# Patient Record
Sex: MALE | Race: WHITE
[De-identification: names, ages, dates, MRNs, and addresses within clinical notes are randomized per-mention and may not be internally consistent; named-entity substitution may affect disease eponyms.]

---

## 2017-06-11 ENCOUNTER — HOSPITAL ENCOUNTER (EMERGENCY)
Dept: HOSPITAL 62 - ER | Age: 82
Discharge: HOME | End: 2017-06-11
Payer: MEDICARE

## 2017-06-11 VITALS — SYSTOLIC BLOOD PRESSURE: 173 MMHG | DIASTOLIC BLOOD PRESSURE: 88 MMHG

## 2017-06-11 DIAGNOSIS — R51: ICD-10-CM

## 2017-06-11 DIAGNOSIS — S09.90XA: Primary | ICD-10-CM

## 2017-06-11 DIAGNOSIS — S00.91XA: ICD-10-CM
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
## 2017-06-11 PROCEDURE — 70450 CT HEAD/BRAIN W/O DYE: CPT

## 2017-06-11 PROCEDURE — 99284 EMERGENCY DEPT VISIT MOD MDM: CPT

## 2017-06-11 NOTE — RADIOLOGY REPORT (SQ)
EXAM DESCRIPTION:  CT HEAD WITHOUT



COMPLETED DATE/TIME:  6/11/2017 11:15 am



REASON FOR STUDY:  fall



COMPARISON:  None.



TECHNIQUE:  Axial images acquired through the brain without intravenous contrast.  Images reviewed wi
th bone, brain and subdural windows.  Images stored on PACS.

All CT scanners at this facility use dose modulation, iterative reconstruction, and/or weight based d
osing when appropriate to reduce radiation dose to as low as reasonably achievable (ALARA).

[facility]: Dose Right  [facility]: CareDose    [facility]: Dose Right    [facility]: Teradose 4D    [facility]: Smart Technologies



RADIATION DOSE:  64.61mGy.



LIMITATIONS:  None.



FINDINGS:  VENTRICLES: Prominent.

CEREBRUM: No masses.  No hemorrhage.  No midline shift.  Areas of low density in the white matter mos
t likely due to chronic micro-vascular ischemic change.  No evidence for acute infarction.

CEREBELLUM: No masses.  No hemorrhage.  No alteration of density.  No evidence for acute infarction.

EXTRAAXIAL SPACES: Age-related involutional change.  No fluid collections.  No masses.

ORBITS AND GLOBE: No intra- or extraconal masses.  Normal contour of globe without masses.

CALVARIUM: No fracture.

PARANASAL SINUSES: No fluid or mucosal thickening.

SOFT TISSUES: Large posterior scalp hematoma.

OTHER: No other significant finding.



IMPRESSION:  SCALP HEMATOMA.  NO FRACTURE OR ACUTE INTRACRANIAL PROCESS IDENTIFIED.



TECHNICAL DOCUMENTATION:  JOB ID:  [id number]

Quality ID # [id number]: Final reports with documentation of one or more dose reduction techniques (e.g., Au
tomated exposure control, adjustment of the mA and/or kV according to patient size, use of iterative 
reconstruction technique)

 2011 [employer]- All Rights Reserved

## 2017-06-11 NOTE — ER DOCUMENT REPORT
ED General





- General


Chief Complaint: Fall


Stated Complaint: FELL/HEAD PAIN


Time Seen by Provider: 06/11/17 10:59


TRAVEL OUTSIDE OF THE [place] IN LAST 30 DAYS: No





- HPI


Patient complains to provider of: fall head trauma


Notes: 


Patient coming in after he fell out of a chair.  Patient fell backwards and 

hitting his head.  Denies any loss of consciousness.  Patient denies any blood 

thinning medications.  Patient is awake alert and oriented at this time 

appropriate according to family members.  Patient does have some bleeding going 

down the back of the head.





- Related Data


Allergies/Adverse Reactions: 


 





No Known Allergies Allergy (Verified 06/11/17 11:01)


 











Past Medical History





- Social History


Smoking Status: Unknown if Ever Smoked


Family History: Reviewed & Not Pertinent





- Past Medical History


Cardiac Medical History: Reports: Hx Atrial Fibrillation, Hx Hypertension


   Denies: Hx Coronary Artery Disease, Hx Heart Attack


Pulmonary Medical History: 


   Denies: Hx Asthma, Hx Bronchitis, Hx COPD, Hx Pneumonia


Neurological Medical History: Denies: Hx Cerebrovascular Accident, Hx Seizures


Renal/ Medical History: Denies: Hx Peritoneal Dialysis


Musculoskeltal Medical History: Denies Hx Arthritis


Psychiatric Medical History: Reports: Hx Depression





- Immunizations


Hx Diphtheria, Pertussis, Tetanus Vaccination: No - unsure 





Review of Systems





- Review of Systems


Constitutional: Other - Trauma to the head


EENT: No symptoms reported


Cardiovascular: No symptoms reported


Respiratory: No symptoms reported


Gastrointestinal: No symptoms reported


Genitourinary: No symptoms reported


Male Genitourinary: No symptoms reported


Musculoskeletal: No symptoms reported


Skin: No symptoms reported


Hematologic/Lymphatic: No symptoms reported


Neurological/Psychological: No symptoms reported





Physical Exam





- Vital signs


Vitals: 


 











Temp Pulse Resp BP Pulse Ox


 


 97.2 F   67   17   173/88 H  98 


 


 06/11/17 10:58  06/11/17 10:58  06/11/17 10:58  06/11/17 10:58  06/11/17 10:58











Interpretation: Normal





- General


General appearance: Appears well, Alert





- HEENT


Head: Normocephalic.  No: Atraumatic - P hematoma to the superior occipital 

region of the head no laceration


Eyes: Normal


Pupils: PERRL





- Respiratory


Respiratory status: No respiratory distress


Chest status: Nontender


Breath sounds: Normal


Chest palpation: Normal





- Cardiovascular


Rhythm: Regular


Heart sounds: Normal auscultation


Murmur: No





- Abdominal


Inspection: Normal


Distension: No distension


Bowel sounds: Normal


Tenderness: Nontender


Organomegaly: No organomegaly





- Back


Back: Normal, Nontender





- Extremities


General upper extremity: Normal inspection, Nontender, Normal color, Normal ROM

, Normal temperature


General lower extremity: Normal inspection, Nontender, Normal color, Normal ROM

, Normal temperature, Normal weight bearing.  No: Ja's sign





- Neurological


Neuro grossly intact: Yes


Cognition: Normal


Orientation: AAOx4


Azeem Coma Scale Eye Opening: Spontaneous


[place] Coma Scale Verbal: Oriented


[place] Coma Scale Motor: Obeys Commands


[place] Coma Scale Total: 15


Speech: Normal


Motor strength normal: LUE, RUE, LLE, RLE


Sensory: Normal





- Psychological


Associated symptoms: Normal affect, Normal mood





- Skin


Skin Temperature: Warm


Skin Moisture: Dry


Skin Color: Normal





Course





- Re-evaluation


Re-evalutation: 





06/11/17 15:29


Patient coming in for head injury abrasion patient was dressed bleeding 

controlled no signs of the laceration repair.  Patient will be discharged home





- Vital Signs


Vital signs: 


 











Temp Pulse Resp BP Pulse Ox


 


 97.2 F   67   17   173/88 H  98 


 


 06/11/17 11:20  06/11/17 11:20  06/11/17 11:20  06/11/17 11:20  06/11/17 11:20














Discharge





- Discharge


Clinical Impression: 


 Abrasions





Head injury


Qualifiers:


 Encounter type: initial encounter Qualified Code(s): S09.90XA - Unspecified 

injury of head, initial encounter





Condition: Good


Disposition: HOME, SELF-CARE


Instructions:  Abrasions ([facility]), Head Injury Precautions ([facility])


Additional Instructions: 


Please follow-up with your doctor as needed.  Return to the ER for any 

concerning symptoms.  Please keep antibiotic ointment on the wound.


Referrals: 


DENICE PANDEY MD [Primary Care Provider] - Follow up as needed

## 2019-01-11 ENCOUNTER — HOSPITAL ENCOUNTER (INPATIENT)
Dept: HOSPITAL 62 - ER | Age: 84
LOS: 5 days | Discharge: NURSING, REHAB OR LONG-TERM CARE | DRG: 481 | End: 2019-01-16
Attending: INTERNAL MEDICINE | Admitting: INTERNAL MEDICINE
Payer: MEDICARE

## 2019-01-11 DIAGNOSIS — B96.4: ICD-10-CM

## 2019-01-11 DIAGNOSIS — Z79.82: ICD-10-CM

## 2019-01-11 DIAGNOSIS — F32.9: ICD-10-CM

## 2019-01-11 DIAGNOSIS — R33.9: ICD-10-CM

## 2019-01-11 DIAGNOSIS — D68.32: ICD-10-CM
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
## 2019-01-11 DIAGNOSIS — S72.142A: Primary | ICD-10-CM

## 2019-01-11 DIAGNOSIS — E87.6: ICD-10-CM

## 2019-01-11 DIAGNOSIS — I48.91: ICD-10-CM

## 2019-01-11 DIAGNOSIS — D62: ICD-10-CM

## 2019-01-11 DIAGNOSIS — E11.9: ICD-10-CM

## 2019-01-11 DIAGNOSIS — N30.00: ICD-10-CM

## 2019-01-11 DIAGNOSIS — I10: ICD-10-CM

## 2019-01-11 LAB
ADD MANUAL DIFF: NO
ALBUMIN SERPL-MCNC: 3.1 G/DL (ref 3.5–5)
ALP SERPL-CCNC: 71 U/L (ref 38–126)
ALT SERPL-CCNC: 24 U/L (ref 21–72)
AMYLASE SERPL-CCNC: < 30 U/L (ref 30–110)
ANION GAP SERPL CALC-SCNC: 7 MMOL/L (ref 5–19)
APPEARANCE UR: (no result)
APTT BLD: 40.9 SEC (ref 23.5–35.8)
APTT BLD: 42.1 SEC (ref 23.5–35.8)
APTT PPP: YELLOW S
AST SERPL-CCNC: 25 U/L (ref 17–59)
BARBITURATES UR QL SCN: NEGATIVE
BASOPHILS # BLD AUTO: 0.1 10^3/UL (ref 0–0.2)
BASOPHILS NFR BLD AUTO: 0.6 % (ref 0–2)
BILIRUB DIRECT SERPL-MCNC: 0.3 MG/DL (ref 0–0.4)
BILIRUB SERPL-MCNC: 0.5 MG/DL (ref 0.2–1.3)
BILIRUB UR QL STRIP: NEGATIVE
BUN SERPL-MCNC: 20 MG/DL (ref 7–20)
CALCIUM: 8.1 MG/DL (ref 8.4–10.2)
CHLORIDE SERPL-SCNC: 109 MMOL/L (ref 98–107)
CK MB SERPL-MCNC: 3.44 NG/ML (ref ?–4.55)
CO2 SERPL-SCNC: 26 MMOL/L (ref 22–30)
EOSINOPHIL # BLD AUTO: 0.1 10^3/UL (ref 0–0.6)
EOSINOPHIL NFR BLD AUTO: 0.8 % (ref 0–6)
ERYTHROCYTE [DISTWIDTH] IN BLOOD BY AUTOMATED COUNT: 13.5 % (ref 11.5–14)
FREE T4 (FREE THYROXINE): 1.17 NG/DL (ref 0.78–2.19)
GLUCOSE SERPL-MCNC: 150 MG/DL (ref 75–110)
GLUCOSE UR STRIP-MCNC: NEGATIVE MG/DL
HCT VFR BLD CALC: 30.2 % (ref 37.9–51)
HGB BLD-MCNC: 10.6 G/DL (ref 13.5–17)
INR PPP: 1.23
INR PPP: 1.29
KETONES UR STRIP-MCNC: NEGATIVE MG/DL
LIPASE SERPL-CCNC: < 10 U/L (ref 23–300)
LYMPHOCYTES # BLD AUTO: 1 10^3/UL (ref 0.5–4.7)
LYMPHOCYTES NFR BLD AUTO: 10.4 % (ref 13–45)
MCH RBC QN AUTO: 32.4 PG (ref 27–33.4)
MCHC RBC AUTO-ENTMCNC: 35 G/DL (ref 32–36)
MCV RBC AUTO: 93 FL (ref 80–97)
METHADONE UR QL SCN: NEGATIVE
MONOCYTES # BLD AUTO: 0.9 10^3/UL (ref 0.1–1.4)
MONOCYTES NFR BLD AUTO: 9 % (ref 3–13)
NEUTROPHILS # BLD AUTO: 8 10^3/UL (ref 1.7–8.2)
NEUTS SEG NFR BLD AUTO: 79.2 % (ref 42–78)
NITRITE UR QL STRIP: POSITIVE
PCP UR QL SCN: NEGATIVE
PH UR STRIP: 6 [PH] (ref 5–9)
PHOSPHATE SERPL-MCNC: 3.4 MG/DL (ref 2.5–4.5)
PLATELET # BLD: 138 10^3/UL (ref 150–450)
POTASSIUM SERPL-SCNC: 3.4 MMOL/L (ref 3.6–5)
PROT SERPL-MCNC: 5.6 G/DL (ref 6.3–8.2)
PROT UR STRIP-MCNC: 100 MG/DL
PROTHROMBIN TIME: 16.1 SEC (ref 11.4–15.4)
PROTHROMBIN TIME: 16.8 SEC (ref 11.4–15.4)
RBC # BLD AUTO: 3.27 10^6/UL (ref 4.35–5.55)
SODIUM SERPL-SCNC: 142.1 MMOL/L (ref 137–145)
SP GR UR STRIP: 1.02
TOTAL CELLS COUNTED % (AUTO): 100 %
TROPONIN I SERPL-MCNC: 0.02 NG/ML
TSH SERPL-ACNC: 0.68 UIU/ML (ref 0.47–4.68)
URINE AMPHETAMINES SCREEN: NEGATIVE
URINE BENZODIAZEPINES SCREEN: NEGATIVE
URINE COCAINE SCREEN: NEGATIVE
URINE MARIJUANA (THC) SCREEN: NEGATIVE
UROBILINOGEN UR-MCNC: NEGATIVE MG/DL (ref ?–2)
WBC # BLD AUTO: 10.1 10^3/UL (ref 4–10.5)

## 2019-01-11 PROCEDURE — 85027 COMPLETE CBC AUTOMATED: CPT

## 2019-01-11 PROCEDURE — 96375 TX/PRO/DX INJ NEW DRUG ADDON: CPT

## 2019-01-11 PROCEDURE — 82570 ASSAY OF URINE CREATININE: CPT

## 2019-01-11 PROCEDURE — 80061 LIPID PANEL: CPT

## 2019-01-11 PROCEDURE — 36415 COLL VENOUS BLD VENIPUNCTURE: CPT

## 2019-01-11 PROCEDURE — 85730 THROMBOPLASTIN TIME PARTIAL: CPT

## 2019-01-11 PROCEDURE — 82150 ASSAY OF AMYLASE: CPT

## 2019-01-11 PROCEDURE — 96374 THER/PROPH/DIAG INJ IV PUSH: CPT

## 2019-01-11 PROCEDURE — 84443 ASSAY THYROID STIM HORMONE: CPT

## 2019-01-11 PROCEDURE — 93010 ELECTROCARDIOGRAM REPORT: CPT

## 2019-01-11 PROCEDURE — 82140 ASSAY OF AMMONIA: CPT

## 2019-01-11 PROCEDURE — 84100 ASSAY OF PHOSPHORUS: CPT

## 2019-01-11 PROCEDURE — 83036 HEMOGLOBIN GLYCOSYLATED A1C: CPT

## 2019-01-11 PROCEDURE — 36430 TRANSFUSION BLD/BLD COMPNT: CPT

## 2019-01-11 PROCEDURE — 83880 ASSAY OF NATRIURETIC PEPTIDE: CPT

## 2019-01-11 PROCEDURE — 80076 HEPATIC FUNCTION PANEL: CPT

## 2019-01-11 PROCEDURE — 86901 BLOOD TYPING SEROLOGIC RH(D): CPT

## 2019-01-11 PROCEDURE — 85610 PROTHROMBIN TIME: CPT

## 2019-01-11 PROCEDURE — 80307 DRUG TEST PRSMV CHEM ANLYZR: CPT

## 2019-01-11 PROCEDURE — 84439 ASSAY OF FREE THYROXINE: CPT

## 2019-01-11 PROCEDURE — 86850 RBC ANTIBODY SCREEN: CPT

## 2019-01-11 PROCEDURE — C1713 ANCHOR/SCREW BN/BN,TIS/BN: HCPCS

## 2019-01-11 PROCEDURE — 99285 EMERGENCY DEPT VISIT HI MDM: CPT

## 2019-01-11 PROCEDURE — 71045 X-RAY EXAM CHEST 1 VIEW: CPT

## 2019-01-11 PROCEDURE — 82553 CREATINE MB FRACTION: CPT

## 2019-01-11 PROCEDURE — 87086 URINE CULTURE/COLONY COUNT: CPT

## 2019-01-11 PROCEDURE — 81001 URINALYSIS AUTO W/SCOPE: CPT

## 2019-01-11 PROCEDURE — 87088 URINE BACTERIA CULTURE: CPT

## 2019-01-11 PROCEDURE — 85230 CLOT FACTOR VII PROCONVERTIN: CPT

## 2019-01-11 PROCEDURE — 84156 ASSAY OF PROTEIN URINE: CPT

## 2019-01-11 PROCEDURE — 86900 BLOOD TYPING SEROLOGIC ABO: CPT

## 2019-01-11 PROCEDURE — C1769 GUIDE WIRE: HCPCS

## 2019-01-11 PROCEDURE — 85240 CLOT FACTOR VIII AHG 1 STAGE: CPT

## 2019-01-11 PROCEDURE — 84484 ASSAY OF TROPONIN QUANT: CPT

## 2019-01-11 PROCEDURE — 87186 SC STD MICRODIL/AGAR DIL: CPT

## 2019-01-11 PROCEDURE — 85220 BLOOC CLOT FACTOR V TEST: CPT

## 2019-01-11 PROCEDURE — 86920 COMPATIBILITY TEST SPIN: CPT

## 2019-01-11 PROCEDURE — P9016 RBC LEUKOCYTES REDUCED: HCPCS

## 2019-01-11 PROCEDURE — 82550 ASSAY OF CK (CPK): CPT

## 2019-01-11 PROCEDURE — 85025 COMPLETE CBC W/AUTO DIFF WBC: CPT

## 2019-01-11 PROCEDURE — 01230 ANES OPN UPPER 2/3 FEMUR NOS: CPT

## 2019-01-11 PROCEDURE — 83735 ASSAY OF MAGNESIUM: CPT

## 2019-01-11 PROCEDURE — 85384 FIBRINOGEN ACTIVITY: CPT

## 2019-01-11 PROCEDURE — 80048 BASIC METABOLIC PNL TOTAL CA: CPT

## 2019-01-11 PROCEDURE — 93005 ELECTROCARDIOGRAM TRACING: CPT

## 2019-01-11 PROCEDURE — 83690 ASSAY OF LIPASE: CPT

## 2019-01-11 RX ADMIN — SIMVASTATIN SCH MG: 10 TABLET, FILM COATED ORAL at 21:54

## 2019-01-11 RX ADMIN — DILTIAZEM HYDROCHLORIDE SCH MG: 240 CAPSULE, EXTENDED RELEASE ORAL at 20:14

## 2019-01-11 RX ADMIN — METOPROLOL SUCCINATE SCH MG: 25 TABLET, EXTENDED RELEASE ORAL at 21:54

## 2019-01-11 RX ADMIN — TAMSULOSIN HYDROCHLORIDE SCH MG: 0.4 CAPSULE ORAL at 19:27

## 2019-01-11 RX ADMIN — MULTIVITAMIN TABLET SCH TAB: TABLET at 19:27

## 2019-01-11 RX ADMIN — ENOXAPARIN SODIUM SCH MG: 40 INJECTION SUBCUTANEOUS at 19:27

## 2019-01-11 RX ADMIN — VENLAFAXINE HYDROCHLORIDE SCH MG: 75 CAPSULE, EXTENDED RELEASE ORAL at 19:26

## 2019-01-11 RX ADMIN — BUPROPION HYDROCHLORIDE SCH MG: 100 TABLET, FILM COATED ORAL at 21:54

## 2019-01-11 NOTE — EKG REPORT
SEVERITY:- ABNORMAL ECG -

SINUS RHYTHM

PROBABLE POSTERIOR INFARCT

NONSPECIFIC T ABNORMALITIES, INFERIOR LEADS

:

Confirmed by: Walter Romo MD 11-Jan-2019 17:42:32

## 2019-01-11 NOTE — RADIOLOGY REPORT (SQ)
EXAM DESCRIPTION:  HIP LEFT AP/LATERAL



COMPLETED DATE/TIME:  1/11/2019 12:24 pm



REASON FOR STUDY:  shortening



COMPARISON:  6/15/2015



NUMBER OF VIEWS:  Two views.



TECHNIQUE:  AP pelvis and additional frog-leg view of the left hip.



LIMITATIONS:  None.



FINDINGS:  MINERALIZATION: Normal.

LEFT HIP: Intertrochanteric fracture.

RIGHT HIP: No fracture or dislocation.  No worrisome bone lesions.

PUBIS AND ISCHIUM: No fracture.

PELVIS: No fracture.

SACRUM: No fracture or dislocation. No worrisome bone lesions.

LOWER LUMBAR SPINE: No fracture or dislocation. No worrisome bone lesions.  No significant disc disea
se.

SOFT TISSUES: No findings.

OTHER: No other significant finding.



IMPRESSION:  Intertrochanteric fracture of the left hip.



TECHNICAL DOCUMENTATION:  JOB ID:  [id number]

 2011 [employer]- All Rights Reserved



Reading location - IP/workstation name: [email or web address]

## 2019-01-11 NOTE — PDOC H&P
History of Present Illness


Admission Date/PCP: 


  01/11/19 14:02





  DENICE PANDEY MD





History of Present Illness: 


MARÍA WSAN is a 87 year old male,He came  to the emergency room for evalu

ation of pain of the left hip joint ,he fell off the chair  about 2 days ago 

there was no antecedent history of chest pain, no loss of consciousness, no 

shortness of breath he was not dizzy, in the emergency room he was evaluated, x-

ray of the left hip joint was obtained it demonstrated intertrochanteric 

fracture of the left hip.  Based on the revised cardiac risk index score, he has

no history of CVA, he has no history of CHF, he has no history of CAD, he has no

history of CKD with a serum creatinine of more than 2 he has no history of 

Diabetes mellitus requiring insulin the prospective procedure is intermediate 

risk for complications based on all these factors he has a very low cardioVascu

lar risk in the perioperative period , Patient can proceed to surgery.








Past Medical History


Cardiac Medical History: Reports: Hypertension


Endocrine Medical History: Reports: Diabetes Mellitus Type 2


Musculoskeltal Medical History: 


   Denies: Arthritis


Psychiatric Medical History: Reports: Depression





Social History


Smoking Status: Never Smoker


Frequency of Alcohol Use: None


Drugs: None





Family History


Family History: Reviewed & Not Pertinent


Parental Family History Reviewed: Yes


Children Family History Reviewed: Yes


Sibling(s) Family History Reviewed.: Yes





Medication/Allergy


Home Medications: 








Aspirin [Ecotrin 81 mg EC Tablet] 81 mg PO DAILY 01/11/19 


Bupropion HCl [Bupropion HCl Sr] 150 mg PO BID 01/11/19 


Diltiazem HCl [Cardizem Cd 240 mg Capsule.cr] 240 mg PO DAILY 01/11/19 


Metoprolol Succinate [Toprol Xl 25 mg Tab.sr] 25 mg PO Q12 01/11/19 


RX: Multivitamin [Daily Multiple Vitamin] 1 tab PO DAILY 01/11/19 


RX: Venlafaxine HCl ER [Effexor Xr 75 mg Cap.sr] 75 mg PO DAILY 01/11/19 


Simvastatin [Zocor 10 mg Tablet] 10 mg PO QHS 01/11/19 


Tamsulosin HCl [Flomax 0.4 mg Cap.sr] 0.4 mg PO PCSUPPER 01/11/19 








Allergies/Adverse Reactions: 


                                        





No Known Allergies Allergy (Verified 06/11/17 11:01)


   











Review of Systems


Constitutional: ABSENT: chills, fever(s), headache(s), weight gain, weight loss


Eyes: ABSENT: visual disturbances


Ears: ABSENT: hearing changes


Cardiovascular: ABSENT: chest pain, dyspnea on exertion, edema, orthropnea, 

palpitations


Respiratory: ABSENT: cough, hemoptysis


Gastrointestinal: ABSENT: abdominal pain, constipation, diarrhea, hematemesis, 

hematochezia, nausea, vomiting


Genitourinary: ABSENT: dysuria, hematuria


Musculoskeletal: PRESENT: other - Left hip joint pain.  ABSENT: joint swelling


Integumentary: ABSENT: rash, wounds


Neurological: ABSENT: abnormal gait, abnormal speech, confusion, dizziness, fo

tamiko weakness, syncope


Psychiatric: ABSENT: anxiety, depression, homidical ideation, suicidal ideation


Endocrine: ABSENT: cold intolerance, heat intolerance, menstrual abnormalities, 

polydipsia, polyuria


Hematologic/Lymphatic: ABSENT: easy bleeding, easy bruising, lymphadenopathy





Physical Exam


Vital Signs: 


                                        











Temp Pulse Resp BP Pulse Ox


 


 98.2 F   59 L  18   167/59 H  94 


 


 01/11/19 17:59  01/11/19 17:59  01/11/19 17:59  01/11/19 17:59  01/11/19 17:59








                                 Intake & Output











 01/10/19 01/11/19 01/12/19





 06:59 06:59 06:59


 


Weight   74.5 kg











General appearance: PRESENT: no acute distress, well-developed, well-nourished


Head exam: PRESENT: atraumatic, normocephalic


Eye exam: PRESENT: conjunctiva pink, EOMI, PERRLA


Ear exam: PRESENT: normal external ear exam


Mouth exam: PRESENT: moist, tongue midline


Neck exam: PRESENT: full ROM


Respiratory exam: PRESENT: clear to auscultation beatrice


Cardiovascular exam: PRESENT: RRR, +S1, +S2


Pulses: PRESENT: normal dorsalis pedis pul, +2 pedal pulses bilateral


Vascular exam: PRESENT: normal capillary refill


GI/Abdominal exam: PRESENT: normal bowel sounds, soft


Rectal exam: PRESENT: deferred


Neurological exam: PRESENT: alert, CN II-XII grossly intact


Psychiatric exam: PRESENT: appropriate affect, normal mood


Skin exam: PRESENT: dry, intact, warm





Results


Laboratory Results: 


                                        





                                 01/11/19 12:27 





                                 01/11/19 12:27 





                                        











  01/11/19 01/11/19 01/11/19





  12:27 12:27 18:42


 


WBC  10.1  


 


RBC  3.27 L  


 


Hgb  10.6 L  


 


Hct  30.2 L  


 


MCV  93  


 


MCH  32.4  


 


MCHC  35.0  


 


RDW  13.5  


 


Plt Count  138 L  


 


Seg Neutrophils %  79.2 H  


 


Lymphocytes %  10.4 L  


 


Monocytes %  9.0  


 


Eosinophils %  0.8  


 


Basophils %  0.6  


 


Absolute Neutrophils  8.0  


 


Absolute Lymphocytes  1.0  


 


Absolute Monocytes  0.9  


 


Absolute Eosinophils  0.1  


 


Absolute Basophils  0.1  


 


Sodium   142.1 


 


Potassium   3.4 L 


 


Chloride   109 H 


 


Carbon Dioxide   26 


 


Anion Gap   7 


 


BUN   20 


 


Creatinine   0.80 


 


Est GFR ( Amer)   > 60 


 


Est GFR (Non-Af Amer)   > 60 


 


Glucose   150 H 


 


Calcium   8.1 L 


 


Phosphorus    3.4


 


Magnesium    1.8


 


Total Bilirubin    0.5


 


AST    25


 


ALT    24


 


Alkaline Phosphatase    71


 


Ammonia   


 


Total Protein    5.6 L


 


Albumin    3.1 L


 


Amylase    < 30 L


 


Lipase    < 10.0 L


 


TSH   


 


Free T4   














  01/11/19 01/11/19





  18:42 18:42


 


WBC  


 


RBC  


 


Hgb  


 


Hct  


 


MCV  


 


MCH  


 


MCHC  


 


RDW  


 


Plt Count  


 


Seg Neutrophils %  


 


Lymphocytes %  


 


Monocytes %  


 


Eosinophils %  


 


Basophils %  


 


Absolute Neutrophils  


 


Absolute Lymphocytes  


 


Absolute Monocytes  


 


Absolute Eosinophils  


 


Absolute Basophils  


 


Sodium  


 


Potassium  


 


Chloride  


 


Carbon Dioxide  


 


Anion Gap  


 


BUN  


 


Creatinine  


 


Est GFR ( Amer)  


 


Est GFR (Non-Af Amer)  


 


Glucose  


 


Calcium  


 


Phosphorus  


 


Magnesium  


 


Total Bilirubin  


 


AST  


 


ALT  


 


Alkaline Phosphatase  


 


Ammonia  < 8.7 L 


 


Total Protein  


 


Albumin  


 


Amylase  


 


Lipase  


 


TSH   0.68


 


Free T4   1.17








                                        











  01/11/19 01/11/19 01/11/19





  18:42 18:42 18:42


 


Creatine Kinase   132 


 


CK-MB (CK-2)    3.44


 


Troponin I    0.020


 


NT-Pro-B Natriuret Pep  1710 H  











Impressions: 


                                        





Hip X-Ray  01/11/19 00:00


IMPRESSION:  Intertrochanteric fracture of the left hip.


 














Assessment & Plan





- Diagnosis


(1) Fracture, intertrochanteric, left femur


Qualifiers: 


   Encounter type: initial encounter   Fracture type: closed   Fracture 

alignment: displaced   Qualified Code(s): S72.142A - Displaced intertrochanteric

fracture of left femur, initial encounter for closed fracture   


Is this a current diagnosis for this admission?: Yes   


Plan: 


Orthopedic consultation obtained








(2) Preprocedural cardiovascular examination


Is this a current diagnosis for this admission?: Yes   


Plan: 


Based on the revised cardiac risk index score, patient can proceed to surgery no

further testing necessary








(3) T2DM (type 2 diabetes mellitus)


Qualifiers: 


   Diabetes mellitus long term insulin use: without long term use   Diabetes 

mellitus complication status: without complication   Qualified Code(s): E11.9 - 

Type 2 diabetes mellitus without complications   


Is this a current diagnosis for this admission?: Yes

## 2019-01-11 NOTE — ER DOCUMENT REPORT
ED Hip Pain/Injury





- General


Chief Complaint: Hip Injury


Stated Complaint: FALL/HIP PAIN


Time Seen by Provider: 01/11/19 11:32


Notes: 





87-year-old male who fell several days ago complains of left hip pain.  The 

patient missed a chair trying to sit down.  Wife states he tends to plop in the 

chair.  He hit the chair then fell off the chair.  Denies hitting his head or 

neck or have any loss of consciousness.  She complains of sharp left hip pain.  

Patient is come to the ER for evaluation.  Denies chest pain denies shortness of

breath denies abdominal pain denies nausea vomiting.  Patient did not pass out 

before or after the fall.  The pain is severe again worse with movement


TRAVEL OUTSIDE OF THE [place] IN LAST 30 DAYS: No





- Related Data


Allergies/Adverse Reactions: 


                                        





No Known Allergies Allergy (Verified 06/11/17 11:01)


   











Past Medical History





- Social History


Smoking Status: Unknown if Ever Smoked


Family History: Reviewed & Not Pertinent





- Past Medical History


Cardiac Medical History: Reports: Hx Atrial Fibrillation, Hx Hypertension


   Denies: Hx Coronary Artery Disease, Hx Heart Attack


Pulmonary Medical History: 


   Denies: Hx Asthma, Hx Bronchitis, Hx COPD, Hx Pneumonia


Neurological Medical History: Denies: Hx Cerebrovascular Accident, Hx Seizures


Renal/ Medical History: Denies: Hx Peritoneal Dialysis


Musculoskeletal Medical History: Denies Hx Arthritis


Psychiatric Medical History: Reports: Hx Depression





- Immunizations


Hx Diphtheria, Pertussis, Tetanus Vaccination: No - unsure 





Review of Systems





- Review of Systems


Constitutional: denies: Chills, Fever


Cardiovascular: denies: Chest pain, Dyspnea


Respiratory: denies: Short of breath


Musculoskeletal: Joint pain.  denies: Back pain


Neurological/Psychological: denies: Lost consciousness, Headaches


-: Yes All other systems reviewed and negative





Physical Exam





- Vital signs


Vitals: 


                                        











Temp Pulse Resp BP Pulse Ox


 


 98.9 F   65   16   161/79 H  98 


 


 01/11/19 11:35  01/11/19 11:35  01/11/19 11:35  01/11/19 11:35  01/11/19 11:35














- Notes


Notes: 





GENERAL_APPEARANCE: well_nourished, alert, cooperative, appears very 

uncomfortable


 VITALS: reviewed, see vital signs table.


 HEAD: no_swelling\tenderness on the head.


 EYES: PERRL, EOMI, conjunctiva_clear.


 NOSE: no_nasal_discharge.


 MOUTH: (-)decreased moisture.


 THROAT: no_throat_inflammation, no_airway_obstruction. no_lymphadenopathy


 NECK: supple, no_neck_tenderness, (-)thyromegaly.


 BACK: no_back_tenderness.


 CHEST_WALL: no_chest_tenderness.


 LUNGS: no_wheezing, no_rales, no_rhonchi, (-)accessory muscle use, good air 

exchange bilateral.


 HEART: normal_rate, normal_rhythm, normal_S1, normal_S2, (-)S3, (-)S4, 

no_murmur, no_rub.


 ABDOMEN: normal_BS, soft, no_abd_tenderness, (-)guarding, (-)rebound, 

no_organomegaly, no_abd_masses.


 EXTREMITIES: Left hip pain at the greater trochanter with rotation shortening 

noted PMS intact distal


 SKIN: warm, dry, good_color, no_rash.


 MENTAL_STATUS: speech_clear, oriented_X_3, normal_affect, responds_appropria

tely to questions.


 NEURO: Neg Motor or Sensory Deficits on exam, CN 2-12 intact, DTR 2+ symmetric 

x 4, No cerbellar signs

















Course





- Re-evaluation


Re-evalutation: 





01/11/19 12:07


Patient has had a delayed presentation from a fall.  He had no head or neck 

injury.  He has left hip pain has obvious rotation or shortening we will get an 

x-ray to confirm fracture.  We will draw preop labs.  Pain control.





01/11/19 13:46


The patient does have a left intertrochanteric hip fracture.  I spoke with 

orthopedics on call Dr. Escobar.  He recommends medicine to admit.





I spoke with Dr. Nagel.  He will do the medical admit for the hospital.  

Spoke with the patient.  The patient's coagulopathy studies are off.  He is not 

on any anticoagulants just on aspirin.  We will have to recheck that before any 

surgery is performed.





- Vital Signs


Vital signs: 


                                        











Temp Pulse Resp BP Pulse Ox


 


 98.9 F   65   16   157/67 H  96 


 


 01/11/19 11:35  01/11/19 11:35  01/11/19 12:27  01/11/19 12:27  01/11/19 12:27














- Laboratory


Result Diagrams: 


                                 01/11/19 12:27





                                 01/11/19 12:27


Laboratory results interpreted by me: 


                                        











  01/11/19 01/11/19 01/11/19





  12:27 12:27 12:27


 


RBC  3.27 L  


 


Hgb  10.6 L  


 


Hct  30.2 L  


 


Plt Count  138 L  


 


Seg Neutrophils %  79.2 H  


 


Lymphocytes %  10.4 L  


 


PT   16.8 H 


 


APTT   42.1 H 


 


Potassium    3.4 L


 


Chloride    109 H


 


Glucose    150 H


 


Calcium    8.1 L














Discharge





- Discharge


Clinical Impression: 


Closed left hip fracture


Qualifiers:


 Encounter type: initial encounter Qualified Code(s): S72.002A - Fracture of 

unspecified part of neck of left femur, initial encounter for closed fracture





Condition: Good


Disposition: ADMITTED AS INPATIENT


Admitting Provider: Shona


Unit Admitted: Surgical Floor


Referrals: 


DENICE NAGEL MD [Primary Care Provider] - Follow up as needed

## 2019-01-11 NOTE — RADIOLOGY REPORT (SQ)
EXAM DESCRIPTION: 



XR CHEST 1 VIEW



COMPLETED DATE/TME:  01/11/2019 00:00



CLINICAL HISTORY: 



elevated bnp 



COMPARISON: 



September 19, 2013



FINDINGS: 



Contour of the mediastinum is unchanged compared with the prior

examination. Patient is rotated. Cardiac silhouette is mildly

enlarged, unchanged compared with the prior exam. EKG leads

project over the chest. There is no focal parenchymal or pleural

disease. There is no acute osseous process visualized.



IMPRESSION: 



No evidence of acute cardiopulmonary disease.

## 2019-01-11 NOTE — PDOC CONSULTATION
History of Present Illness


Admission Date/PCP: 


  01/11/19 14:02





  DENICE PANDEY MD





Patient complains of: Left hip pain


History of Present Illness: 


MARÍA SWAN is a 87 year old male who sustained a fall on 1/8/19.  Patient was

seen by EMS and had attempted to ambulate on his lower extremity but he 

continued to have difficulty and pain.  Thus was brought by EMS to the emergency

room.  While in the emergency room x-rays demonstrated fracture.  States pain 

has been noticeable with attempted ambulation.  Denies headache dizziness or 

trauma.  Denies chest pain or shortness of breath.  Denies numbness or tingling.

 Pain 4/10.








Past Medical History


Cardiac Medical History: Reports: Atrial Fibrillation, Hypertension


   Denies: Coronary Artery Disease, Myocardial Infarction


Pulmonary Medical History: 


   Denies: Asthma, Bronchitis, Chronic Obstructive Pulmonary Disease (COPD), 

Pneumonia


Neurological Medical History: 


   Denies: Seizures


Musculoskeltal Medical History: 


   Denies: Arthritis


Psychiatric Medical History: Reports: Depression


Hematology: 


   Denies: Anemia





Social History


Smoking Status: Unknown if Ever Smoked





Family History


Family History: Reviewed & Not Pertinent


Parental Family History Reviewed: No


Children Family History Reviewed: No


Sibling(s) Family History Reviewed.: No





Medication/Allergy


Home Medications: 








Aspirin [Ecotrin 81 mg EC Tablet] 81 mg PO DAILY 01/11/19 


Bupropion HCl [Bupropion HCl Sr] 150 mg PO BID 01/11/19 


Diltiazem HCl [Cardizem Cd 240 mg Capsule.cr] 240 mg PO DAILY 01/11/19 


Metoprolol Succinate [Toprol Xl 25 mg Tab.sr] 25 mg PO Q12 01/11/19 


Multivitamin [Daily Multiple Vitamin] 1 tab PO DAILY 01/11/19 


Simvastatin [Zocor 10 mg Tablet] 10 mg PO QHS 01/11/19 


Tamsulosin HCl [Flomax 0.4 mg Cap.sr] 0.4 mg PO PCSUPPER 01/11/19 


Venlafaxine HCl ER [Effexor Xr 75 mg Cap.sr] 75 mg PO DAILY 01/11/19 








Allergies/Adverse Reactions: 


                                        





No Known Allergies Allergy (Verified 06/11/17 11:01)


   











Review of Systems


Constitutional: ABSENT: chills, fever(s), headache(s), weight gain, weight loss


Eyes: ABSENT: visual disturbances


Ears: ABSENT: hearing changes


Cardiovascular: ABSENT: chest pain, dyspnea on exertion, edema, orthropnea, 

palpitations


Respiratory: ABSENT: cough, hemoptysis


Gastrointestinal: ABSENT: abdominal pain, constipation, diarrhea, hematemesis, 

hematochezia, nausea, vomiting


Genitourinary: ABSENT: dysuria, hematuria


Integumentary: ABSENT: rash, wounds


Neurological: ABSENT: abnormal gait, abnormal speech, confusion, dizziness, 

focal weakness, syncope


Psychiatric: ABSENT: anxiety, depression, homidical ideation, suicidal ideation


Endocrine: ABSENT: cold intolerance, heat intolerance, menstrual abnormalities, 

polydipsia, polyuria


Hematologic/Lymphatic: ABSENT: easy bleeding, easy bruising, lymphadenopathy





Physical Exam


Vital Signs: 


                                        











Temp Pulse Resp BP Pulse Ox


 


 98.9 F   65   18   159/68 H  98 


 


 01/11/19 11:35  01/11/19 11:35  01/11/19 14:01  01/11/19 14:01  01/11/19 14:01








                                 Intake & Output











 01/10/19 01/11/19 01/12/19





 06:59 06:59 06:59


 


Weight   72.575 kg











General appearance: PRESENT: no acute distress, well-developed, well-nourished


Head exam: PRESENT: atraumatic, normocephalic


Eye exam: PRESENT: conjunctiva pink, EOMI, PERRLA.  ABSENT: scleral icterus


Ear exam: PRESENT: normal external ear exam


Mouth exam: PRESENT: moist, tongue midline


Neck exam: PRESENT: full ROM.  ABSENT: carotid bruit, JVD, lymphadenopathy, 

thyromegaly


Respiratory exam: PRESENT: unlabored


Cardiovascular exam: PRESENT: RRR.  ABSENT: diastolic murmur, rubs, systolic 

murmur


Pulses: PRESENT: normal dorsalis pedis pul, +2 pedal pulses bilateral


Vascular exam: PRESENT: normal capillary refill


GI/Abdominal exam: PRESENT: normal bowel sounds, soft.  ABSENT: distended, 

guarding, mass, organolmegaly, rebound, tenderness


Rectal exam: PRESENT: deferred


Musculoskeletal exam: PRESENT: other - Left hip: Positive logroll, tenderness 

along the trochanteric region.  Moderate thigh swelling without change, intact 

plantarflexion/dorsiflexion.  No sensory deficits.  No calf tenderness.


Neurological exam: PRESENT: alert, awake, oriented to person, oriented to place,

oriented to time, oriented to situation, CN II-XII grossly intact.  ABSENT: 

motor sensory deficit


Psychiatric exam: PRESENT: appropriate affect, normal mood.  ABSENT: homicidal 

ideation, suicidal ideation


Skin exam: PRESENT: dry, intact, warm.  ABSENT: cyanosis, rash





Results


Laboratory Results: 


                                        





                                 01/11/19 12:27 





                                 01/11/19 12:27 





                                        











  01/11/19 01/11/19





  12:27 12:27


 


WBC  10.1 


 


RBC  3.27 L 


 


Hgb  10.6 L 


 


Hct  30.2 L 


 


MCV  93 


 


MCH  32.4 


 


MCHC  35.0 


 


RDW  13.5 


 


Plt Count  138 L 


 


Seg Neutrophils %  79.2 H 


 


Lymphocytes %  10.4 L 


 


Monocytes %  9.0 


 


Eosinophils %  0.8 


 


Basophils %  0.6 


 


Absolute Neutrophils  8.0 


 


Absolute Lymphocytes  1.0 


 


Absolute Monocytes  0.9 


 


Absolute Eosinophils  0.1 


 


Absolute Basophils  0.1 


 


Sodium   142.1


 


Potassium   3.4 L


 


Chloride   109 H


 


Carbon Dioxide   26


 


Anion Gap   7


 


BUN   20


 


Creatinine   0.80


 


Est GFR ( Amer)   > 60


 


Est GFR (Non-Af Amer)   > 60


 


Glucose   150 H


 


Calcium   8.1 L











Impressions: 


                                        





Hip X-Ray  01/11/19 00:00


IMPRESSION:  Intertrochanteric fracture of the left hip.


 














Assessment & Plan





- Diagnosis


(1) Fracture, intertrochanteric, left femur


Qualifiers: 


   Encounter type: initial encounter   Fracture type: closed   Fracture 

alignment: displaced   Qualified Code(s): S72.142A - Displaced intertrochanteric

fracture of left femur, initial encounter for closed fracture   


Is this a current diagnosis for this admission?: Yes   


Plan: 


Patient sustained a intertrochanteric fracture.  Today we discussed findings and

treatment options including operative versus nonoperative intervention patient 

is a community ambulator and thus decision was made to proceed with operative 

treatment.  Patient and family understand he is at increased risk of anesthesia 

risks given his history of atrial fibrillation but despite this fact I feel 

operative intervention is warranted in patient's situation.  After discussing 

the options with the patient and wife joint decision was made to proceed with 

operative treatment which includes left cephalo-medullary nail intertrochanteric

fracture.  Risks discussed include anesthetic complications, excessive bleeding,

infection, injury to surrounding nerves, vessels and tendons, bruising, healing 

difficulties, scar formation, posttraumatic arthritis and any unforseen 

complication.

## 2019-01-12 LAB
ADD MANUAL DIFF: NO
ANION GAP SERPL CALC-SCNC: 5 MMOL/L (ref 5–19)
APTT BLD: 43.7 SEC (ref 23.5–35.8)
BASOPHILS # BLD AUTO: 0.1 10^3/UL (ref 0–0.2)
BASOPHILS NFR BLD AUTO: 0.7 % (ref 0–2)
BUN SERPL-MCNC: 20 MG/DL (ref 7–20)
CALCIUM: 7.6 MG/DL (ref 8.4–10.2)
CHLORIDE SERPL-SCNC: 113 MMOL/L (ref 98–107)
CHOLEST SERPL-MCNC: 113.8 MG/DL (ref 0–200)
CK MB SERPL-MCNC: 2.36 NG/ML (ref ?–4.55)
CK MB SERPL-MCNC: 2.95 NG/ML (ref ?–4.55)
CK SERPL-CCNC: 83 U/L (ref 55–170)
CO2 SERPL-SCNC: 26 MMOL/L (ref 22–30)
CREAT UR-MCNC: 170.4 MG/DL (ref 22–328)
EOSINOPHIL # BLD AUTO: 0.2 10^3/UL (ref 0–0.6)
EOSINOPHIL NFR BLD AUTO: 2.8 % (ref 0–6)
ERYTHROCYTE [DISTWIDTH] IN BLOOD BY AUTOMATED COUNT: 13.5 % (ref 11.5–14)
GLUCOSE SERPL-MCNC: 114 MG/DL (ref 75–110)
HCT VFR BLD CALC: 27.7 % (ref 37.9–51)
HGB BLD-MCNC: 9.4 G/DL (ref 13.5–17)
INR PPP: 1.21
LDLC SERPL DIRECT ASSAY-MCNC: 60 MG/DL (ref ?–100)
LYMPHOCYTES # BLD AUTO: 1.2 10^3/UL (ref 0.5–4.7)
LYMPHOCYTES NFR BLD AUTO: 15.6 % (ref 13–45)
MCH RBC QN AUTO: 31.6 PG (ref 27–33.4)
MCHC RBC AUTO-ENTMCNC: 34.1 G/DL (ref 32–36)
MCV RBC AUTO: 93 FL (ref 80–97)
MONOCYTES # BLD AUTO: 0.7 10^3/UL (ref 0.1–1.4)
MONOCYTES NFR BLD AUTO: 8.8 % (ref 3–13)
NEUTROPHILS # BLD AUTO: 5.7 10^3/UL (ref 1.7–8.2)
NEUTS SEG NFR BLD AUTO: 72.1 % (ref 42–78)
PLATELET # BLD: 144 10^3/UL (ref 150–450)
POTASSIUM SERPL-SCNC: 3.7 MMOL/L (ref 3.6–5)
PROT UR STRIP-MCNC: 104.7 MG/DL (ref ?–12)
PROTHROMBIN TIME: 16 SEC (ref 11.4–15.4)
RBC # BLD AUTO: 2.99 10^6/UL (ref 4.35–5.55)
SODIUM SERPL-SCNC: 143.7 MMOL/L (ref 137–145)
TOTAL CELLS COUNTED % (AUTO): 100 %
TRIGL SERPL-MCNC: 105 MG/DL (ref ?–150)
TROPONIN I SERPL-MCNC: 0.01 NG/ML
TROPONIN I SERPL-MCNC: < 0.012 NG/ML
UR PRO/CREAT RATIO RESULT: 0.6 MG/MG (ref 0–0.2)
VLDLC SERPL CALC-MCNC: 21 MG/DL (ref 10–31)
WBC # BLD AUTO: 7.9 10^3/UL (ref 4–10.5)

## 2019-01-12 RX ADMIN — ENOXAPARIN SODIUM SCH: 40 INJECTION SUBCUTANEOUS at 13:59

## 2019-01-12 RX ADMIN — METOPROLOL SUCCINATE SCH MG: 25 TABLET, EXTENDED RELEASE ORAL at 21:48

## 2019-01-12 RX ADMIN — CIPROFLOXACIN SCH MLS/HR: 2 INJECTION, SOLUTION INTRAVENOUS at 11:46

## 2019-01-12 RX ADMIN — CIPROFLOXACIN SCH MLS/HR: 2 INJECTION, SOLUTION INTRAVENOUS at 21:49

## 2019-01-12 RX ADMIN — BUPROPION HYDROCHLORIDE SCH MG: 100 TABLET, FILM COATED ORAL at 13:57

## 2019-01-12 RX ADMIN — TAMSULOSIN HYDROCHLORIDE SCH MG: 0.4 CAPSULE ORAL at 17:48

## 2019-01-12 RX ADMIN — BUPROPION HYDROCHLORIDE SCH MG: 100 TABLET, FILM COATED ORAL at 05:05

## 2019-01-12 RX ADMIN — BUPROPION HYDROCHLORIDE SCH MG: 100 TABLET, FILM COATED ORAL at 21:48

## 2019-01-12 RX ADMIN — SIMVASTATIN SCH MG: 10 TABLET, FILM COATED ORAL at 21:48

## 2019-01-12 RX ADMIN — MULTIVITAMIN TABLET SCH TAB: TABLET at 13:58

## 2019-01-12 RX ADMIN — METOPROLOL SUCCINATE SCH MG: 25 TABLET, EXTENDED RELEASE ORAL at 13:57

## 2019-01-12 RX ADMIN — VENLAFAXINE HYDROCHLORIDE SCH MG: 75 CAPSULE, EXTENDED RELEASE ORAL at 13:57

## 2019-01-12 RX ADMIN — DILTIAZEM HYDROCHLORIDE SCH: 240 CAPSULE, EXTENDED RELEASE ORAL at 14:01

## 2019-01-12 RX ADMIN — SODIUM CHLORIDE PRN MLS/HR: 9 INJECTION, SOLUTION INTRAVENOUS at 05:06

## 2019-01-12 NOTE — PDOC PROGRESS REPORT
Subjective


Progress Note for:: 01/12/19


Subjective:: 





Patient was admitted yesterday for the management of fracture of the left femur,

he was seen by orthopedic, scheduled for surgery tomorrow.  The urinalysis was 

grossly abnormal, the urine dipstick was positive for protein, blood, nitrites, 

leukocyte esterase, the microscopy demonstrated bacteriuria, red blood cells, 

white blood cells in the urine, patient was started on Cipro empirically for 

presumed UTI


Reason For Visit: 


CLOSED FRACTURE LEFT HIP








Physical Exam


Vital Signs: 


                                        











Temp Pulse Resp BP Pulse Ox


 


 98.3 F   48 L  22 H  116/47 L  98 


 


 01/12/19 11:44  01/12/19 11:44  01/12/19 11:44  01/12/19 11:44  01/12/19 11:44








                                 Intake & Output











 01/11/19 01/12/19 01/13/19





 06:59 06:59 06:59


 


Intake Total  160 200


 


Output Total  380 


 


Balance  -220 200


 


Weight  75.1 kg 











General appearance: PRESENT: no acute distress


Eye exam: PRESENT: PERRLA


Respiratory exam: PRESENT: clear to auscultation beatrice


Cardiovascular exam: PRESENT: +S1, +S2


GI/Abdominal exam: PRESENT: soft


Neurological exam: PRESENT: alert, CN II-XII grossly intact





Results


Laboratory Results: 


                                        





                                 01/12/19 06:20 





                                 01/12/19 06:20 





                                        











  01/11/19 01/11/19 01/11/19





  18:42 18:42 18:42


 


WBC   


 


RBC   


 


Hgb   


 


Hct   


 


MCV   


 


MCH   


 


MCHC   


 


RDW   


 


Plt Count   


 


Seg Neutrophils %   


 


Lymphocytes %   


 


Monocytes %   


 


Eosinophils %   


 


Basophils %   


 


Absolute Neutrophils   


 


Absolute Lymphocytes   


 


Absolute Monocytes   


 


Absolute Eosinophils   


 


Absolute Basophils   


 


Sodium   


 


Potassium   


 


Chloride   


 


Carbon Dioxide   


 


Anion Gap   


 


BUN   


 


Creatinine   


 


Est GFR ( Amer)   


 


Est GFR (Non-Af Amer)   


 


Glucose   


 


Calcium   


 


Phosphorus  3.4  


 


Magnesium  1.8  


 


Total Bilirubin  0.5  


 


AST  25  


 


ALT  24  


 


Alkaline Phosphatase  71  


 


Ammonia   < 8.7 L 


 


Total Protein  5.6 L  


 


Albumin  3.1 L  


 


Triglycerides   


 


Cholesterol   


 


LDL Cholesterol Direct   


 


VLDL Cholesterol   


 


HDL Cholesterol   


 


Amylase  < 30 L  


 


Lipase  < 10.0 L  


 


TSH    0.68


 


Free T4    1.17


 


Urine Color   


 


Urine Appearance   


 


Urine pH   


 


Ur Specific Gravity   


 


Urine Protein   


 


Urine Glucose (UA)   


 


Urine Ketones   


 


Urine Blood   


 


Urine Nitrite   


 


Ur Leukocyte Esterase   


 


Urine WBC (Auto)   


 


Urine RBC (Auto)   














  01/11/19 01/12/19 01/12/19





  22:58 06:20 06:20


 


WBC   7.9 


 


RBC   2.99 L 


 


Hgb   9.4 L 


 


Hct   27.7 L 


 


MCV   93 


 


MCH   31.6 


 


MCHC   34.1 


 


RDW   13.5 


 


Plt Count   144 L 


 


Seg Neutrophils %   72.1 


 


Lymphocytes %   15.6 


 


Monocytes %   8.8 


 


Eosinophils %   2.8 


 


Basophils %   0.7 


 


Absolute Neutrophils   5.7 


 


Absolute Lymphocytes   1.2 


 


Absolute Monocytes   0.7 


 


Absolute Eosinophils   0.2 


 


Absolute Basophils   0.1 


 


Sodium    143.7


 


Potassium    3.7


 


Chloride    113 H


 


Carbon Dioxide    26


 


Anion Gap    5


 


BUN    20


 


Creatinine    0.87


 


Est GFR ( Amer)    > 60


 


Est GFR (Non-Af Amer)    > 60


 


Glucose    114 H


 


Calcium    7.6 L


 


Phosphorus   


 


Magnesium   


 


Total Bilirubin   


 


AST   


 


ALT   


 


Alkaline Phosphatase   


 


Ammonia   


 


Total Protein   


 


Albumin   


 


Triglycerides    105


 


Cholesterol    113.80


 


LDL Cholesterol Direct    60


 


VLDL Cholesterol    21.0


 


HDL Cholesterol    38 L


 


Amylase   


 


Lipase   


 


TSH   


 


Free T4   


 


Urine Color  YELLOW  


 


Urine Appearance  CLOUDY  


 


Urine pH  6.0  


 


Ur Specific Gravity  1.024  


 


Urine Protein  100 H  


 


Urine Glucose (UA)  NEGATIVE  


 


Urine Ketones  NEGATIVE  


 


Urine Blood  NEGATIVE  


 


Urine Nitrite  POSITIVE H  


 


Ur Leukocyte Esterase  LARGE H  


 


Urine WBC (Auto)  >182  


 


Urine RBC (Auto)  24  








                                        











  01/11/19 01/11/19 01/11/19





  18:42 18:42 18:42


 


Creatine Kinase   132 


 


CK-MB (CK-2)    3.44


 


Troponin I    0.020


 


NT-Pro-B Natriuret Pep  1710 H  














  01/12/19 01/12/19 01/12/19





  00:48 00:48 06:20


 


Creatine Kinase  102   83


 


CK-MB (CK-2)   2.95 


 


Troponin I   0.015 


 


NT-Pro-B Natriuret Pep   














  01/12/19





  06:20


 


Creatine Kinase 


 


CK-MB (CK-2)  2.36


 


Troponin I  < 0.012


 


NT-Pro-B Natriuret Pep 











Impressions: 


                                        





Chest X-Ray  01/11/19 00:00


IMPRESSION: 


 


No evidence of acute cardiopulmonary disease.


 


 


 








Hip X-Ray  01/11/19 00:00


IMPRESSION:  Intertrochanteric fracture of the left hip.


 














Assessment & Plan





- Diagnosis


(1) Fracture, intertrochanteric, left femur


Qualifiers: 


   Encounter type: initial encounter   Fracture type: closed   Fracture 

alignment: displaced   Qualified Code(s): S72.142A - Displaced intertrochanteric

fracture of left femur, initial encounter for closed fracture   


Is this a current diagnosis for this admission?: Yes   


Plan: 


Per orthopedics








(2) Preprocedural cardiovascular examination


Is this a current diagnosis for this admission?: Yes   


Plan: 


Based on the revised cardiac risk index score, patient can proceed to surgery no

further testing necessary








(3) T2DM (type 2 diabetes mellitus)


Qualifiers: 


   Diabetes mellitus long term insulin use: without long term use   Diabetes 

mellitus complication status: without complication   Qualified Code(s): E11.9 - 

Type 2 diabetes mellitus without complications   


Is this a current diagnosis for this admission?: Yes   





(4) Urinary tract infection


Qualifiers: 


   Urinary tract infection type: acute cystitis   Hematuria presence: without 

hematuria   Qualified Code(s): N30.00 - Acute cystitis without hematuria   


Is this a current diagnosis for this admission?: Yes   


Plan: 


Start  Cipro

## 2019-01-12 NOTE — PDOC PROGRESS REPORT
Subjective


Progress Note for:: 01/12/19


Subjective:: 





Patient lying in bed comfortably.  Pain currently tolerable.  No issues 

overnight.  Denies chest pain or shortness of breath.


Reason For Visit: 


CLOSED FRACTURE LEFT HIP








Physical Exam


Vital Signs: 


                                        











Temp Pulse Resp BP Pulse Ox


 


 98.6 F   71   19   129/63 H  95 


 


 01/11/19 20:07  01/11/19 20:07  01/11/19 20:07  01/11/19 20:07  01/11/19 20:07








                                 Intake & Output











 01/11/19 01/12/19 01/13/19





 06:59 06:59 06:59


 


Intake Total  160 


 


Output Total  380 


 


Balance  -220 


 


Weight  75.1 kg 











Musculoskeletal exam: PRESENT: other - Left lower extremity: Shortened/external 

rotated positive logroll.  Intact plantar flexion/dorsiflexion.  No sensory 

deficits.  No calf tenderness.





Results


Laboratory Results: 


                                        





                                 01/12/19 06:20 





                                 01/12/19 06:20 





                                        











  01/11/19 01/11/19 01/11/19





  12:27 12:27 18:42


 


WBC  10.1  


 


RBC  3.27 L  


 


Hgb  10.6 L  


 


Hct  30.2 L  


 


MCV  93  


 


MCH  32.4  


 


MCHC  35.0  


 


RDW  13.5  


 


Plt Count  138 L  


 


Seg Neutrophils %  79.2 H  


 


Lymphocytes %  10.4 L  


 


Monocytes %  9.0  


 


Eosinophils %  0.8  


 


Basophils %  0.6  


 


Absolute Neutrophils  8.0  


 


Absolute Lymphocytes  1.0  


 


Absolute Monocytes  0.9  


 


Absolute Eosinophils  0.1  


 


Absolute Basophils  0.1  


 


Sodium   142.1 


 


Potassium   3.4 L 


 


Chloride   109 H 


 


Carbon Dioxide   26 


 


Anion Gap   7 


 


BUN   20 


 


Creatinine   0.80 


 


Est GFR ( Amer)   > 60 


 


Est GFR (Non-Af Amer)   > 60 


 


Glucose   150 H 


 


Calcium   8.1 L 


 


Phosphorus    3.4


 


Magnesium    1.8


 


Total Bilirubin    0.5


 


AST    25


 


ALT    24


 


Alkaline Phosphatase    71


 


Ammonia   


 


Total Protein    5.6 L


 


Albumin    3.1 L


 


Triglycerides   


 


Cholesterol   


 


LDL Cholesterol Direct   


 


VLDL Cholesterol   


 


HDL Cholesterol   


 


Amylase    < 30 L


 


Lipase    < 10.0 L


 


TSH   


 


Free T4   


 


Urine Color   


 


Urine Appearance   


 


Urine pH   


 


Ur Specific Gravity   


 


Urine Protein   


 


Urine Glucose (UA)   


 


Urine Ketones   


 


Urine Blood   


 


Urine Nitrite   


 


Ur Leukocyte Esterase   


 


Urine WBC (Auto)   


 


Urine RBC (Auto)   














  01/11/19 01/11/19 01/11/19





  18:42 18:42 22:58


 


WBC   


 


RBC   


 


Hgb   


 


Hct   


 


MCV   


 


MCH   


 


MCHC   


 


RDW   


 


Plt Count   


 


Seg Neutrophils %   


 


Lymphocytes %   


 


Monocytes %   


 


Eosinophils %   


 


Basophils %   


 


Absolute Neutrophils   


 


Absolute Lymphocytes   


 


Absolute Monocytes   


 


Absolute Eosinophils   


 


Absolute Basophils   


 


Sodium   


 


Potassium   


 


Chloride   


 


Carbon Dioxide   


 


Anion Gap   


 


BUN   


 


Creatinine   


 


Est GFR ( Amer)   


 


Est GFR (Non-Af Amer)   


 


Glucose   


 


Calcium   


 


Phosphorus   


 


Magnesium   


 


Total Bilirubin   


 


AST   


 


ALT   


 


Alkaline Phosphatase   


 


Ammonia  < 8.7 L  


 


Total Protein   


 


Albumin   


 


Triglycerides   


 


Cholesterol   


 


LDL Cholesterol Direct   


 


VLDL Cholesterol   


 


HDL Cholesterol   


 


Amylase   


 


Lipase   


 


TSH   0.68 


 


Free T4   1.17 


 


Urine Color    YELLOW


 


Urine Appearance    CLOUDY


 


Urine pH    6.0


 


Ur Specific Gravity    1.024


 


Urine Protein    100 H


 


Urine Glucose (UA)    NEGATIVE


 


Urine Ketones    NEGATIVE


 


Urine Blood    NEGATIVE


 


Urine Nitrite    POSITIVE H


 


Ur Leukocyte Esterase    LARGE H


 


Urine WBC (Auto)    >182


 


Urine RBC (Auto)    24














  01/12/19 01/12/19





  06:20 06:20


 


WBC  7.9 


 


RBC  2.99 L 


 


Hgb  9.4 L 


 


Hct  27.7 L 


 


MCV  93 


 


MCH  31.6 


 


MCHC  34.1 


 


RDW  13.5 


 


Plt Count  144 L 


 


Seg Neutrophils %  72.1 


 


Lymphocytes %  15.6 


 


Monocytes %  8.8 


 


Eosinophils %  2.8 


 


Basophils %  0.7 


 


Absolute Neutrophils  5.7 


 


Absolute Lymphocytes  1.2 


 


Absolute Monocytes  0.7 


 


Absolute Eosinophils  0.2 


 


Absolute Basophils  0.1 


 


Sodium   143.7


 


Potassium   3.7


 


Chloride   113 H


 


Carbon Dioxide   26


 


Anion Gap   5


 


BUN   20


 


Creatinine   0.87


 


Est GFR ( Amer)   > 60


 


Est GFR (Non-Af Amer)   > 60


 


Glucose   114 H


 


Calcium   7.6 L


 


Phosphorus  


 


Magnesium  


 


Total Bilirubin  


 


AST  


 


ALT  


 


Alkaline Phosphatase  


 


Ammonia  


 


Total Protein  


 


Albumin  


 


Triglycerides   105


 


Cholesterol   113.80


 


LDL Cholesterol Direct   60


 


VLDL Cholesterol   21.0


 


HDL Cholesterol   38 L


 


Amylase  


 


Lipase  


 


TSH  


 


Free T4  


 


Urine Color  


 


Urine Appearance  


 


Urine pH  


 


Ur Specific Gravity  


 


Urine Protein  


 


Urine Glucose (UA)  


 


Urine Ketones  


 


Urine Blood  


 


Urine Nitrite  


 


Ur Leukocyte Esterase  


 


Urine WBC (Auto)  


 


Urine RBC (Auto)  








                                        











  01/11/19 01/11/19 01/11/19





  18:42 18:42 18:42


 


Creatine Kinase   132 


 


CK-MB (CK-2)    3.44


 


Troponin I    0.020


 


NT-Pro-B Natriuret Pep  1710 H  














  01/12/19 01/12/19 01/12/19





  00:48 00:48 06:20


 


Creatine Kinase  102   83


 


CK-MB (CK-2)   2.95 


 


Troponin I   0.015 


 


NT-Pro-B Natriuret Pep   














  01/12/19





  06:20


 


Creatine Kinase 


 


CK-MB (CK-2)  2.36


 


Troponin I  < 0.012


 


NT-Pro-B Natriuret Pep 











Impressions: 


                                        





Chest X-Ray  01/11/19 00:00


IMPRESSION: 


 


No evidence of acute cardiopulmonary disease.


 


 


 








Hip X-Ray  01/11/19 00:00


IMPRESSION:  Intertrochanteric fracture of the left hip.


 














Assessment & Plan





- Diagnosis


(1) Fracture, intertrochanteric, left femur


Qualifiers: 


   Encounter type: initial encounter   Fracture type: closed   Fracture 

alignment: displaced   Qualified Code(s): S72.142A - Displaced intertrochanteric

fracture of left femur, initial encounter for closed fracture   


Is this a current diagnosis for this admission?: Yes   


Plan: 


Patient sustained a intertrochanteric fracture.  Today we discussed findings and

treatment options including operative versus nonoperative intervention patient 

is a community ambulator and thus decision was made to proceed with operative 

treatment.  Patient and family understand he is at increased risk of anesthesia 

risks given his history of atrial fibrillation but despite this fact I feel 

operative intervention is warranted in patient's situation.  After discussing 

the options with the patient and wife joint decision was made to proceed with 

operative treatment which includes left cephalo-medullary nail intertrochanteric

fracture.  Risks discussed include anesthetic complications, excessive bleeding,

infection, injury to surrounding nerves, vessels and tendons, bruising, healing 

difficulties, scar formation, posttraumatic arthritis and any unforseen 

complication.  Patient has been medically optimized for operative intervention 

we will proceed on 1/13/19

## 2019-01-13 LAB
ADD MANUAL DIFF: NO
ALBUMIN SERPL-MCNC: 2.7 G/DL (ref 3.5–5)
ALP SERPL-CCNC: 60 U/L (ref 38–126)
ALT SERPL-CCNC: 26 U/L (ref 21–72)
ANION GAP SERPL CALC-SCNC: 5 MMOL/L (ref 5–19)
APTT BLD: 41.4 SEC (ref 23.5–35.8)
AST SERPL-CCNC: 23 U/L (ref 17–59)
BASOPHILS # BLD AUTO: 0 10^3/UL (ref 0–0.2)
BASOPHILS NFR BLD AUTO: 0.5 % (ref 0–2)
BILIRUB DIRECT SERPL-MCNC: 0.2 MG/DL (ref 0–0.4)
BILIRUB SERPL-MCNC: 0.6 MG/DL (ref 0.2–1.3)
BUN SERPL-MCNC: 22 MG/DL (ref 7–20)
CALCIUM: 7.5 MG/DL (ref 8.4–10.2)
CHLORIDE SERPL-SCNC: 113 MMOL/L (ref 98–107)
CO2 SERPL-SCNC: 26 MMOL/L (ref 22–30)
EOSINOPHIL # BLD AUTO: 0.2 10^3/UL (ref 0–0.6)
EOSINOPHIL NFR BLD AUTO: 2.7 % (ref 0–6)
ERYTHROCYTE [DISTWIDTH] IN BLOOD BY AUTOMATED COUNT: 13.2 % (ref 11.5–14)
GLUCOSE SERPL-MCNC: 107 MG/DL (ref 75–110)
HCT VFR BLD CALC: 25.2 % (ref 37.9–51)
HGB BLD-MCNC: 8.7 G/DL (ref 13.5–17)
INR PPP: 1.18
LYMPHOCYTES # BLD AUTO: 1.2 10^3/UL (ref 0.5–4.7)
LYMPHOCYTES NFR BLD AUTO: 15.3 % (ref 13–45)
MCH RBC QN AUTO: 31.9 PG (ref 27–33.4)
MCHC RBC AUTO-ENTMCNC: 34.4 G/DL (ref 32–36)
MCV RBC AUTO: 93 FL (ref 80–97)
MONOCYTES # BLD AUTO: 0.7 10^3/UL (ref 0.1–1.4)
MONOCYTES NFR BLD AUTO: 9.6 % (ref 3–13)
NEUTROPHILS # BLD AUTO: 5.5 10^3/UL (ref 1.7–8.2)
NEUTS SEG NFR BLD AUTO: 71.9 % (ref 42–78)
PLATELET # BLD: 150 10^3/UL (ref 150–450)
POTASSIUM SERPL-SCNC: 3.4 MMOL/L (ref 3.6–5)
PROT SERPL-MCNC: 5 G/DL (ref 6.3–8.2)
PROTHROMBIN TIME: 15.6 SEC (ref 11.4–15.4)
RBC # BLD AUTO: 2.71 10^6/UL (ref 4.35–5.55)
SODIUM SERPL-SCNC: 143.6 MMOL/L (ref 137–145)
TOTAL CELLS COUNTED % (AUTO): 100 %
WBC # BLD AUTO: 7.6 10^3/UL (ref 4–10.5)

## 2019-01-13 RX ADMIN — VENLAFAXINE HYDROCHLORIDE SCH MG: 75 CAPSULE, EXTENDED RELEASE ORAL at 09:12

## 2019-01-13 RX ADMIN — MULTIVITAMIN TABLET SCH TAB: TABLET at 09:18

## 2019-01-13 RX ADMIN — BUPROPION HYDROCHLORIDE SCH MG: 100 TABLET, FILM COATED ORAL at 13:31

## 2019-01-13 RX ADMIN — DILTIAZEM HYDROCHLORIDE SCH MG: 240 CAPSULE, EXTENDED RELEASE ORAL at 09:18

## 2019-01-13 RX ADMIN — TAMSULOSIN HYDROCHLORIDE SCH MG: 0.4 CAPSULE ORAL at 17:40

## 2019-01-13 RX ADMIN — METOPROLOL SUCCINATE SCH MG: 25 TABLET, EXTENDED RELEASE ORAL at 21:23

## 2019-01-13 RX ADMIN — CIPROFLOXACIN SCH MLS/HR: 2 INJECTION, SOLUTION INTRAVENOUS at 09:12

## 2019-01-13 RX ADMIN — BUPROPION HYDROCHLORIDE SCH MG: 100 TABLET, FILM COATED ORAL at 21:23

## 2019-01-13 RX ADMIN — CIPROFLOXACIN SCH MLS/HR: 2 INJECTION, SOLUTION INTRAVENOUS at 21:24

## 2019-01-13 RX ADMIN — METOPROLOL SUCCINATE SCH MG: 25 TABLET, EXTENDED RELEASE ORAL at 09:12

## 2019-01-13 RX ADMIN — SODIUM CHLORIDE PRN MLS/HR: 9 INJECTION, SOLUTION INTRAVENOUS at 06:02

## 2019-01-13 RX ADMIN — SIMVASTATIN SCH MG: 10 TABLET, FILM COATED ORAL at 21:23

## 2019-01-13 RX ADMIN — ENOXAPARIN SODIUM SCH: 40 INJECTION SUBCUTANEOUS at 09:19

## 2019-01-13 RX ADMIN — BUPROPION HYDROCHLORIDE SCH MG: 100 TABLET, FILM COATED ORAL at 06:01

## 2019-01-13 NOTE — PDOC PROGRESS REPORT
Subjective


Progress Note for:: 01/13/19


Subjective:: 





Patient was supposed to have surgery today but anesthesiologist was concerned 

because of prolongation of PT/PTT because he intended to do spinal anesthesia 

and he felt that he might bleed.  The prolongation of PT PTT is most likely from

Lovenox, she is on Lovenox for VTE prophylaxis, Lovenox to be discontinued.  He 

has no history of liver disease that would make me suspect coagulopathy but I 

will request for the analysis of factor VIII, factor VII, factor V


Reason For Visit: 


CLOSED FRACTURE LEFT HIP








Physical Exam


Vital Signs: 


                                        











Temp Pulse Resp BP Pulse Ox


 


 98.5 F   69   20   133/57 H  91 L


 


 01/13/19 11:36  01/13/19 11:36  01/13/19 11:36  01/13/19 11:36  01/13/19 11:36








                                 Intake & Output











 01/12/19 01/13/19 01/14/19





 06:59 06:59 06:59


 


Intake Total 160 2116 200


 


Output Total 380 600 


 


Balance -220 1516 200


 


Weight 75.1 kg 77.9 kg 











General appearance: PRESENT: no acute distress


Eye exam: PRESENT: PERRLA


Respiratory exam: PRESENT: clear to auscultation beatrice


Cardiovascular exam: PRESENT: +S1, +S2


Neurological exam: PRESENT: alert





Results


Laboratory Results: 


                                        





                                 01/13/19 04:39 





                                 01/13/19 04:39 





                                        











  01/13/19 01/13/19 01/13/19





  04:39 04:39 08:33


 


WBC  7.6  


 


RBC  2.71 L  


 


Hgb  8.7 L  


 


Hct  25.2 L  


 


MCV  93  


 


MCH  31.9  


 


MCHC  34.4  


 


RDW  13.2  


 


Plt Count  150  


 


Seg Neutrophils %  71.9  


 


Lymphocytes %  15.3  


 


Monocytes %  9.6  


 


Eosinophils %  2.7  


 


Basophils %  0.5  


 


Absolute Neutrophils  5.5  


 


Absolute Lymphocytes  1.2  


 


Absolute Monocytes  0.7  


 


Absolute Eosinophils  0.2  


 


Absolute Basophils  0.0  


 


Sodium   143.6 


 


Potassium   3.4 L 


 


Chloride   113 H 


 


Carbon Dioxide   26 


 


Anion Gap   5 


 


BUN   22 H 


 


Creatinine   0.96 


 


Est GFR ( Amer)   > 60 


 


Est GFR (Non-Af Amer)   > 60 


 


Glucose   107 


 


Calcium   7.5 L 


 


Blood Type    O POSITIVE


 


Antibody Screen    NEGATIVE








                                        











  01/11/19 01/11/19 01/11/19





  18:42 18:42 18:42


 


Creatine Kinase   132 


 


CK-MB (CK-2)    3.44


 


Troponin I    0.020


 


NT-Pro-B Natriuret Pep  1710 H  














  01/12/19 01/12/19 01/12/19





  00:48 00:48 06:20


 


Creatine Kinase  102   83


 


CK-MB (CK-2)   2.95 


 


Troponin I   0.015 


 


NT-Pro-B Natriuret Pep   














  01/12/19





  06:20


 


Creatine Kinase 


 


CK-MB (CK-2)  2.36


 


Troponin I  < 0.012


 


NT-Pro-B Natriuret Pep 











Impressions: 


                                        





Chest X-Ray  01/11/19 00:00


IMPRESSION: 


 


No evidence of acute cardiopulmonary disease.


 


 


 








Hip X-Ray  01/11/19 00:00


IMPRESSION:  Intertrochanteric fracture of the left hip.


 














Assessment & Plan





- Diagnosis


(1) Fracture, intertrochanteric, left femur


Qualifiers: 


   Encounter type: initial encounter   Fracture type: closed   Fracture 

alignment: displaced   Qualified Code(s): S72.142A - Displaced intertrochanteric

fracture of left femur, initial encounter for closed fracture   


Is this a current diagnosis for this admission?: Yes   





(2) Preprocedural cardiovascular examination


Is this a current diagnosis for this admission?: Yes   





(3) T2DM (type 2 diabetes mellitus)


Qualifiers: 


   Diabetes mellitus long term insulin use: without long term use   Diabetes 

mellitus complication status: without complication   Qualified Code(s): E11.9 - 

Type 2 diabetes mellitus without complications   


Is this a current diagnosis for this admission?: Yes   





(4) Urinary tract infection


Qualifiers: 


   Urinary tract infection type: acute cystitis   Hematuria presence: without 

hematuria   Qualified Code(s): N30.00 - Acute cystitis without hematuria   


Is this a current diagnosis for this admission?: Yes   


Plan: 


Urine culture is growing gram-negative rods patient already on IV antibiotic 

emprically








(5) Medication induced coagulopathy


Is this a current diagnosis for this admission?: Yes   


Plan: 


Coagulopathy is most likely from Lovenox, he has no history of liver disease for

me to suspect deficiency of coagulation factors.  Factor V factor VII factor 

VIII analysis is requested

## 2019-01-13 NOTE — PDOC PROGRESS REPORT
Subjective


Progress Note for:: 01/13/19


Subjective:: 





Patient lying in bed comfortably.  Pain currently tolerable.  No issues 

overnight.  Denies chest pain or shortness of breath.


Reason For Visit: 


CLOSED FRACTURE LEFT HIP








Physical Exam


Vital Signs: 


                                        











Temp Pulse Resp BP Pulse Ox


 


 98.9 F   65   18   160/64 H  94 


 


 01/13/19 07:55  01/13/19 07:55  01/13/19 07:55  01/13/19 07:55  01/13/19 07:55








                                 Intake & Output











 01/12/19 01/13/19 01/14/19





 06:59 06:59 06:59


 


Intake Total 160 2116 


 


Output Total 380 600 


 


Balance -220 1516 


 


Weight 75.1 kg 77.9 kg 











Musculoskeletal exam: PRESENT: other - Left lower extremity: Short/external 

rotated.  Pain with logroll.  Intact plantar flexion/dorsiflexion.  No calf 

tenderness.  No sensory deficits.





Results


Laboratory Results: 


                                        





                                 01/13/19 04:39 





                                 01/13/19 04:39 





                                        











  01/13/19 01/13/19 01/13/19





  04:39 04:39 08:33


 


WBC  7.6  


 


RBC  2.71 L  


 


Hgb  8.7 L  


 


Hct  25.2 L  


 


MCV  93  


 


MCH  31.9  


 


MCHC  34.4  


 


RDW  13.2  


 


Plt Count  150  


 


Seg Neutrophils %  71.9  


 


Lymphocytes %  15.3  


 


Monocytes %  9.6  


 


Eosinophils %  2.7  


 


Basophils %  0.5  


 


Absolute Neutrophils  5.5  


 


Absolute Lymphocytes  1.2  


 


Absolute Monocytes  0.7  


 


Absolute Eosinophils  0.2  


 


Absolute Basophils  0.0  


 


Sodium   143.6 


 


Potassium   3.4 L 


 


Chloride   113 H 


 


Carbon Dioxide   26 


 


Anion Gap   5 


 


BUN   22 H 


 


Creatinine   0.96 


 


Est GFR ( Amer)   > 60 


 


Est GFR (Non-Af Amer)   > 60 


 


Glucose   107 


 


Calcium   7.5 L 


 


Blood Type    O POSITIVE


 


Antibody Screen    NEGATIVE








                                        











  01/11/19 01/11/19 01/11/19





  18:42 18:42 18:42


 


Creatine Kinase   132 


 


CK-MB (CK-2)    3.44


 


Troponin I    0.020


 


NT-Pro-B Natriuret Pep  1710 H  














  01/12/19 01/12/19 01/12/19





  00:48 00:48 06:20


 


Creatine Kinase  102   83


 


CK-MB (CK-2)   2.95 


 


Troponin I   0.015 


 


NT-Pro-B Natriuret Pep   














  01/12/19





  06:20


 


Creatine Kinase 


 


CK-MB (CK-2)  2.36


 


Troponin I  < 0.012


 


NT-Pro-B Natriuret Pep 











Impressions: 


                                        





Chest X-Ray  01/11/19 00:00


IMPRESSION: 


 


No evidence of acute cardiopulmonary disease.


 


 


 








Hip X-Ray  01/11/19 00:00


IMPRESSION:  Intertrochanteric fracture of the left hip.


 














Assessment & Plan





- Diagnosis


(1) Fracture, intertrochanteric, left femur


Qualifiers: 


   Encounter type: initial encounter   Fracture type: closed   Fracture 

alignment: displaced   Qualified Code(s): S72.142A - Displaced intertrochanteric

fracture of left femur, initial encounter for closed fracture   


Is this a current diagnosis for this admission?: Yes   


Plan: 


Patient sustained a intertrochanteric fracture.  Today we discussed findings and

treatment options including operative versus nonoperative intervention patient 

is a community ambulator and thus decision was made to proceed with operative 

treatment.  Patient and family understand he is at increased risk of anesthesia 

risks given his history of atrial fibrillation but despite this fact I feel 

operative intervention is warranted in patient's situation.  After discussing 

the options with the patient and wife joint decision was made to proceed with 

operative treatment which includes left cephalo-medullary nail intertrochanteric

fracture.  Risks discussed include anesthetic complications, excessive bleeding,

infection, injury to surrounding nerves, vessels and tendons, bruising, healing 

difficulties, scar formation, posttraumatic arthritis and any unforseen 

complication.  Patient surgery has been postponed given anesthesia request that 

they obtain a spinal as opposed to general anesthesia will await PT/PTT/INR at 

the appropriate level for anesthesia to comfortably proceed with operative 

intervention.

## 2019-01-14 LAB
ADD MANUAL DIFF: NO
ANION GAP SERPL CALC-SCNC: 4 MMOL/L (ref 5–19)
APTT BLD: 39.2 SEC (ref 23.5–35.8)
BASOPHILS # BLD AUTO: 0.1 10^3/UL (ref 0–0.2)
BASOPHILS NFR BLD AUTO: 0.7 % (ref 0–2)
BUN SERPL-MCNC: 17 MG/DL (ref 7–20)
CALCIUM: 7.7 MG/DL (ref 8.4–10.2)
CHLORIDE SERPL-SCNC: 116 MMOL/L (ref 98–107)
CO2 SERPL-SCNC: 25 MMOL/L (ref 22–30)
EOSINOPHIL # BLD AUTO: 0.2 10^3/UL (ref 0–0.6)
EOSINOPHIL NFR BLD AUTO: 3 % (ref 0–6)
ERYTHROCYTE [DISTWIDTH] IN BLOOD BY AUTOMATED COUNT: 13.4 % (ref 11.5–14)
GLUCOSE SERPL-MCNC: 108 MG/DL (ref 75–110)
HCT VFR BLD CALC: 25.9 % (ref 37.9–51)
HGB BLD-MCNC: 8.8 G/DL (ref 13.5–17)
INR PPP: 1.12
LYMPHOCYTES # BLD AUTO: 1.3 10^3/UL (ref 0.5–4.7)
LYMPHOCYTES NFR BLD AUTO: 16 % (ref 13–45)
MCH RBC QN AUTO: 31.4 PG (ref 27–33.4)
MCHC RBC AUTO-ENTMCNC: 33.9 G/DL (ref 32–36)
MCV RBC AUTO: 93 FL (ref 80–97)
MONOCYTES # BLD AUTO: 0.7 10^3/UL (ref 0.1–1.4)
MONOCYTES NFR BLD AUTO: 9.3 % (ref 3–13)
NEUTROPHILS # BLD AUTO: 5.6 10^3/UL (ref 1.7–8.2)
NEUTS SEG NFR BLD AUTO: 71 % (ref 42–78)
PLATELET # BLD: 153 10^3/UL (ref 150–450)
POTASSIUM SERPL-SCNC: 3.6 MMOL/L (ref 3.6–5)
PROTHROMBIN TIME: 15 SEC (ref 11.4–15.4)
RBC # BLD AUTO: 2.8 10^6/UL (ref 4.35–5.55)
SODIUM SERPL-SCNC: 144.6 MMOL/L (ref 137–145)
TOTAL CELLS COUNTED % (AUTO): 100 %
WBC # BLD AUTO: 7.9 10^3/UL (ref 4–10.5)

## 2019-01-14 PROCEDURE — 0QS736Z REPOSITION LEFT UPPER FEMUR WITH INTRAMEDULLARY INTERNAL FIXATION DEVICE, PERCUTANEOUS APPROACH: ICD-10-PCS | Performed by: ORTHOPAEDIC SURGERY

## 2019-01-14 RX ADMIN — CIPROFLOXACIN SCH MLS/HR: 2 INJECTION, SOLUTION INTRAVENOUS at 21:22

## 2019-01-14 RX ADMIN — VENLAFAXINE HYDROCHLORIDE SCH MG: 75 CAPSULE, EXTENDED RELEASE ORAL at 14:33

## 2019-01-14 RX ADMIN — MULTIVITAMIN TABLET SCH TAB: TABLET at 14:33

## 2019-01-14 RX ADMIN — SODIUM CHLORIDE PRN MLS/HR: 9 INJECTION, SOLUTION INTRAVENOUS at 05:43

## 2019-01-14 RX ADMIN — OXYCODONE HYDROCHLORIDE PRN MG: 5 TABLET ORAL at 14:32

## 2019-01-14 RX ADMIN — BUPROPION HYDROCHLORIDE SCH: 100 TABLET, FILM COATED ORAL at 05:29

## 2019-01-14 RX ADMIN — METOPROLOL SUCCINATE SCH MG: 25 TABLET, EXTENDED RELEASE ORAL at 21:21

## 2019-01-14 RX ADMIN — METOPROLOL SUCCINATE SCH MG: 25 TABLET, EXTENDED RELEASE ORAL at 14:33

## 2019-01-14 RX ADMIN — BUPROPION HYDROCHLORIDE SCH MG: 100 TABLET, FILM COATED ORAL at 21:22

## 2019-01-14 RX ADMIN — SIMVASTATIN SCH MG: 10 TABLET, FILM COATED ORAL at 21:22

## 2019-01-14 RX ADMIN — DILTIAZEM HYDROCHLORIDE SCH MG: 240 CAPSULE, EXTENDED RELEASE ORAL at 14:35

## 2019-01-14 RX ADMIN — BUPROPION HYDROCHLORIDE SCH MG: 100 TABLET, FILM COATED ORAL at 14:32

## 2019-01-14 RX ADMIN — CIPROFLOXACIN SCH MLS: 2 INJECTION, SOLUTION INTRAVENOUS at 10:15

## 2019-01-14 RX ADMIN — TAMSULOSIN HYDROCHLORIDE SCH MG: 0.4 CAPSULE ORAL at 17:33

## 2019-01-14 NOTE — RADIOLOGY REPORT (SQ)
EXAM DESCRIPTION:  NO CHG FLUORO; HIP IN OPERATING RM



COMPLETED DATE/TIME:  1/14/2019 11:21 am



REASON FOR STUDY:  ORIF LEFT HIP ASST WITH FLUORO IN OR



COMPARISON:  None.



FLUOROSCOPY TIME:  1.0 minutes

4 images saved to PACS.



TECHNIQUE:  Intra-operative images acquired during surgical procedure to evaluate progress.

NUMBER OF IMAGES: 4



LIMITATIONS:  None.



FINDINGS:  Selected images from lino and dynamic hip compression screw fixation of intertrochanteric f
emoral neck fracture.



IMPRESSION:  IMAGE(S) OBTAINED DURING PROCEDURE.



COMMENT:  Quality :  Final reports for procedures using fluoroscopy that document radiation exp
osure indices, or exposure time and number of fluorographic images (if radiation exposure indices are
 not available)

Please consult full operative report of the attending physician for description of the procedure.



TECHNICAL DOCUMENTATION:  JOB ID:  [id number]

 2011 [employer]- All Rights Reserved



Reading location - IP/workstation name: [email or web address]

## 2019-01-14 NOTE — RADIOLOGY REPORT (SQ)
EXAM DESCRIPTION:  NO CHG FLUORO; HIP IN OPERATING RM



COMPLETED DATE/TIME:  1/14/2019 11:21 am



REASON FOR STUDY:  ORIF LEFT HIP ASST WITH FLUORO IN OR



COMPARISON:  None.



FLUOROSCOPY TIME:  1.0 minutes

4 images saved to PACS.



TECHNIQUE:  Intra-operative images acquired during surgical procedure to evaluate progress.

NUMBER OF IMAGES: 4



LIMITATIONS:  None.



FINDINGS:  Selected images from lino and dynamic hip compression screw fixation of intertrochanteric f
emoral neck fracture.



IMPRESSION:  IMAGE(S) OBTAINED DURING PROCEDURE.



COMMENT:  Quality :  Final reports for procedures using fluoroscopy that document radiation exp
osure indices, or exposure time and number of fluorographic images (if radiation exposure indices are
 not available)

Please consult full operative report of the attending physician for description of the procedure.



TECHNICAL DOCUMENTATION:  JOB ID:  [id number]

 2011 [employer]- All Rights Reserved



Reading location - IP/workstation name: [email or web address]

## 2019-01-14 NOTE — PDOC PROGRESS REPORT
Subjective


Progress Note for:: 01/14/19


Subjective:: 





Patient had ORIF of the left femur today, still drowsy from anesthesia


Reason For Visit: 


CLOSED FRACTURE LEFT HIP








Physical Exam


Vital Signs: 


                                        











Temp Pulse Resp BP Pulse Ox


 


 98.4 F   59 L  18   149/52 H  95 


 


 01/14/19 17:10  01/14/19 17:10  01/14/19 17:10  01/14/19 17:10  01/14/19 17:10








                                 Intake & Output











 01/13/19 01/14/19 01/15/19





 06:59 06:59 06:59


 


Intake Total 2116 2112 1686


 


Output Total 600 850 450


 


Balance 1516 1262 1236


 


Weight 77.9 kg 79.3 kg 











Eye exam: PRESENT: PERRLA


Respiratory exam: PRESENT: clear to auscultation beatrice


Cardiovascular exam: PRESENT: +S1, +S2





Results


Laboratory Results: 


                                        





                                 01/14/19 06:30 





                                 01/14/19 06:30 





                                        











  01/14/19 01/14/19





  06:30 06:30


 


WBC  7.9 


 


RBC  2.80 L 


 


Hgb  8.8 L 


 


Hct  25.9 L 


 


MCV  93 


 


MCH  31.4 


 


MCHC  33.9 


 


RDW  13.4 


 


Plt Count  153 


 


Seg Neutrophils %  71.0 


 


Lymphocytes %  16.0 


 


Monocytes %  9.3 


 


Eosinophils %  3.0 


 


Basophils %  0.7 


 


Absolute Neutrophils  5.6 


 


Absolute Lymphocytes  1.3 


 


Absolute Monocytes  0.7 


 


Absolute Eosinophils  0.2 


 


Absolute Basophils  0.1 


 


Sodium   144.6


 


Potassium   3.6


 


Chloride   116 H


 


Carbon Dioxide   25


 


Anion Gap   4 L


 


BUN   17


 


Creatinine   0.88


 


Est GFR ( Amer)   > 60


 


Est GFR (Non-Af Amer)   > 60


 


Glucose   108


 


Calcium   7.7 L








                                        





01/11/19 22:58   Catheterized Urine   Urine Culture - Final


                            Proteus Mirabilis


01/12/19 12:00   Clean Catch Midstream   Urine Culture - Final


                            Proteus Mirabilis





                                        











  01/11/19 01/11/19 01/11/19





  18:42 18:42 18:42


 


Creatine Kinase   132 


 


CK-MB (CK-2)    3.44


 


Troponin I    0.020


 


NT-Pro-B Natriuret Pep  1710 H  














  01/12/19 01/12/19 01/12/19





  00:48 00:48 06:20


 


Creatine Kinase  102   83


 


CK-MB (CK-2)   2.95 


 


Troponin I   0.015 


 


NT-Pro-B Natriuret Pep   














  01/12/19





  06:20


 


Creatine Kinase 


 


CK-MB (CK-2)  2.36


 


Troponin I  < 0.012


 


NT-Pro-B Natriuret Pep 











Impressions: 


                                        





Chest X-Ray  01/11/19 00:00


IMPRESSION: 


 


No evidence of acute cardiopulmonary disease.


 


 


 








Fluoroscopy  01/14/19 00:00


IMPRESSION:  IMAGE(S) OBTAINED DURING PROCEDURE.


 








Hip X-Ray  01/14/19 00:00


IMPRESSION:  IMAGE(S) OBTAINED DURING PROCEDURE.


 














Assessment & Plan





- Diagnosis


(1) Fracture, intertrochanteric, left femur


Qualifiers: 


   Encounter type: initial encounter   Fracture type: closed   Fracture 

alignment: displaced   Qualified Code(s): S72.142A - Displaced intertrochanteric

fracture of left femur, initial encounter for closed fracture   


Is this a current diagnosis for this admission?: Yes   


Plan: 


Status post ORIF of left femur








(2) Preprocedural cardiovascular examination


Is this a current diagnosis for this admission?: Yes   





(3) T2DM (type 2 diabetes mellitus)


Qualifiers: 


   Diabetes mellitus long term insulin use: without long term use   Diabetes 

mellitus complication status: without complication   Qualified Code(s): E11.9 - 

Type 2 diabetes mellitus without complications   


Is this a current diagnosis for this admission?: Yes   





(4) Urinary tract infection


Qualifiers: 


   Urinary tract infection type: acute cystitis   Hematuria presence: without 

hematuria   Qualified Code(s): N30.00 - Acute cystitis without hematuria   


Is this a current diagnosis for this admission?: Yes   





(5) Medication induced coagulopathy


Is this a current diagnosis for this admission?: Yes   





(6) Urinary tract infection due to Proteus


Is this a current diagnosis for this admission?: Yes   


Plan: 


Urine culture grew Proteus mirabilis, continue IV Cipro

## 2019-01-14 NOTE — OPERATIVE REPORT
Operative Report


DATE OF SURGERY: 01/14/19


PREOPERATIVE DIAGNOSIS: Left intratrochanteric femur fracture


OPERATION: Open reduction internal fixation left intertrochanteric femur 

fracture


SURGEON: KRISTEL MILES


ANESTHESIA: Spinal


ESTIMATED BLOOD LOSS: 50


PROCEDURE: 





With the patient supine on the table the left lower extremity is manipulated 

fluoroscopic guidance to affect a near anatomic reduction.  It subsequently 

prepped and draped in a sterile fashion.  Under fluoroscopic guidance a pin is 

placed percutaneously through the greater trochanter down into the proximal 

metadiaphysis.  A combined reamer was then used to fashion a cortical opening.





The combined reamer and pin are subsequently removed.  A ball-tipped guide rods 

placed down the femur and its length measured to be 360 mm.  Subsequently a 

[employer] gamma 3 nail 11 mm x 125 degrees x 360 mm is advanced over the ball-

tipped guide lino for appropriate depth for the proximal interlock.  Subsequently

a 95 mm proximal interlock is placed.  Under fluoroscopic guidance a 52.5 mm 

distal interlock is placed.  The entire construct is examined fluoroscopically 

for both fracture reduction and hardware placement felt to be adequate.





The wounds are irrigated with bulb lavage.  The closure is interrupted Vicryl 

followed by staples.  A sterile compressive dressing is applied and patient's 

return to PACU in satisfactory condition.

## 2019-01-15 LAB
ANION GAP SERPL CALC-SCNC: 3 MMOL/L (ref 5–19)
BUN SERPL-MCNC: 14 MG/DL (ref 7–20)
CALCIUM: 7.4 MG/DL (ref 8.4–10.2)
CHLORIDE SERPL-SCNC: 112 MMOL/L (ref 98–107)
CO2 SERPL-SCNC: 25 MMOL/L (ref 22–30)
ERYTHROCYTE [DISTWIDTH] IN BLOOD BY AUTOMATED COUNT: 13.5 % (ref 11.5–14)
FACT V ACT/NOR PPP: 124 % (ref 70–150)
GLUCOSE SERPL-MCNC: 104 MG/DL (ref 75–110)
HCT VFR BLD CALC: 23.6 % (ref 37.9–51)
HGB BLD-MCNC: 8.1 G/DL (ref 13.5–17)
MCH RBC QN AUTO: 31.9 PG (ref 27–33.4)
MCHC RBC AUTO-ENTMCNC: 34.5 G/DL (ref 32–36)
MCV RBC AUTO: 93 FL (ref 80–97)
PLATELET # BLD: 161 10^3/UL (ref 150–450)
POTASSIUM SERPL-SCNC: 3.2 MMOL/L (ref 3.6–5)
RBC # BLD AUTO: 2.55 10^6/UL (ref 4.35–5.55)
SODIUM SERPL-SCNC: 140.1 MMOL/L (ref 137–145)
WBC # BLD AUTO: 9.1 10^3/UL (ref 4–10.5)

## 2019-01-15 PROCEDURE — 30233N1 TRANSFUSION OF NONAUTOLOGOUS RED BLOOD CELLS INTO PERIPHERAL VEIN, PERCUTANEOUS APPROACH: ICD-10-PCS | Performed by: ORTHOPAEDIC SURGERY

## 2019-01-15 RX ADMIN — BUPROPION HYDROCHLORIDE SCH MG: 100 TABLET, FILM COATED ORAL at 05:38

## 2019-01-15 RX ADMIN — VENLAFAXINE HYDROCHLORIDE SCH MG: 75 CAPSULE, EXTENDED RELEASE ORAL at 09:50

## 2019-01-15 RX ADMIN — DILTIAZEM HYDROCHLORIDE SCH: 240 CAPSULE, EXTENDED RELEASE ORAL at 14:15

## 2019-01-15 RX ADMIN — MULTIVITAMIN TABLET SCH TAB: TABLET at 09:50

## 2019-01-15 RX ADMIN — BUPROPION HYDROCHLORIDE SCH MG: 100 TABLET, FILM COATED ORAL at 14:15

## 2019-01-15 RX ADMIN — METOPROLOL SUCCINATE SCH MG: 25 TABLET, EXTENDED RELEASE ORAL at 09:50

## 2019-01-15 RX ADMIN — BUPROPION HYDROCHLORIDE SCH MG: 100 TABLET, FILM COATED ORAL at 22:13

## 2019-01-15 RX ADMIN — OXYCODONE HYDROCHLORIDE PRN MG: 5 TABLET ORAL at 18:33

## 2019-01-15 RX ADMIN — ENOXAPARIN SODIUM SCH MG: 40 INJECTION SUBCUTANEOUS at 09:50

## 2019-01-15 RX ADMIN — CIPROFLOXACIN SCH MLS/HR: 2 INJECTION, SOLUTION INTRAVENOUS at 22:12

## 2019-01-15 RX ADMIN — SIMVASTATIN SCH MG: 10 TABLET, FILM COATED ORAL at 22:12

## 2019-01-15 RX ADMIN — CIPROFLOXACIN SCH MLS/HR: 2 INJECTION, SOLUTION INTRAVENOUS at 10:44

## 2019-01-15 RX ADMIN — TAMSULOSIN HYDROCHLORIDE SCH MG: 0.4 CAPSULE ORAL at 17:23

## 2019-01-15 RX ADMIN — OXYCODONE HYDROCHLORIDE PRN MG: 5 TABLET ORAL at 05:38

## 2019-01-15 RX ADMIN — ASPIRIN SCH MG: 81 TABLET, COATED ORAL at 09:50

## 2019-01-15 RX ADMIN — METOPROLOL SUCCINATE SCH MG: 25 TABLET, EXTENDED RELEASE ORAL at 22:12

## 2019-01-15 NOTE — PDOC PROGRESS REPORT
Subjective


Progress Note for:: 01/15/19


Subjective:: 





Patient seen by the bedside alert oriented no new complaints


Reason For Visit: 


CLOSED FRACTURE LEFT HIP








Physical Exam


Vital Signs: 


                                        











Temp Pulse Resp BP Pulse Ox


 


 99.7 F   67   20   165/75 H  92 


 


 01/15/19 17:45  01/15/19 17:45  01/15/19 17:45  01/15/19 17:45  01/15/19 17:45








                                 Intake & Output











 01/14/19 01/15/19 01/16/19





 06:59 06:59 06:59


 


Intake Total 2112 2596 1155


 


Output Total 850 450 


 


Balance 1262 2146 1155


 


Weight 79.3 kg 99.4 kg 











General appearance: PRESENT: no acute distress


Eye exam: PRESENT: PERRLA


Respiratory exam: PRESENT: clear to auscultation beatrice


Cardiovascular exam: PRESENT: +S1, +S2


GI/Abdominal exam: PRESENT: soft


Neurological exam: PRESENT: alert, CN II-XII grossly intact





Results


Laboratory Results: 


                                        





                                 01/15/19 05:24 





                                 01/15/19 05:24 





                                        











  01/13/19 01/15/19 01/15/19





  08:33 05:24 05:24


 


WBC   9.1 


 


RBC   2.55 L 


 


Hgb   8.1 L 


 


Hct   23.6 L 


 


MCV   93 


 


MCH   31.9 


 


MCHC   34.5 


 


RDW   13.5 


 


Plt Count   161 


 


Sodium    140.1


 


Potassium    3.2 L


 


Chloride    112 H


 


Carbon Dioxide    25


 


Anion Gap    3 L


 


BUN    14


 


Creatinine    0.76


 


Est GFR ( Amer)    > 60


 


Est GFR (Non-Af Amer)    > 60


 


Glucose    104


 


Calcium    7.4 L


 


Blood Type  O POSITIVE  


 


Antibody Screen  NEGATIVE  








                                        











  01/11/19 01/11/19 01/11/19





  18:42 18:42 18:42


 


Creatine Kinase   132 


 


CK-MB (CK-2)    3.44


 


Troponin I    0.020


 


NT-Pro-B Natriuret Pep  1710 H  














  01/12/19 01/12/19 01/12/19





  00:48 00:48 06:20


 


Creatine Kinase  102   83


 


CK-MB (CK-2)   2.95 


 


Troponin I   0.015 


 


NT-Pro-B Natriuret Pep   














  01/12/19





  06:20


 


Creatine Kinase 


 


CK-MB (CK-2)  2.36


 


Troponin I  < 0.012


 


NT-Pro-B Natriuret Pep 











Impressions: 


                                        





Chest X-Ray  01/11/19 00:00


IMPRESSION: 


 


No evidence of acute cardiopulmonary disease.


 


 


 








Fluoroscopy  01/14/19 00:00


IMPRESSION:  IMAGE(S) OBTAINED DURING PROCEDURE.


 








Hip X-Ray  01/14/19 00:00


IMPRESSION:  IMAGE(S) OBTAINED DURING PROCEDURE.


 














Assessment & Plan





- Diagnosis


(1) Fracture, intertrochanteric, left femur


Qualifiers: 


   Encounter type: initial encounter   Fracture type: closed   Fracture 

alignment: displaced   Qualified Code(s): S72.142A - Displaced intertrochanteric

fracture of left femur, initial encounter for closed fracture   


Is this a current diagnosis for this admission?: Yes   





(2) Preprocedural cardiovascular examination


Is this a current diagnosis for this admission?: Yes   





(3) T2DM (type 2 diabetes mellitus)


Qualifiers: 


   Diabetes mellitus long term insulin use: without long term use   Diabetes 

mellitus complication status: without complication   Qualified Code(s): E11.9 - 

Type 2 diabetes mellitus without complications   


Is this a current diagnosis for this admission?: Yes   





(4) Urinary tract infection


Qualifiers: 


   Urinary tract infection type: acute cystitis   Hematuria presence: without 

hematuria   Qualified Code(s): N30.00 - Acute cystitis without hematuria   


Is this a current diagnosis for this admission?: Yes   





(5) Medication induced coagulopathy


Is this a current diagnosis for this admission?: Yes   





(6) Urinary tract infection due to Proteus


Is this a current diagnosis for this admission?: Yes   


Plan: 


Urine culture grew Proteus mirabilis, continue IV Cipro








- Plan Summary


Plan Summary: 





Continue present treatment

## 2019-01-15 NOTE — PDOC PROGRESS REPORT
Subjective


Progress Note for:: 01/15/19


Reason For Visit: 


CLOSED FRACTURE LEFT HIP


87-year-old white male now postop day 1 status post open reduction internal 

fixation of the left intratrochanteric femur fracture.  No complaints overnight.

 Hematocrit is down to 23%.





Physical Exam


Vital Signs: 


                                        











Temp Pulse Resp BP Pulse Ox


 


 36.9 C   64   19   132/64 H  97 


 


 01/14/19 20:00  01/14/19 20:00  01/14/19 20:00  01/14/19 20:00  01/14/19 20:00








                                 Intake & Output











 01/13/19 01/14/19 01/15/19





 06:59 06:59 06:59


 


Intake Total 2116 2112 2596


 


Output Total 600 850 450


 


Balance 1516 1262 2146


 


Weight 77.9 kg 79.3 kg 99.4 kg











General appearance: PRESENT: no acute distress


Head exam: PRESENT: normocephalic


Respiratory exam: PRESENT: unlabored


Cardiovascular exam: PRESENT: RRR


Pulses: PRESENT: +1 pedal pulses bilateral


Vascular exam: PRESENT: normal capillary refill


GI/Abdominal exam: PRESENT: soft


Rectal exam: PRESENT: deferred


Extremities exam: PRESENT: other - Slight serosanguineous drainage on the upper 

to dressings.  Lower dressing is dry.  Leg lengths are equal.  Distal 

neurovascular examination is intact.


Neurological exam: PRESENT: alert, awake, oriented to person, oriented to place,

oriented to time, oriented to situation.  ABSENT: motor sensory deficit


Skin exam: PRESENT: dry, intact, warm.  ABSENT: cyanosis, rash





Results


Laboratory Results: 


                                        





                                 01/15/19 05:24 





                                 01/15/19 05:24 





                                        











  01/14/19 01/15/19 01/15/19





  06:30 05:24 05:24


 


WBC   9.1 


 


RBC   2.55 L 


 


Hgb   8.1 L 


 


Hct   23.6 L 


 


MCV   93 


 


MCH   31.9 


 


MCHC   34.5 


 


RDW   13.5 


 


Plt Count   161 


 


Sodium  144.6   140.1


 


Potassium  3.6   3.2 L


 


Chloride  116 H   112 H


 


Carbon Dioxide  25   25


 


Anion Gap  4 L   3 L


 


BUN  17   14


 


Creatinine  0.88   0.76


 


Est GFR ( Amer)  > 60   > 60


 


Est GFR (Non-Af Amer)  > 60   > 60


 


Glucose  108   104


 


Calcium  7.7 L   7.4 L








                                        





01/11/19 22:58   Catheterized Urine   Urine Culture - Final


                            Proteus Mirabilis


01/12/19 12:00   Clean Catch Midstream   Urine Culture - Final


                            Proteus Mirabilis





                                        











  01/11/19 01/11/19 01/11/19





  18:42 18:42 18:42


 


Creatine Kinase   132 


 


CK-MB (CK-2)    3.44


 


Troponin I    0.020


 


NT-Pro-B Natriuret Pep  1710 H  














  01/12/19 01/12/19 01/12/19





  00:48 00:48 06:20


 


Creatine Kinase  102   83


 


CK-MB (CK-2)   2.95 


 


Troponin I   0.015 


 


NT-Pro-B Natriuret Pep   














  01/12/19





  06:20


 


Creatine Kinase 


 


CK-MB (CK-2)  2.36


 


Troponin I  < 0.012


 


NT-Pro-B Natriuret Pep 











Impressions: 


                                        





Chest X-Ray  01/11/19 00:00


IMPRESSION: 


 


No evidence of acute cardiopulmonary disease.


 


 


 








Fluoroscopy  01/14/19 00:00


IMPRESSION:  IMAGE(S) OBTAINED DURING PROCEDURE.


 








Hip X-Ray  01/14/19 00:00


IMPRESSION:  IMAGE(S) OBTAINED DURING PROCEDURE.


 











Status: Imported from PACS





Assessment & Plan





- Diagnosis


(1) Acute blood loss anemia


Is this a current diagnosis for this admission?: Yes   


Plan: 


Hematocrits down to 23%.  2 units of packed red blood cells have been typed and 

crossed.  Nursing has been instructed to transfuse these with a post transfusion

hematocrit tomorrow morning.








(2) Fracture, intertrochanteric, left femur


Qualifiers: 


   Encounter type: initial encounter   Fracture type: closed   Fracture 

alignment: displaced   Qualified Code(s): S72.142A - Displaced intertrochanteric

fracture of left femur, initial encounter for closed fracture   


Is this a current diagnosis for this admission?: Yes   


Plan: 


Patient can be mobilized with physical therapy and weightbearing as tolerated 

basis.








- Time


Time Spent with patient: 15-24 minutes


Anticipated discharge: SNF


Within: when bed available





- Plan Summary


Plan Summary: 





Patient can be transferred to a skilled nursing facility when bed available.

## 2019-01-16 VITALS — DIASTOLIC BLOOD PRESSURE: 53 MMHG | SYSTOLIC BLOOD PRESSURE: 149 MMHG

## 2019-01-16 LAB
ANION GAP SERPL CALC-SCNC: 5 MMOL/L (ref 5–19)
BUN SERPL-MCNC: 14 MG/DL (ref 7–20)
CALCIUM: 7.4 MG/DL (ref 8.4–10.2)
CHLORIDE SERPL-SCNC: 111 MMOL/L (ref 98–107)
CO2 SERPL-SCNC: 25 MMOL/L (ref 22–30)
ERYTHROCYTE [DISTWIDTH] IN BLOOD BY AUTOMATED COUNT: 13.5 % (ref 11.5–14)
GLUCOSE SERPL-MCNC: 93 MG/DL (ref 75–110)
HCT VFR BLD CALC: 28.1 % (ref 37.9–51)
HGB BLD-MCNC: 9.9 G/DL (ref 13.5–17)
MCH RBC QN AUTO: 32 PG (ref 27–33.4)
MCHC RBC AUTO-ENTMCNC: 35.2 G/DL (ref 32–36)
MCV RBC AUTO: 91 FL (ref 80–97)
PLATELET # BLD: 152 10^3/UL (ref 150–450)
POTASSIUM SERPL-SCNC: 3 MMOL/L (ref 3.6–5)
RBC # BLD AUTO: 3.1 10^6/UL (ref 4.35–5.55)
SODIUM SERPL-SCNC: 141.4 MMOL/L (ref 137–145)
WBC # BLD AUTO: 9.1 10^3/UL (ref 4–10.5)

## 2019-01-16 RX ADMIN — METOPROLOL SUCCINATE SCH MG: 25 TABLET, EXTENDED RELEASE ORAL at 10:07

## 2019-01-16 RX ADMIN — ENOXAPARIN SODIUM SCH MG: 40 INJECTION SUBCUTANEOUS at 10:06

## 2019-01-16 RX ADMIN — CIPROFLOXACIN SCH MLS/HR: 2 INJECTION, SOLUTION INTRAVENOUS at 10:05

## 2019-01-16 RX ADMIN — DILTIAZEM HYDROCHLORIDE SCH MG: 240 CAPSULE, EXTENDED RELEASE ORAL at 10:05

## 2019-01-16 RX ADMIN — VENLAFAXINE HYDROCHLORIDE SCH MG: 75 CAPSULE, EXTENDED RELEASE ORAL at 10:06

## 2019-01-16 RX ADMIN — BUPROPION HYDROCHLORIDE SCH MG: 100 TABLET, FILM COATED ORAL at 13:23

## 2019-01-16 RX ADMIN — OXYCODONE HYDROCHLORIDE PRN MG: 5 TABLET ORAL at 08:06

## 2019-01-16 RX ADMIN — BUPROPION HYDROCHLORIDE SCH MG: 100 TABLET, FILM COATED ORAL at 06:11

## 2019-01-16 RX ADMIN — MULTIVITAMIN TABLET SCH TAB: TABLET at 10:06

## 2019-01-16 RX ADMIN — TAMSULOSIN HYDROCHLORIDE SCH MG: 0.4 CAPSULE ORAL at 17:18

## 2019-01-16 RX ADMIN — ASPIRIN SCH MG: 81 TABLET, COATED ORAL at 10:06

## 2019-01-16 NOTE — PDOC TRANSFER SUMMARY
General





- Admit/Disc Date/PCP


Admission Date/Primary Care Provider: 


  01/11/19 14:02





  DENICE PANDEY MD





Discharge Date: 01/16/19





- Discharge Diagnosis


(1) Fracture, intertrochanteric, left femur


Is this a current diagnosis for this admission?: Yes   





(2) Preprocedural cardiovascular examination


Is this a current diagnosis for this admission?: Yes   





(3) T2DM (type 2 diabetes mellitus)


Is this a current diagnosis for this admission?: Yes   





(4) Urinary tract infection


Is this a current diagnosis for this admission?: Yes   





(5) Medication induced coagulopathy


Is this a current diagnosis for this admission?: Yes   





(6) Urinary tract infection due to Proteus


Is this a current diagnosis for this admission?: Yes   





- Additional Information


Resuscitation Status: Full Code


Prescriptions: 


Oxycodone HCl [Oxy-Ir 5 mg Tablet] 5 mg PO Q6HP PRN #60 tablet


 PRN Reason: 


Enoxaparin Sodium [Lovenox Inj 40 mg/0.4 ml Disp.syrin] 40 mg SUBCUT DAILY #99 

disp.syrin


Home Medications: 








Aspirin [Ecotrin 81 mg EC Tablet] 81 mg PO DAILY 01/11/19 


Bupropion HCl [Bupropion HCl Sr] 150 mg PO BID 01/11/19 


Diltiazem HCl [Cardizem Cd 240 mg Capsule.cr] 240 mg PO DAILY 01/11/19 


Metoprolol Succinate [Toprol Xl 25 mg Tab.sr] 25 mg PO Q12 01/11/19 


Multivitamin [Daily Multiple Vitamin] 1 tab PO DAILY 01/11/19 


Tamsulosin HCl [Flomax 0.4 mg Cap.sr] 0.4 mg PO PCSUPPER 01/11/19 


Venlafaxine HCl ER [Effexor Xr 75 mg Cap.sr] 75 mg PO DAILY 01/11/19 


Enoxaparin Sodium [Lovenox Inj 40 mg/0.4 ml Disp.syrin] 40 mg SUBCUT DAILY #99 

disp.syrin 01/16/19 


Oxycodone HCl [Oxy-Ir 5 mg Tablet] 5 mg PO Q6HP PRN #60 tablet 01/16/19 


Venlafaxine HCl ER [Effexor Xr 75 mg Cap.sr] 75 mg PO DAILY  cap.sr.24h 01/16/19













History of Present Illness


Admission Date/PCP: 


  01/11/19 14:02





  DENICE PANDEY MD





History of Present Illness: 


MARÍA SWAN is a 87 year old male,He came  to the emergency room for 

evaluation of pain of the left hip joint ,he fell off the chair  about 2 days 

ago there was no antecedent history of chest pain, no loss of consciousness, no 

shortness of breath he was not dizzy, in the emergency room he was evaluated, x-

ray of the left hip joint was obtained it demonstrated intertrochanteric 

fracture of the left hip.  Based on the revised cardiac risk index score, he has

no history of CVA, he has no history of CHF, he has no history of CAD, he has no

history of CKD with a serum creatinine of more than 2 he has no history of Di

abetes mellitus requiring insulin the prospective procedure is intermediate risk

for complications based on all these factors he has a very low cardioVascular 

risk in the perioperative period , Patient can proceed to surgery.








Hospital Course


Hospital Course: 





Patient was admitted for the management of left hip fracture, UTI due to 

Proteus, he was seen by orthopedic he underwent ORIF, the plan is  to transfer 

the nursing home for rehabilitation





Physical Exam


Vital Signs: 


                                        











Temp Pulse Resp BP Pulse Ox


 


 98.4 F   72   17   146/56 H  96 


 


 01/16/19 12:00  01/16/19 12:00  01/16/19 12:00  01/16/19 12:00  01/16/19 12:00








                                 Intake & Output











 01/15/19 01/16/19 01/17/19





 06:59 06:59 06:59


 


Intake Total 2596 1495 318


 


Output Total 450  


 


Balance 2146 1495 318


 


Weight 99.4 kg 87.7 kg 











General appearance: PRESENT: no acute distress


Head exam: PRESENT: atraumatic


Eye exam: PRESENT: PERRLA


Ear exam: PRESENT: normal external ear exam


Respiratory exam: PRESENT: clear to auscultation beatrice


Cardiovascular exam: PRESENT: RRR, +S1, +S2


Pulses: PRESENT: normal dorsalis pedis pul


Vascular exam: PRESENT: normal capillary refill


GI/Abdominal exam: PRESENT: normal bowel sounds, soft


Rectal exam: PRESENT: deferred


Extremities exam: PRESENT: full ROM


Neurological exam: PRESENT: alert, awake, oriented to person, oriented to place,

oriented to time, oriented to situation, CN II-XII grossly intact


Psychiatric exam: PRESENT: appropriate affect, normal mood


Skin exam: PRESENT: dry, intact, warm





Results


Laboratory Results: 


                                        





                                 01/16/19 04:26 





                                 01/16/19 04:26 





                                        











  01/16/19 01/16/19





  04:26 04:26


 


WBC  9.1 


 


RBC  3.10 L 


 


Hgb  9.9 L 


 


Hct  28.1 L 


 


MCV  91 


 


MCH  32.0 


 


MCHC  35.2 


 


RDW  13.5 


 


Plt Count  152 


 


Sodium   141.4


 


Potassium   3.0 L*


 


Chloride   111 H


 


Carbon Dioxide   25


 


Anion Gap   5


 


BUN   14


 


Creatinine   0.91


 


Est GFR ( Amer)   > 60


 


Est GFR (Non-Af Amer)   > 60


 


Glucose   93


 


Calcium   7.4 L








                                        











  01/11/19 01/11/19 01/11/19





  18:42 18:42 18:42


 


Creatine Kinase   132 


 


CK-MB (CK-2)    3.44


 


Troponin I    0.020


 


NT-Pro-B Natriuret Pep  1710 H  














  01/12/19 01/12/19 01/12/19





  00:48 00:48 06:20


 


Creatine Kinase  102   83


 


CK-MB (CK-2)   2.95 


 


Troponin I   0.015 


 


NT-Pro-B Natriuret Pep   














  01/12/19





  06:20


 


Creatine Kinase 


 


CK-MB (CK-2)  2.36


 


Troponin I  < 0.012


 


NT-Pro-B Natriuret Pep 











Impressions: 


                                        





Chest X-Ray  01/11/19 00:00


IMPRESSION: 


 


No evidence of acute cardiopulmonary disease.


 


 


 








Fluoroscopy  01/14/19 00:00


IMPRESSION:  IMAGE(S) OBTAINED DURING PROCEDURE.


 








Hip X-Ray  01/14/19 00:00


IMPRESSION:  IMAGE(S) OBTAINED DURING PROCEDURE.


 














Qualifiers





- *


PATIENT BEING DISCHARGED WITH ANY OF THE FOLLOWING DIAGNOSIS: No

## 2019-01-16 NOTE — PDOC TRANSFER SUMMARY
General





- Admit/Disc Date/PCP


Admission Date/Primary Care Provider: 


  01/11/19 14:02





  DENICE PANDEY MD





Discharge Date: 01/16/19





- Discharge Diagnosis


(1) Acute blood loss anemia


Is this a current diagnosis for this admission?: Yes   


Summary: 


Posttransfusion hematocrit to 28%








(2) Fracture, intertrochanteric, left femur


Is this a current diagnosis for this admission?: Yes   


Summary: 


Status post open reduction internal fixation








(3) Hypokalemia


Is this a current diagnosis for this admission?: Yes   


Summary: 


Potassium this morning decreased to 3.0.  Oral supplementation.








- Additional Information


Resuscitation Status: Full Code


Home Medications: 








Aspirin [Ecotrin 81 mg EC Tablet] 81 mg PO DAILY 01/11/19 


Bupropion HCl [Bupropion HCl Sr] 150 mg PO BID 01/11/19 


Diltiazem HCl [Cardizem Cd 240 mg Capsule.cr] 240 mg PO DAILY 01/11/19 


Metoprolol Succinate [Toprol Xl 25 mg Tab.sr] 25 mg PO Q12 01/11/19 


Multivitamin [Daily Multiple Vitamin] 1 tab PO DAILY 01/11/19 


Simvastatin [Zocor 10 mg Tablet] 10 mg PO QHS 01/11/19 


Tamsulosin HCl [Flomax 0.4 mg Cap.sr] 0.4 mg PO PCSUPPER 01/11/19 


Venlafaxine HCl ER [Effexor Xr 75 mg Cap.sr] 75 mg PO DAILY 01/11/19 











History of Present Illness


Admission Date/PCP: 


  01/11/19 14:02





  DENICE PANDEY MD





History of Present Illness: 


MARÍA SWAN is a 87 year old male


Patient is an 87-year-old white male who fell at home and sustained a left 

intratrochanteric femur fracture.  He was brought to the emergency room and 

admitted to the primary care physician service.  Orthopedics consulted for 

fracture management.





Hospital Course


Hospital Course: 





Patient is taken to the operating room and undergoes uncomplicated open 

reduction internal fixation of a left intratrochanteric femur fracture.  Is 

returned to floor in satisfactory condition.  Makes limited progress with 

physical therapy.





Physical Exam


Vital Signs: 


                                        











Temp Pulse Resp BP Pulse Ox


 


 37.3 C   66   19   164/70 H  97 


 


 01/15/19 23:39  01/15/19 23:39  01/15/19 20:07  01/15/19 23:39  01/15/19 23:39








                                 Intake & Output











 01/15/19 01/16/19 01/17/19





 06:59 06:59 06:59


 


Intake Total 2596 1495 


 


Output Total 450  


 


Balance 2146 1495 


 


Weight 99.4 kg 87.7 kg 














Results


Laboratory Results: 


                                        





                                 01/16/19 04:26 





                                 01/16/19 04:26 





                                        











  01/13/19 01/16/19 01/16/19





  08:33 04:26 04:26


 


WBC   9.1 


 


RBC   3.10 L 


 


Hgb   9.9 L 


 


Hct   28.1 L 


 


MCV   91 


 


MCH   32.0 


 


MCHC   35.2 


 


RDW   13.5 


 


Plt Count   152 


 


Sodium    141.4


 


Potassium    3.0 L*


 


Chloride    111 H


 


Carbon Dioxide    25


 


Anion Gap    5


 


BUN    14


 


Creatinine    0.91


 


Est GFR ( Amer)    > 60


 


Est GFR (Non-Af Amer)    > 60


 


Glucose    93


 


Calcium    7.4 L


 


Blood Type  O POSITIVE  


 


Antibody Screen  NEGATIVE  








                                        











  01/11/19 01/11/19 01/11/19





  18:42 18:42 18:42


 


Creatine Kinase   132 


 


CK-MB (CK-2)    3.44


 


Troponin I    0.020


 


NT-Pro-B Natriuret Pep  1710 H  














  01/12/19 01/12/19 01/12/19





  00:48 00:48 06:20


 


Creatine Kinase  102   83


 


CK-MB (CK-2)   2.95 


 


Troponin I   0.015 


 


NT-Pro-B Natriuret Pep   














  01/12/19





  06:20


 


Creatine Kinase 


 


CK-MB (CK-2)  2.36


 


Troponin I  < 0.012


 


NT-Pro-B Natriuret Pep 











Impressions: 


                                        





Chest X-Ray  01/11/19 00:00


IMPRESSION: 


 


No evidence of acute cardiopulmonary disease.


 


 


 








Fluoroscopy  01/14/19 00:00


IMPRESSION:  IMAGE(S) OBTAINED DURING PROCEDURE.


 








Hip X-Ray  01/14/19 00:00


IMPRESSION:  IMAGE(S) OBTAINED DURING PROCEDURE.


 











Status: Imported from PACS





Transfer Plan





- Disposition


Transfer Plan: 





Patient be transferred to a skilled nursing facility when bed available.  

Patient can have dressing changes as needed to the left lower extremity.  

Physical therapy can work on weightbearing as tolerated ambulation.  Follow-up 

with Dr. Avila in 2 weeks for staple removal.





- Time Spent with Patient


Time spent with patient: Less than 30 Minutes





Qualifiers





- *


PATIENT BEING DISCHARGED WITH ANY OF THE FOLLOWING DIAGNOSIS: No


VTE patient discharged on overlapping Therapy?: Yes

## 2019-06-07 NOTE — RADIOLOGY REPORT (SQ)
EXAM DESCRIPTION:  CHEST SINGLE VIEW



COMPLETED DATE/TIME:  6/7/2019 3:36 pm



REASON FOR STUDY:  near syncope



COMPARISON:  1/11/2019



NUMBER OF VIEWS:  One view.



TECHNIQUE:  Single frontal radiographic view of the chest acquired.



LIMITATIONS:  None.



FINDINGS:  LUNGS AND PLEURA: No opacities, masses or pneumothorax. No pleural effusion.

MEDIASTINUM AND HILAR STRUCTURES: There is persistent fullness in the upper mediastinum this may repr
esent tortuous great vessels.  Upper mediastinal mass cannot be excluded.

HEART AND VASCULAR STRUCTURES: Heart normal in size.  Normal vasculature.

BONES: No acute findings.

HARDWARE: None in the chest.

OTHER: No other significant finding.



IMPRESSION:  Prominence the upper mediastinum.  This has progressed when compared to 2013.  No signif
icant interval change from January of this year.



TECHNICAL DOCUMENTATION:  JOB ID:  [id number]

 2011 [employer]- All Rights Reserved



Reading location - IP/workstation name: [email or web address]

## 2019-06-07 NOTE — EKG REPORT
SEVERITY:- BORDERLINE ECG -

BRADYCARDIA WITH IRREGULAR RATE 40-51

BORDERLINE T ABNORMALITIES, DIFFUSE LEADS

:

Confirmed by: Walter Romo MD 07-Jun-2019 22:18:58

## 2019-06-07 NOTE — ER DOCUMENT REPORT
ED General





- General


Chief Complaint: Near Syncope


Stated Complaint: SYNCOPE


Time Seen by Provider: 06/07/19 15:11


Primary Care Provider: 


DENICE PANDEY MD [Primary Care Provider] - Follow up as needed


Notes: 





Patient is a 88-year-old male with history of atrial fibrillation and 

hypertension that presents to the emergency department for chief complaint of 

near syncopal episode, shortly after a bowel movement.  Patient states that he 

apparently had almost passed out, but does not remember all these events, his 

wife apparently thought it looked like he was going to pass out.  He states that

he is feeling well at this time, denies any complaints.  Denies any recent chest

pain, shortness of breath, nausea, vomiting.  Denies any lightheadedness or 

dizziness at this time.  No complaints of pain.  He reports being on several 

blood pressure medications.  Per EMS the patient was hypotensive with a systolic

pressure in the 80s upon arrival, they gave him a small fluid bolus in route to 

the hospital.





Past Medical History: Atrial fibrillation, hypertension, BPH


Past Surgical History: Back surgery, left hip ORIF


Social History: Denies current tobacco, alcohol or drug use, lives at home, gets

around using wheelchair.


Family History: Reviewed and noncontributory for presenting illness


Allergies: Reviewed, see documented allergy list. 





REVIEW OF SYSTEMS:


Other than noted above, the 12 point review of systems was reviewed with the 

patient and were negative, all pertinent findings are included in the HPI.





PHYSICAL EXAMINATION:





Vital signs reviewed, nursing noted reviewed. 





GENERAL: Elderly male, no acute distress





HEAD: Atraumatic, normocephalic.





EYES: Eyes appear normal, extraocular movements intact, sclera anicteric, 

conjunctiva are normal.





ENT: nares patent, oropharynx clear without exudates.  Moist mucous membranes.





NECK: Normal range of motion, supple without lymphadenopathy





LUNGS: Breath sounds clear to auscultation bilaterally and equal.  No wheezes 

rales or rhonchi.





HEART: Heart rate bradycardic, regular rhythm, no audible murmur





ABDOMEN: Soft, nontender, normoactive bowel sounds.  No rebound, guarding, or 

rigidity. No masses appreciated.





EXTREMITIES: Nontender, decreased range of motion of the left hip, chronic per 

patient after her left hip surgery, 2+ pitting edema to the proximal tibias 

bilaterally, chronic per patient.





NEUROLOGICAL: No focal neurological deficits. Moves all extremities 

spontaneously Motor and sensory grossly intact on exam.





PSYCH: Normal mood, normal affect.





SKIN: Warm, Dry, normal turgor, no rashes or lesions noted on exposed skin





TRAVEL OUTSIDE OF THE [place] IN LAST 30 DAYS: No





- Related Data


Allergies/Adverse Reactions: 


                                        





No Known Allergies Allergy (Verified 01/11/19 20:45)


   











Past Medical History





- Social History


Smoking Status: Former Smoker


Family History: Reviewed & Not Pertinent





- Past Medical History


Cardiac Medical History: Reports: Hx Atrial Fibrillation, Hx Hypertension


   Denies: Hx Coronary Artery Disease, Hx Heart Attack


Pulmonary Medical History: 


   Denies: Hx Asthma, Hx Bronchitis, Hx COPD, Hx Pneumonia


Neurological Medical History: Denies: Hx Cerebrovascular Accident, Hx Seizures


Endocrine Medical History: Reports: Hx Diabetes Mellitus Type 2


Renal/ Medical History: Denies: Hx Peritoneal Dialysis


Musculoskeletal Medical History: Denies Hx Arthritis


Psychiatric Medical History: Reports: Hx Depression





- Immunizations


Hx Diphtheria, Pertussis, Tetanus Vaccination: No - unsure 





Physical Exam





- Vital signs


Vitals: 


                                        











Temp Pulse Resp BP Pulse Ox


 


 97.9 F   50 L  13   138/89 H  96 


 


 06/07/19 15:05  06/07/19 15:05  06/07/19 15:05  06/07/19 15:05  06/07/19 15:05














Course





- Re-evaluation


Re-evalutation: 





Patient seen and examined vital signs reviewed. 





Laboratory data and/or imaging were ordered as appropriate for the patient's 

presenting symptoms and complaint, with consideration of any critical or life 

threatening conditions that may be associated with their obtained history and 

exam as noted above.





Patient was treated with IV magnesium, and IV potassium chloride, 40 mEq, and 

p.o. potassium chloride 40 mEq, as his potassium was noted to be 2.7





The patient was re-evaluated and was stable, no acute complaints, was overall 

feeling well.  I did discuss the case with the patient's primary care physician,

Dr. Pandey, who agreed with plan of care to replace potassium, and I 

discussed with him holding his Cardizem, which he agreed with as well, and to 

follow-up in the office next week of which the patient was agreeable to.





Evaluation was most consistent with symptomatic bradycardia, hypokalemia





Results were discussed with the patient at this point, after careful cons

ideration I feel that that patient can be discharged from the emergency 

department, the patient was educated treatments and reasons to return to the 

emergency department based on their presumed diagnosis as noted above, they were

advised to followup with a primary care physician in 2-3 days. Patient was 

agreeable to plan of care.





*Note is created using voice recognition software and may contain spelling, 

syntax or grammatical errors.








Laboratory











  06/07/19 06/07/19 06/07/19





  15:00 15:00 15:00


 


WBC  9.3  


 


RBC  3.59 L  


 


Hgb  10.9 L  


 


Hct  32.2 L  


 


MCV  90  


 


MCH  30.3  


 


MCHC  33.7  


 


RDW  14.6 H  


 


Plt Count  211  


 


Seg Neutrophils %  71.6  


 


Lymphocytes %  16.4  


 


Monocytes %  8.0  


 


Eosinophils %  3.1  


 


Basophils %  0.9  


 


Absolute Neutrophils  6.7  


 


Absolute Lymphocytes  1.5  


 


Absolute Monocytes  0.7  


 


Absolute Eosinophils  0.3  


 


Absolute Basophils  0.1  


 


Sodium   145.1 H 


 


Potassium   2.7 L* 


 


Chloride   109 H 


 


Carbon Dioxide   28 


 


Anion Gap   8 


 


BUN   12 


 


Creatinine   1.04 


 


Est GFR ( Amer)   > 60 


 


Est GFR (Non-Af Amer)   > 60 


 


Glucose   132 H 


 


Calcium   7.1 L 


 


Total Bilirubin   0.2 


 


Direct Bilirubin   0.2 


 


Neonat Total Bilirubin   Not Reportable 


 


Neonat Direct Bilirubin   Not Reportable 


 


Neonat Indirect Bili   Not Reportable 


 


AST   20 


 


ALT   22 


 


Alkaline Phosphatase   72 


 


Creatine Kinase   Cancelled 


 


CK-MB (CK-2)    Cancelled


 


Troponin I    < 0.012


 


Total Protein   5.3 L 


 


Albumin   2.7 L 











                                        





Chest X-Ray  06/07/19 15:12


IMPRESSION:  Prominence the upper mediastinum.  This has progressed when 

compared to 2013.  No significant interval change from January of this year.


 














- Vital Signs


Vital signs: 


                                        











Temp Pulse Resp BP Pulse Ox


 


 97.9 F   50 L  17   157/69 H  95 


 


 06/07/19 15:05  06/07/19 15:05  06/07/19 16:01  06/07/19 16:01  06/07/19 16:01














- Laboratory


Result Diagrams: 


                                 06/07/19 15:00





                                 06/07/19 15:00


Laboratory results interpreted by me: 


                                        











  06/07/19 06/07/19





  15:00 15:00


 


RBC  3.59 L 


 


Hgb  10.9 L 


 


Hct  32.2 L 


 


RDW  14.6 H 


 


Sodium   145.1 H


 


Potassium   2.7 L*


 


Chloride   109 H


 


Glucose   132 H


 


Calcium   7.1 L


 


Total Protein   5.3 L


 


Albumin   2.7 L














- EKG Interpretation by Me


Additional EKG results interpreted by me: 





EKG demonstrates atrial fibrillation with a ventricular rate of 48 bpm, normal 

axis,  ms, T wave flattening in leads aVF, V4 through V6, this is 

compared with a prior EKG from 1/11/2019, without significant change aside from 

decreased rate.








Critical Care Note





- Critical Care Note


Total time excluding time spent on procedures (mins): 35


Comments: 





Critical care time 35 minutes exclusive from separate billable procedures for a 

patient requiring complex medical decision making, and high potential for 

clinical deterioration.  In a patient with severe hyperkalemia requiring IV 

replacement, and close monitoring. Time spent obtaining history from patient or 

surrogate, discussions with consultants, development of treatment plan with 

patient or surrogate, evaluation of patient's response to treatment, examination

of patient, ordering and performing treatments and interventions, ordering and 

review of laboratory studies, re-evaluation of patient's condition, ordering and

review of radiographic studies and review of old charts





Discharge





- Discharge


Clinical Impression: 


 Near syncope, Bradycardia, Hypokalemia





Condition: Stable


Disposition: HOME, SELF-CARE


Instructions:  Near Syncopal Episode ([facility])


Additional Instructions: 


Please discontinue taking your diltiazem XL, 240 mg tablet, until further 

instructed by your primary care physician, Dr. Pandey.





If you develop any worsening of your symptoms, do not hesitate to return to the 

emergency department.


Referrals: 


DENICE PANDEY MD [Primary Care Provider] - Follow up in 3-5 days

## 2020-07-27 NOTE — RADIOLOGY REPORT (SQ)
EXAM DESCRIPTION:  U/S RETROPERITON (RENAL/AORTA)



IMAGES COMPLETED DATE/TIME:  7/27/2020 4:08 pm



REASON FOR STUDY:  R94.4 ABNORMAL RESULTS OF KIDNEY FUNCTION STUDIES R94.4  ABNORMAL RESULTS OF KIDNE
Y FUNCTION STUDIES



COMPARISON:  None.



TECHNIQUE:  Dynamic and static grayscale images acquired of the kidneys and bladder and recorded on P
ACS. Additional selected color Doppler and spectral images recorded.



LIMITATIONS:  None.



FINDINGS:  RIGHT KIDNEY:  The right kidney measures 8.7 cm in length.   Normal echogenicity.   No sol
id or suspicious masses.   No hydronephrosis.   No calcifications.

LEFT KIDNEY:  The left kidney measures 10.3 cm in length.   Normal echogenicity.   No solid or suspic
ious masses.   No hydronephrosis.   No calcifications.

BLADDER: No masses.

OTHER FINDINGS: No other significant finding.



IMPRESSION:  NORMAL RENAL AND BLADDER ULTRASOUND.



TECHNICAL DOCUMENTATION:  JOB ID:  [id number]

 2011 [employer]- All Rights Reserved



Reading location - IP/workstation name: [email or web address]

## 2020-08-06 NOTE — ER DOCUMENT REPORT
ED Medical Screen (RME)





- General


Chief Complaint: Direct Admit/Private MD


Stated Complaint: DIRECT ADMIT


Primary Care Provider: 


DENICE PANDEY MD [Primary Care Provider] - Follow up as needed


Notes: 





Patient is an 89-year-old male sent here from his primary doctor Dr. Pandey 

for direct admission of CHF and lower extremity edema.  Patient is well-

appearing at this time.





I have treated and performed a rapid initial assessment of this patient.  A 

comprehensive ED assessment and evaluation of the patient, analysis of test 

results and completion of medical decision making process will be conducted by 

additional ED providers.





PHYSICAL EXAMINATION:





GENERAL: Well-appearing, well-nourished and in no acute distress.  A&Ox4.  

Answers questions appropriately.


TRAVEL OUTSIDE OF THE [place] IN LAST 30 DAYS: No





- Related Data


Allergies/Adverse Reactions: 


                                        





No Known Allergies Allergy (Verified 08/06/20 12:45)


   











Past Medical History





- Past Medical History


Cardiac Medical History: Reports: Hx Atrial Fibrillation, Hx Hypertension


   Denies: Hx Coronary Artery Disease, Hx Heart Attack


Pulmonary Medical History: 


   Denies: Hx Asthma, Hx Bronchitis, Hx COPD, Hx Pneumonia


Neurological Medical History: Denies: Hx Cerebrovascular Accident, Hx Seizures


Endocrine Medical History: Reports: Hx Diabetes Mellitus Type 2


Renal/ Medical History: Denies: Hx Peritoneal Dialysis


Musculoskeltal Medical History: Denies Hx Arthritis


Psychiatric Medical History: Reports: Hx Depression


Past Surgical History: Reports: Hx Orthopedic Surgery - back, R ankle, R hip





- Immunizations


Hx Diphtheria, Pertussis, Tetanus Vaccination: No - unsure 





Physical Exam





- Vital signs


Vitals: 


                                        











Temp Pulse Resp BP Pulse Ox


 


 97.6 F   59 L  20   145/63 H  98 


 


 08/06/20 12:36  08/06/20 12:36  08/06/20 12:36  08/06/20 12:36  08/06/20 12:36














Course





- Vital Signs


Vital signs: 


                                        











Temp Pulse Resp BP Pulse Ox


 


 97.6 F   59 L  20   145/63 H  98 


 


 08/06/20 12:36  08/06/20 12:36  08/06/20 12:36  08/06/20 12:36  08/06/20 12:36














Doctor's Discharge





- Discharge


Referrals: 


DENICE PANDEY MD [Primary Care Provider] - Follow up as needed

## 2020-08-06 NOTE — RADIOLOGY REPORT (SQ)
EXAM DESCRIPTION:  CHEST 2 VIEWS



IMAGES COMPLETED DATE/TIME:  8/6/2020 1:36 pm



REASON FOR STUDY:  er chf,cellulitis



COMPARISON:  6/7/2019, 1/11/2019, 9/19/2013



EXAM PARAMETERS:  NUMBER OF VIEWS: two views

TECHNIQUE: Digital Frontal and Lateral radiographic views of the chest acquired.

RADIATION DOSE: NA

LIMITATIONS: none



FINDINGS:  LUNGS AND PLEURA: No opacities, masses or pneumothorax. No pleural effusion.

MEDIASTINUM AND HILAR STRUCTURES: The appearance of a widened superior mediastinum is not significant
ly changed dating back to 9/19/2013.

HEART AND VASCULAR STRUCTURES: Heart normal size.  No evidence for failure.

BONES: No acute findings.

HARDWARE: None in the chest.

OTHER: No other significant finding.



IMPRESSION:  No evidence of acute cardiopulmonary abnormality.  The appearance of a widened superior 
mediastinum appears stable dating back to September 2013.



TECHNICAL DOCUMENTATION:  JOB ID:  [id number]

 2011 [employer]- All Rights Reserved



Reading location - IP/workstation name: [email or web address]

## 2020-08-06 NOTE — PDOC H&P
History of Present Illness


Admission Date/PCP: 


  08/06/20 17:09





  DENICE PANDEY MD





History of Present Illness: 


MARÍA SWAN is a 89 year old male, He came to the office for evaluation of 

bilateral leg swelling with redness of the skin, he denies any shortness of 

breath or chest pain.  Patient is sedentary, he ambulates in a wheelchair, it 

was difficult to completely rule out CHF, nephrotic syndrome, I felt patient 

needed to be admitted into the hospital for further evaluation especially this 

particular patient very elderly, sedentary.The B type natruretic peptide was 

elevated, but today she was normal, the transthoracic echocardiogram, a very 

poor study, showed preserved ejection fraction of left ventricle difficult to 

evaluate for diastolic function, the venous Doppler of the legs negative for 

DVT.The urine protein creatinine ratio 0.4 this essentially rule out nephrotic s

yndrome, patient was admitted treated with intravenous diuretic.  Initially the 

intent was to admit inpatient but after evaluation it was felt that patient only

meets  observation








Past Medical History


Cardiac Medical History: Reports: Hypertension


Endocrine Medical History: Reports: Diabetes Mellitus Type 2


Musculoskeltal Medical History: 


   Denies: Arthritis


Psychiatric Medical History: Reports: Depression





Past Surgical History


Past Surgical History: Reports: Orthopedic Surgery - back, R ankle, R hip





Social History


Smoking Status: Former Smoker


Electronic Cigarette use?: No


Frequency of Alcohol Use: None


Hx Recreational Drug Use: No


Drugs: None


Hx Prescription Drug Abuse: No





Family History


Family History: Reviewed & Not Pertinent


Parental Family History Reviewed: Yes


Children Family History Reviewed: Yes


Sibling(s) Family History Reviewed.: Yes





Medication/Allergy


Home Medications: 








Bupropion HCl [Bupropion HCl Sr] 150 mg PO BID 01/11/19 


Tamsulosin HCl [Flomax 0.4 mg Cap.sr] 0.4 mg PO PCSUPPER 01/11/19 


Venlafaxine HCl ER [Effexor Xr 75 mg Cap.sr] 75 mg PO DAILY  cap.sr.24h 01/16/19




Amlodipine/Valsartan/Hcthiazid [Exforge Hct -25 mg Tab] 1 each PO DAILY 

08/06/20 


Cholecalciferol (Vitamin D3) [Vitamin D3 400 Unit Tablet] 400 unit PO DAILY 

08/06/20 


Metoprolol Succinate [Toprol Xl] 100 mg PO DAILY 08/06/20 


Potassium Chloride 20 meq PO DAILY 08/06/20 


Simvastatin [Zocor 10 mg Tablet] 10 mg PO QHS 08/06/20 








Allergies/Adverse Reactions: 


                                        





No Known Allergies Allergy (Verified 08/06/20 12:45)


   











Review of Systems


Constitutional: ABSENT: chills, fever(s), headache(s), weight gain, weight loss


Eyes: ABSENT: visual disturbances


Ears: ABSENT: hearing changes


Cardiovascular: PRESENT: edema.  ABSENT: chest pain, dyspnea on exertion, 

orthropnea, palpitations


Respiratory: ABSENT: cough, hemoptysis


Gastrointestinal: ABSENT: abdominal pain, constipation, diarrhea, hematemesis, 

hematochezia, nausea, vomiting


Genitourinary: ABSENT: dysuria, hematuria


Musculoskeletal: ABSENT: joint swelling


Integumentary: ABSENT: rash, wounds


Neurological: ABSENT: abnormal gait, abnormal speech, confusion, dizziness, f

ocal weakness, syncope


Psychiatric: ABSENT: anxiety, depression, homidical ideation, suicidal ideation


Endocrine: ABSENT: cold intolerance, heat intolerance, menstrual abnormalities, 

polydipsia, polyuria


Hematologic/Lymphatic: ABSENT: easy bleeding, easy bruising, lymphadenopathy





Physical Exam


Vital Signs: 


                                        











Temp Pulse Resp BP Pulse Ox


 


 97.9 F   59 L  17   128/73 H  98 


 


 08/06/20 16:16  08/06/20 12:36  08/06/20 18:01  08/06/20 18:01  08/06/20 18:01








                                 Intake & Output











 08/05/20 08/06/20 08/07/20





 06:59 06:59 06:59


 


Intake Total   50


 


Balance   50


 


Weight   78.7 kg











General appearance: PRESENT: no acute distress


Head exam: PRESENT: atraumatic, normocephalic


Eye exam: PRESENT: PERRLA


Ear exam: PRESENT: normal external ear exam


Mouth exam: PRESENT: moist, tongue midline


Neck exam: PRESENT: full ROM


Respiratory exam: PRESENT: clear to auscultation beatrice


Cardiovascular exam: PRESENT: RRR, +S1, +S2


Pulses: PRESENT: normal dorsalis pedis pul, +2 pedal pulses bilateral


Vascular exam: PRESENT: normal capillary refill


GI/Abdominal exam: PRESENT: normal bowel sounds, soft


Rectal exam: PRESENT: deferred


Extremities exam: PRESENT: pedal edema


Neurological exam: PRESENT: alert, CN II-XII grossly intact


Psychiatric exam: PRESENT: appropriate affect, normal mood


Skin exam: PRESENT: dry, intact, warm





Results


Laboratory Results: 


                                        





                                 08/06/20 13:11 





                                 08/06/20 13:11 





                                        











  08/06/20 08/06/20 08/06/20





  13:11 13:11 13:11


 


WBC  8.1  


 


RBC  3.82 L  


 


Hgb  12.1 L  


 


Hct  36.1 L  


 


MCV  94  


 


MCH  31.7  


 


MCHC  33.6  


 


RDW  13.6  


 


Plt Count  183  


 


Seg Neutrophils %  62.4  


 


Sodium   143.3 


 


Potassium   4.0 


 


Chloride   114 H 


 


Carbon Dioxide   23 


 


Anion Gap   6 


 


BUN   19 


 


Creatinine   1.28 H 


 


Est GFR ( Amer)   > 60 


 


Glucose   107 


 


Calcium   7.9 L 


 


Total Bilirubin   0.3 


 


AST   31 


 


Alkaline Phosphatase   94 


 


Total Protein   7.3 


 


Albumin   4.0 


 


TSH    1.21


 


Free T4    0.92


 


Urine Color   


 


Urine Appearance   


 


Urine pH   


 


Ur Specific Gravity   


 


Urine Protein   


 


Urine Glucose (UA)   


 


Urine Ketones   


 


Urine Blood   


 


Urine Nitrite   


 


Ur Leukocyte Esterase   


 


Urine WBC (Auto)   


 


Urine RBC (Auto)   














  08/06/20





  13:11


 


WBC 


 


RBC 


 


Hgb 


 


Hct 


 


MCV 


 


MCH 


 


MCHC 


 


RDW 


 


Plt Count 


 


Seg Neutrophils % 


 


Sodium 


 


Potassium 


 


Chloride 


 


Carbon Dioxide 


 


Anion Gap 


 


BUN 


 


Creatinine 


 


Est GFR (African Amer) 


 


Glucose 


 


Calcium 


 


Total Bilirubin 


 


AST 


 


Alkaline Phosphatase 


 


Total Protein 


 


Albumin 


 


TSH 


 


Free T4 


 


Urine Color  YELLOW


 


Urine Appearance  SLIGHTLY-CLOUDY


 


Urine pH  5.0


 


Ur Specific Gravity  1.013


 


Urine Protein  NEGATIVE


 


Urine Glucose (UA)  NEGATIVE


 


Urine Ketones  NEGATIVE


 


Urine Blood  NEGATIVE


 


Urine Nitrite  POSITIVE H


 


Ur Leukocyte Esterase  NEGATIVE


 


Urine WBC (Auto)  5


 


Urine RBC (Auto)  1








                                        











  08/06/20





  13:11


 


NT-Pro-B Natriuret Pep  1150 H











Impressions: 


                                        





Chest X-Ray  08/06/20 00:00


IMPRESSION:  No evidence of acute cardiopulmonary abnormality.  The appearance 

of a widened superior mediastinum appears stable dating back to September 2013.


 














Assessment & Plan





- Diagnosis


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

r extremity


Is this a current diagnosis for this admission?: Yes   


Plan: 


The differential diagnosis include with valvular insufficiency of the lower 

extremity veins, no evidence of systolic heart failure, no nephrotic syndrome, 

patient was treated with diuretic, IV furosemide with good result advised to use

compression stocking the redness is probably due to venous stasis dermatitis








(2) T2DM (type 2 diabetes mellitus)


Qualifiers: 


   Diabetes mellitus long term insulin use: without long term use   Diabetes 

mellitus complication status: without complication   Qualified Code(s): E11.9 - 

Type 2 diabetes mellitus without complications   


Is this a current diagnosis for this admission?: Yes   


Plan: 


Patient not presently requiring medication for control of diabetes, the 

hemoglobin A1c has been persistently below 5 for many months, he was taken off 

medication, presently Diet-controlled








- Time


Time Spent: Greater than 70 Minutes


Medications reviewed and adjusted accordingly: Yes


Anticipated Discharge Disposition: Home, Self Care


Anticipated Discharge Timeframe: within 24 hours

## 2020-08-07 NOTE — PDOC DISCHARGE SUMMARY
Impression





- Admit/DC Date/PCP


Admission Date/Primary Care Provider: 


  08/06/20 17:09





  DENICE PANDEY MD





Discharge Date: 08/07/20





- Discharge Diagnosis


(1) Chronic venous hypertension (idiopathic) with inflammation of bilateral 

lower extremity


Is this a current diagnosis for this admission?: Yes   





(2) T2DM (type 2 diabetes mellitus)


Is this a current diagnosis for this admission?: Yes   





- Additional Information


Referrals: 


DENICE PANDEY MD [Primary Care Provider] - 08/13/20 1:30 pm


Home Medications: 








RX: Bupropion HCl [Bupropion HCl Sr] 150 mg PO BID 01/11/19 


RX: Tamsulosin HCl [Flomax 0.4 mg Cap.sr] 0.4 mg PO PCSUPPER 01/11/19 


RX: Venlafaxine HCl ER [Effexor Xr 75 mg Cap.sr] 75 mg PO DAILY  cap.sr.24h 

01/16/19 


RX: Amlodipine/Valsartan/Hcthiazid [Exforge Hct -25 mg Tab] 1 each PO 

DAILY 08/06/20 


RX: Cholecalciferol (Vitamin D3) [Vitamin D3 400 Unit Tablet] 400 unit PO DAILY 

08/06/20 


RX: Metoprolol Succinate [Toprol Xl] 100 mg PO DAILY 08/06/20 


RX: Potassium Chloride 20 meq PO DAILY 08/06/20 


RX: Simvastatin [Zocor 10 mg Tablet] 10 mg PO QHS 08/06/20 











History of Present Illiness


History of Present Illness: 


MARÍA SWAN is a 89 year old male, He came to the office for evaluation of 

bilateral leg swelling with redness of the skin, he denies any shortness of 

breath or chest pain.  Patient is sedentary, he ambulates in a wheelchair, it 

was difficult to completely rule out CHF, nephrotic syndrome, I felt patient 

needed to be admitted into the hospital for further evaluation especially this 

particular patient very elderly, sedentary.The B type natruretic peptide was 

elevated, but today she was normal, the transthoracic echocardiogram, a very 

poor study, showed preserved ejection fraction of left ventricle difficult to 

evaluate for diastolic function, the venous Doppler of the legs negative for 

DVT.The urine protein creatinine ratio 0.4 this essentially rule out nephrotic 

syndrome, patient was admitted treated with intravenous diuretic.  Initially the

intent was to admit inpatient but after evaluation it was felt that patient only

met  observation








Hospital Course


Hospital Course: 


Patient was admitted for the management of edema of lower extremity he was 

treated with furosemide infusion with good result, CHF was ruled out, nephrotic 

syndrome was ruled out.  He had redness of the skin initially cellulitis was 

suspected, he was empirically treated with IV antibiotic clindamycin ultimately 

it was felt that erythema of the skin is most likely from venous stasis





Physical Exam


Vital Signs: 


                                        











Temp Pulse Resp BP Pulse Ox


 


 98.5 F   63   17   128/73 H  95 


 


 08/07/20 13:46  08/07/20 13:46  08/07/20 13:46  08/07/20 13:46  08/07/20 13:46








                                 Intake & Output











 08/06/20 08/07/20 08/08/20





 06:59 06:59 06:59


 


Intake Total  150 472


 


Output Total  825 1000


 


Balance  -675 -528


 


Weight  79 kg 











General appearance: PRESENT: no acute distress


Eye exam: PRESENT: PERRLA


Respiratory exam: PRESENT: clear to auscultation beatrice


Cardiovascular exam: PRESENT: +S1, +S2


GI/Abdominal exam: PRESENT: soft


Neurological exam: PRESENT: alert





Results


Laboratory Results: 


                                        











WBC  8.1 10^3/uL (4.0-10.5)   08/06/20  13:11    


 


RBC  3.82 10^6/uL (4.35-5.55)  L  08/06/20  13:11    


 


Hgb  12.1 g/dL (13.5-17.0)  L  08/06/20  13:11    


 


Hct  36.1 % (37.9-51.0)  L  08/06/20  13:11    


 


MCV  94 fl (80-97)   08/06/20  13:11    


 


MCH  31.7 pg (27.0-33.4)   08/06/20  13:11    


 


MCHC  33.6 g/dL (32.0-36.0)   08/06/20  13:11    


 


RDW  13.6 % (11.5-14.0)   08/06/20  13:11    


 


Plt Count  183 10^3/uL (150-450)   08/06/20  13:11    


 


Lymph % (Auto)  22.3 % (13-45)   08/06/20  13:11    


 


Mono % (Auto)  8.3 % (3-13)   08/06/20  13:11    


 


Eos % (Auto)  5.9 % (0-6)   08/06/20  13:11    


 


Baso % (Auto)  1.1 % (0-2)   08/06/20  13:11    


 


Absolute Neuts (auto)  5.0 10^3/uL (1.7-8.2)   08/06/20  13:11    


 


Absolute Lymphs (auto)  1.8 10^3/uL (0.5-4.7)   08/06/20  13:11    


 


Absolute Monos (auto)  0.7 10^3/uL (0.1-1.4)   08/06/20  13:11    


 


Absolute Eos (auto)  0.5 10^3/uL (0.0-0.6)   08/06/20  13:11    


 


Absolute Basos (auto)  0.1 10^3/uL (0.0-0.2)   08/06/20  13:11    


 


Seg Neutrophils %  62.4 % (42-78)   08/06/20  13:11    


 


Sodium  143.3 mmol/L (137-145)   08/07/20  09:36    


 


Potassium  3.2 mmol/L (3.6-5.0)  L  08/07/20  09:36    


 


Chloride  110 mmol/L ()  H  08/07/20  09:36    


 


Carbon Dioxide  25 mmol/L (22-30)   08/07/20  09:36    


 


Anion Gap  8  (5-19)   08/07/20  09:36    


 


BUN  22 mg/dL (7-20)  H  08/07/20  09:36    


 


Creatinine  1.46 mg/dL (0.52-1.25)  H  08/07/20  09:36    


 


Est GFR ( Amer)  55  (>60)  L  08/07/20  09:36    


 


Est GFR (MDRD) Non-Af  45  (>60)  L  08/07/20  09:36    


 


Glucose  94 mg/dL ()   08/07/20  09:36    


 


POC Glucose  147 mg/dL ()  H  08/06/20  18:39    


 


Calcium  7.4 mg/dL (8.4-10.2)  L  08/07/20  09:36    


 


Total Bilirubin  0.4 mg/dL (0.2-1.3)   08/07/20  09:36    


 


Direct Bilirubin  0.1 mg/dL (0.0-0.4)   08/07/20  09:36    


 


Neonat Total Bilirubin  Not Reportable   08/07/20  09:36    


 


Neonat Direct Bilirubin  Not Reportable   08/07/20  09:36    


 


Neonat Indirect Bili  Not Reportable   08/07/20  09:36    


 


AST  25 U/L (17-59)   08/07/20  09:36    


 


ALT  28 U/L (<50)   08/07/20  09:36    


 


Alkaline Phosphatase  89 U/L ()   08/07/20  09:36    


 


NT-Pro-B Natriuret Pep  1150 pg/mL (<450)  H  08/06/20  13:11    


 


Total Protein  6.9 g/dL (6.3-8.2)   08/07/20  09:36    


 


Albumin  3.8 g/dL (3.5-5.0)   08/07/20  09:36    


 


TSH  1.21 uIU/mL (0.47-4.68)   08/06/20  13:11    


 


Free T4  0.92 ng/dL (0.78-2.19)   08/06/20  13:11    


 


Urine Color  YELLOW   08/06/20  13:11    


 


Urine Appearance  SLIGHTLY-CLOUDY   08/06/20  13:11    


 


Urine pH  5.0  (5.0-9.0)   08/06/20  13:11    


 


Ur Specific Gravity  1.013   08/06/20  13:11    


 


Urine Protein  NEGATIVE mg/dL (NEGATIVE)   08/06/20  13:11    


 


Urine Glucose (UA)  NEGATIVE mg/dL (NEGATIVE)   08/06/20  13:11    


 


Urine Ketones  NEGATIVE mg/dL (NEGATIVE)   08/06/20  13:11    


 


Urine Blood  NEGATIVE  (NEGATIVE)   08/06/20  13:11    


 


Urine Nitrite  POSITIVE  (NEGATIVE)  H  08/06/20  13:11    


 


Urine Bilirubin  NEGATIVE  (NEGATIVE)   08/06/20  13:11    


 


Urine Urobilinogen  NEGATIVE mg/dL (<2.0)   08/06/20  13:11    


 


Ur Leukocyte Esterase  NEGATIVE  (NEGATIVE)   08/06/20  13:11    


 


Urine WBC (Auto)  5 /HPF  08/06/20  13:11    


 


Urine RBC (Auto)  1 /HPF  08/06/20  13:11    


 


Squamous Epi Cells Auto  1 /HPF  08/06/20  13:11    


 


Urine Mucus (Auto)  RARE /LPF  08/06/20  13:11    


 


Urine Creatinine  60.7 mg/dL ()   08/06/20  13:11    


 


Urine Creatinine  60.7 mg/dL ()   08/06/20  13:11    


 


Protein/Creatinin Ratio  0.4 mg/mg (0.0-0.2)  H  08/06/20  13:11    


 


Urine Total Protein  24.1 mg/dL (<12)  H  08/06/20  13:11    


 


Urine Total Protein  24.1 mg/dL (<12)  H  08/06/20  13:11    


 


Urine Ascorbic Acid  NEGATIVE  (NEGATIVE)   08/06/20  13:11    








                                        











  08/06/20





  13:11


 


NT-Pro-B Natriuret Pep  1150 H











Impressions: 


                                        





Chest X-Ray  08/06/20 00:00


IMPRESSION:  No evidence of acute cardiopulmonary abnormality.  The appearance 

of a widened superior mediastinum appears stable dating back to September 2013.


 








Venous Doppler Study  08/07/20 00:00


IMPRESSION:  1. No evidence of DVT or SVT in either leg.


2.  Bilateral lower extremity subcutaneous edema.


 














Stroke


Is this a Stroke Patient?: No





Acute Heart Failure





- **


Is this a Heart Failure Patient?: No

## 2020-08-07 NOTE — RADIOLOGY REPORT (SQ)
EXAM DESCRIPTION:  VENOUS BILATERAL LOWER



IMAGES COMPLETED DATE/TIME:  8/7/2020 10:24 am



REASON FOR STUDY:  BLE EDEMA, suspect DVT



COMPARISON:  None.



TECHNIQUE:  Dynamic and static gray scale and color images acquired of both lower extremity venous sy
stems. Selected spectral images acquired with additional compression and augmentation maneuvers. Imag
es stored on PACS.



LIMITATIONS:  None.



FINDINGS:  RIGHT LEG

COMMON FEMORAL AND FEMORAL: Normal phasicity, compression and augmentation. No visualized echogenic m
aterial on gray scale. No defects on color images.

POPLITEAL: Normal compression and augmentation. No visualized echogenic material on gray scale. No de
fects on color images.

CALF VESSELS: Normal compression and augmentation. No visualized echogenic material on gray scale. No
 defects on color image.

GSV AND SSV: Normal compression. No visualized echogenic material on gray scale. No defects on color 
images.

ANY DEEP VENOUS INSUFFICIENCY: Not evaluated.

ANY EVIDENCE OF POPLITEAL CYST: No.

OTHER: There is right lower extremity subcutaneous edema.

LEFT LEG

COMMON FEMORAL AND FEMORAL: Normal phasicity, compression and augmentation. No visualized echogenic m
aterial on gray scale. No defects on color images.

POPLITEAL: Normal compression and augmentation. No visualized echogenic material on gray scale. No de
fects on color images.

CALF VESSELS: Normal compression and augmentation. No visualized echogenic material on gray scale. No
 defects on color images.

GSV AND SSV: Normal compression. No visualized echogenic material on gray scale. No defects on color 
images.

ANY DEEP VENOUS INSUFFICIENCY: Not evaluated.

ANY EVIDENCE POPLITEAL CYST: No.

OTHER: There is left lower extremity subcutaneous edema.



IMPRESSION:  1. No evidence of DVT or SVT in either leg.

2.  Bilateral lower extremity subcutaneous edema.



TECHNICAL DOCUMENTATION:  JOB ID:  [id number]

 2011 [employer]- All Rights Reserved



Reading location - IP/workstation name: [email or web address]

## 2020-08-07 NOTE — XCELERA REPORT
[address] [address]

                               Tel: [phone]

                               Fax: [phone]



                      Transthoracic Echocardiogram Report

_______________________________________________________________________________



Name: MARÍA SWAN

MRN: [id number]                           Age: 89 yrs

Gender: Male                              : 1931

Patient Status: Inpatient                 Patient Location: [place] [address]A

Account #: [id number]

Study Date: 2020 05:19 PM

Accession #: [id number]

_______________________________________________________________________________



Height: 67 in        Weight: 173 lb        BSA: 1.9 m2

_______________________________________________________________________________

Procedure: A two-dimensional transthoracic echocardiogram with color flow and

Doppler was performed. Study Quality: Poor.

Reason For Study: CHF / EDEMA



History: CHF / EDEMA.

Ordering Physician: DENICE PANDEY



Performed By: Yajaira Hernandes

_______________________________________________________________________________



Interpretation Summary

The left ventricle is normal in size.

There is normal left ventricular wall thickness.

Left ventricular systolic function is normal.

LV EF is 55% to 60%

Doppler measurements suggest impaired left ventricular relaxation, which is

associated with grade I/IV or mild diastolic dysfunction

There is no thrombus.

Cannot assess ASD ,VSD,or PFO.

The right ventricle is grossly normal size.

The right atrium is normal.

The left atrial size is normal.

There is no evidence of mitral valve prolapse.

There is no vegetation seen on the mitral valve.

There is no mitral valve stenosis.

There is a mild amount of mitral regurgitation

There is no aortic valvular vegetation.

There is no aortic valve stenosis

There is aortic sclerosis without aortic stenosis.

There is no LVOT obstruction.

No aortic regurgitation is present.

There is no tricuspid stenosis.

There is a trace amount of tricuspid regurgitation

Tricuspid regurgitation jet envelope not well defined to measure RV systolic

pressure accurately.

There is no pulmonic valvular stenosis.

There is no pulmonic valvular regurgitation.

The aortic root is not well visualized but is probably normal size.

The inferior vena cava appeared normal and decreased < 50% with respiration

(RAP 10-15 mmHg)

There is no pericardial effusion.



MMode/2D Measurements & Calculations

RVDd: 4.0 cm  LVIDd: 5.0 cm   FS: 29.4 %              Ao root diam: 2.7 cm



IVSd: 1.1 cm  LVIDs: 3.5 cm   EDV(Teich): 118.2 ml    Ao root area: 5.5 cm2

              LVPWd: 0.98 cm  ESV(Teich): 52.0 ml     LA dimension: 3.8 cm

                              EF(Teich): 56.1 %



Doppler Measurements & Calculations

MV E max grover:     MV P1/2t max grover:    Ao V2 max:         AI max grover:

51.4 cm/sec       67.5 cm/sec          159.0 cm/sec       142.7 cm/sec

MV A max grover:     MV P1/2t: 108.1 msec Ao max PG:         AI max P.1 mmHg

77.8 cm/sec       MVA(P1/2t): 2.0 cm2  10.1 mmHg          AI dec slope:

MV E/A: 0.66      MV dec slope:                           16.7 cm/sec2

                                                          AI P1/2t: 2502 msec

                  182.8 cm/sec2

                  MV dec time: 0.30 sec

        _______________________________________________________________

LV V1 max PG:     PA V2 max:           AV P1/2t-pr_phl:   MV P1/2t-pr_phl:

2.9 mmHg          87.8 cm/sec          2502 msec          108.1 msec

LV V1 max:        PA max PG: 3.1 mmHg

85.4 cm/sec





Left Ventricle

The left ventricle is normal in size. There is normal left ventricular wall

thickness. Left ventricular systolic function is normal. LV EF is 55% to 60%.

Doppler measurements suggest impaired left ventricular relaxation, which is

associated with grade I/IV or mild diastolic dysfunction. The left ventricular

wall motion is normal. There is no thrombus. Cannot assess ASD ,VSD,or PFO.



Right Ventricle

The right ventricle is grossly normal size. The right ventricle is not well

visualized secondary to technical limitations.



Atria

The right atrium is normal. The left atrial size is normal.



Mitral Valve

There is no evidence of mitral valve prolapse. There is no vegetation seen on

the mitral valve. There is no mitral valve stenosis. There is a mild amount of

mitral regurgitation.





Aortic Valve

There is no aortic valvular vegetation. There is no aortic valve stenosis.

There is aortic sclerosis without aortic stenosis. There is no LVOT

obstruction. No aortic regurgitation is present.



Tricuspid Valve

There is no tricuspid stenosis. There is a trace amount of tricuspid

regurgitation. Tricuspid regurgitation jet envelope not well defined to

measure RV systolic pressure accurately.



Pulmonic Valve

There is no pulmonic valvular stenosis. There is no pulmonic valvular

regurgitation.



Great Vessels

The aortic root is not well visualized but is probably normal size. The

inferior vena cava appeared normal and decreased < 50% with respiration (RAP

10-15 mmHg).



Effusions

There is no pericardial effusion.





_______________________________________________________________________________

_______________________________________________________________________________



Electronically signed by:      Brit Taveras      on 2020 09:32 PM



CC: DENICE PANDEY Lakshmi

## 2020-08-22 NOTE — RADIOLOGY REPORT (SQ)
EXAM DESCRIPTION:  CT HEAD WITHOUT



IMAGES COMPLETED DATE/TIME:  8/22/2020 1:07 pm



REASON FOR STUDY:  traumatic fall



COMPARISON:  CT head, 6/11/2017



TECHNIQUE:  Axial images acquired through the brain without intravenous contrast.  Images reviewed wi
th bone, brain and subdural windows.  Additional sagittal and coronal reconstructions were generated.
 Images stored on PACS.

All CT scanners at this facility use dose modulation, iterative reconstruction, and/or weight based d
osing when appropriate to reduce radiation dose to as low as reasonably achievable (ALARA).

[facility]: Dose Right  [facility]: CareDose    [facility]: Dose Right    [facility]: Teradose 4D    [facility]: Smart Signostics



RADIATION DOSE:  CT Rad equipment meets quality standard of care and radiation dose reduction techniq
ues were employed. CTDIvol: 53.2 mGy. DLP: 1928 mGy-cm. mGy.



LIMITATIONS:  None.



FINDINGS:  VENTRICLES: Normal size and contour.

CEREBRUM: No masses.  No hemorrhage.  No midline shift.  No evidence for acute infarction. Normal gra
y-white matter differentiation.  Mild patchy periventricular and deep white matter hypodense attenuat
ion consistent with mild chronic small vessel ischemic change.  There is intracranial atherosclerosis
.

CEREBELLUM: No masses.  No hemorrhage.  No alteration of density.  No evidence for acute infarction.

EXTRAAXIAL SPACES: No fluid collections.  No masses.

ORBITS AND GLOBE: No intra- or extraconal masses.  Normal contour of globe without masses.

CALVARIUM: No fracture.

PARANASAL SINUSES: No fluid or mucosal thickening.

SOFT TISSUES: No mass or hematoma.

OTHER: No other significant finding.



IMPRESSION:  No acute intracranial hemorrhage, mass, or evidence of acute territorial infarct.

EVIDENCE OF ACUTE STROKE: NO.



COMMENT:  Quality ID # 436: Final reports with documentation of one or more dose reduction techniques
 (e.g., Automated exposure control, adjustment of the mA and/or kV according to patient size, use of 
iterative reconstruction technique)



TECHNICAL DOCUMENTATION:  JOB ID:  [id number]

 2011 [email or web address]- All Rights Reserved



Reading location - IP/workstation name: [email or web address]

## 2020-08-22 NOTE — RADIOLOGY REPORT (SQ)
EXAM DESCRIPTION:  CT CHEST WITHOUT; CT ABD/PELVIS NO ORAL OR IV



IMAGES COMPLETED DATE/TIME:  8/22/2020 1:07 pm



REASON FOR STUDY:  traumatic fall



COMPARISON:  Pelvis radiograph, 1/11/2019.  Two-view chest radiograph, 8/6/2020.



TECHNIQUE:  CT scan of the chest performed without intravenous contrast using helical scanning techni
que.  Images reviewed with lung, soft tissue and bone windows. Reconstructed coronal and sagittal MPR
 images reviewed.  All images stored on PACS.

CT scan of the abdomen and pelvis performed without intravenous contrast and withoutoral contrast usi
ng helical scanning technique with dynamic intravenous contrast injection.  Images reviewed with lung
, soft tissue and bone windows.  Reconstructed coronal and sagittal MPR images reviewed.  All images 
stored on PACS.

All CT scanners at this facility use dose modulation, iterative reconstruction, and/or weight based d
osing when appropriate to reduce radiation dose to as low as reasonably achievable (ALARA).

[facility]: Dose Right  [facility]: CareDose    [facility]: Dose Right    [facility]: Teradose 4D    [facility]: Smart Technologies



RADIATION DOSE:  CT Rad equipment meets quality standard of care and radiation dose reduction techniq
ues were employed. CTDIvol: 14.4 mGy. DLP: 961 mGy-cm. mGy.



LIMITATIONS:  No technical limitations.



FINDINGS:  CHEST:

AXILLAE: No adenopathy.

CHEST WALL: No masses.  No subcutaneous air.

LUNGS: The trachea has normal caliber and appearance.  There are patchy areas of consolidation and gr
ound-glass attenuation in both lungs, with more nodular components in the left upper lobe with a grou
nd-glass nodule measuring 1.3 cm and a more focal nodule in the right upper lobe, sub solid nodule me
asuring 2.3 cm.  No pleural effusion or pneumothorax.

PLEURA: No effusions.  No calcifications.

THYROID: Thyromegaly with retrosternal component.  No focal thyroid nodule.

HILAR AND MEDIASTINAL STRUCTURES: Small hiatal hernia.  No mediastinal mass or adenopathy.

AORTA AND GREAT VESSELS: No aneurysm.

HEART: There is moderate cardiomegaly.  No pericardial effusion.  Calcified coronary arteries.

HARDWARE AND LIFELINES: None.

BONES: A nondisplaced fractures of the left anterior 8th, 9th, and 10th ribs.  No suspicious bone les
ions.  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  Osteoarthritis bilateral desiree
ulders.  Chronic healed right anterolateral 5th-7th ribs.

OTHER: No other significant finding.

ABDOMEN AND PELVIS:

LIVER: There is beam hardening artifact through the liver which obscures some detail.  Evaluation of 
the parenchyma is limited due to artifact and lack of IV contrast.  Normal size and contour.  No bili
anne ductal dilation.

SPLEEN: Pain has normal size.  Evaluation of the parenchyma is limited due to motion and beam hardeni
ng artifact.

PANCREAS: There appear to be multiple pancreatic calcifications within the parenchyma, consistent wit
h sequelae from chronic pancreatitis.  No peripancreatic inflammation.  No pancreatic ductal dilation
.

GALLBLADDER: No identified stones by CT criteria. No inflammatory changes to suggest cholecystitis.

ADRENAL GLANDS: No significant masses or asymmetry.

RIGHT KIDNEY AND URETER: No solid masses. Assessment limited by lack of IV contrast.   No significant
 calcifications.   No hydronephrosis or hydroureter.

LEFT KIDNEY AND URETER: No solid masses. Assessment limited by lack of IV contrast.   No significant 
calcifications.   No hydronephrosis or hydroureter.

AORTA AND VESSELS: No aneurysm.

RETROPERITONEUM: No retroperitoneal adenopathy, hemorrhage or masses.

APPENDIX: Normal.

LARGE AND SMALL BOWEL: No dilatation.  No masses.  No wall thickening.

ABDOMINAL WALL: No hernia or masses.

PERITONEAL CAVITY: No free air.  No free fluid.  No peritoneal implants or masses.

PELVIS: Multiple calcified pelvic phleboliths.  Urinary bladder is unremarkable.  Small fat containin
g left inguinal hernia.  Prostate has normal size.  No pelvic mass.  No pelvic adenopathy.  No free f
luid.

BONES:   Although there is motion in the area, there appears to be an acute subcapital fracture of th
e right femur.  Partial visualization left femur intramedullary lino and pin fixation.  Age-indetermin
ate compression fracture at T12 with moderate height loss.  No retropulsed fracture fragments.  Posto
perative changes with laminectomy in the lumbar spine.  No suspicious bone lesions.

OTHER: No other significant finding.



IMPRESSION:

1. Although there is motion in the pelvis, there appears to be an acute impacted subcapital fracture 
of the right proximal femur.  Further evaluation with dedicated two view radiograph is recommended.

2. Age-indeterminate moderate compression fracture at T12, however this is stable since 8/6/2020 radi
ograph.  This may be subacute or chronic.  No retropulsed fracture fragments.

3. Acute appearing fractures of the anterolateral left 8-10th ribs.

4. Patchy areas of consolidation with areas of nodular consolidation in both lungs, suggestive of inf
ectious/ inflammatory process.  Given nodular components, a follow-up CT of the chest in 3 months is 
recommended to confirm complete resolution.  Commonly reported imaging features of COVID-19  pneumoni
a are present. Other processes such as influenza pneumonia and organizing pneumonia, as can be seen w
ith drug toxicity and connective tissue disease, can cause a similar imaging pattern.

5. No evidence of acute traumatic solid organ or vascular injury in the chest, abdomen or pelvis.



COMMENT:  Findings were called to Dr. Medrano on 8/22/2020 at 1425 hours.



TECHNICAL DOCUMENTATION:  JOB ID:  [id number]

Quality ID # 436: Final reports with documentation of one or more dose reduction techniques (e.g., Au
tomated exposure control, adjustment of the mA and/or kV according to patient size, use of iterative 
reconstruction technique)

 2011 [employer]- All Rights Reserved



Reading location - IP/workstation name: [email or web address]

## 2020-08-22 NOTE — [facility] CRITICAL CARE PROG REPORT
General


Date:: 08/22/20


Hospital Day:: 1


Resuscitation Status: Do Not Resuscitate


Events in the past 12 to 24 Hours:: 





Weak, fractured hip multiple electrolyte disturbances, ARF


Review of systems relevant to events:: 





Renal, pulmonary


Reason for ICU Addmission:: Evaluation





- Medications:


Medications reviewed and adjusted accordingly: Yes


Vasopressors:: 





None


Sedation:: 





None





Physical Exam


Vital Signs: 


                                        











Temp Pulse Resp BP Pulse Ox


 


 98 F   70   19   114/60   92 


 


 08/22/20 10:44  08/22/20 10:44  08/22/20 14:07  08/22/20 10:44  08/22/20 14:07








                                 Intake & Output











 08/21/20 08/22/20 08/23/20





 06:59 06:59 06:59


 


Intake Total   2287


 


Balance   2287


 


Weight   76.1 kg








                                  Weight/Height





Weight                           76.1 kg


Height                           5 ft 7 in








General appearance: PRESENT: no acute distress, cooperative


Head exam: PRESENT: atraumatic, normocephalic


Eye exam: PRESENT: conjunctiva pink, EOMI, PERRLA.  ABSENT: scleral icterus


Ear exam: PRESENT: normal external ear exam


Mouth exam: PRESENT: dry mucosa


Respiratory exam: PRESENT: crackles, symmetrical, unlabored


Cardiovascular exam: PRESENT: RRR.  ABSENT: diastolic murmur, rubs, systolic 

murmur


GI/Abdominal exam: PRESENT: normal bowel sounds, soft.  ABSENT: distended, 

guarding, mass, organolmegaly, rebound, tenderness


Rectal exam: PRESENT: deferred


Extremities exam: PRESENT: +2 edema, other - R foot rotated outward, wife says 

normal.


Neurological exam: PRESENT: alert, awake, oriented to person, oriented to place,

oriented to time, oriented to situation, CN II-XII grossly intact.  ABSENT: 

motor sensory deficit


Psychiatric exam: PRESENT: appropriate affect, normal mood.  ABSENT: homicidal 

ideation, suicidal ideation


Skin exam: PRESENT: dry, intact, warm.  ABSENT: cyanosis, rash





Laboratory/Radiographs


Laboratory Results: 


                                        





                                 08/22/20 11:40 





                                 08/22/20 16:40 





                                        











  08/22/20 08/22/20 08/22/20





  11:40 11:40 12:27


 


WBC  12.8 H  


 


RBC  3.32 L  


 


Hgb  10.4 L  


 


Hct  30.6 L  


 


MCV  92  


 


MCH  31.3  


 


MCHC  34.0  


 


RDW  13.8  


 


Plt Count  171  


 


Seg Neutrophils %  89.1 H  


 


Carbonic Acid   


 


HCO3/H2CO3 Ratio   


 


ABG pH   


 


ABG pCO2   


 


ABG pO2   


 


ABG HCO3   


 


ABG O2 Saturation   


 


ABG Base Excess   


 


FiO2   


 


Sodium   Cancelled 


 


Potassium   Cancelled 


 


Chloride   Cancelled 


 


Carbon Dioxide   Cancelled 


 


Anion Gap   Cancelled 


 


BUN   Cancelled 


 


Creatinine   Cancelled 


 


Est GFR ( Amer)   Cancelled 


 


Est GFR (Non-Af Amer)   Cancelled 


 


Glucose   Cancelled 


 


Lactic Acid   


 


Calcium   Cancelled 


 


Magnesium   


 


Total Bilirubin   Cancelled 


 


AST   Cancelled 


 


Alkaline Phosphatase   Cancelled 


 


Total Protein   Cancelled 


 


Albumin   Cancelled 


 


Urine Color    YELLOW


 


Urine Appearance    SLIGHTLY-CLOUDY


 


Urine pH    5.0


 


Ur Specific [place]    1.011


 


Urine Protein    NEGATIVE


 


Urine Glucose (UA)    NEGATIVE


 


Urine Ketones    NEGATIVE


 


Urine Blood    SMALL H


 


Urine Nitrite    NEGATIVE


 


Ur Leukocyte Esterase    NEGATIVE


 


Urine WBC (Auto)    2


 


Urine RBC (Auto)    1














  08/22/20 08/22/20 08/22/20





  12:38 12:38 12:38


 


WBC   


 


RBC   


 


Hgb   


 


Hct   


 


MCV   


 


MCH   


 


MCHC   


 


RDW   


 


Plt Count   


 


Seg Neutrophils %   


 


Carbonic Acid   


 


HCO3/H2CO3 Ratio   


 


ABG pH   


 


ABG pCO2   


 


ABG pO2   


 


ABG HCO3   


 


ABG O2 Saturation   


 


ABG Base Excess   


 


FiO2   


 


Sodium   144.0 


 


Potassium   3.1 L 


 


Chloride   117 H 


 


Carbon Dioxide   10 L* 


 


Anion Gap   17 


 


BUN   74 H 


 


Creatinine   4.77 H 


 


Est GFR ( Amer)   14 L 


 


Est GFR (Non-Af Amer)   


 


Glucose   79 


 


Lactic Acid  0.6 L  


 


Calcium   4.5 L* 


 


Magnesium    0.9 L*


 


Total Bilirubin   0.5 


 


AST   58 


 


Alkaline Phosphatase   67 


 


Total Protein   6.1 L 


 


Albumin   2.9 L 


 


Urine Color   


 


Urine Appearance   


 


Urine pH   


 


Ur Specific Gravity   


 


Urine Protein   


 


Urine Glucose (UA)   


 


Urine Ketones   


 


Urine Blood   


 


Urine Nitrite   


 


Ur Leukocyte Esterase   


 


Urine WBC (Auto)   


 


Urine RBC (Auto)   














  08/22/20 08/22/20





  16:40 17:35


 


WBC  


 


RBC  


 


Hgb  


 


Hct  


 


MCV  


 


MCH  


 


MCHC  


 


RDW  


 


Plt Count  


 


Seg Neutrophils %  


 


Carbonic Acid   0.65 L


 


HCO3/H2CO3 Ratio   14:1


 


ABG pH   7.24 L


 


ABG pCO2   21.7 L


 


ABG pO2   74.9 L


 


ABG HCO3   9.1 L


 


ABG O2 Saturation   93.0 L


 


ABG Base Excess   -16.6


 


FiO2   28%


 


Sodium  143.9 


 


Potassium  3.3 L 


 


Chloride  119 H 


 


Carbon Dioxide  10 L* 


 


Anion Gap  15 


 


BUN  71 H 


 


Creatinine  4.29 H 


 


Est GFR ( Amer)  16 L 


 


Est GFR (Non-Af Amer)  


 


Glucose  87 


 


Lactic Acid  


 


Calcium  4.6 L* 


 


Magnesium  


 


Total Bilirubin  


 


AST  


 


Alkaline Phosphatase  


 


Total Protein  


 


Albumin  


 


Urine Color  


 


Urine Appearance  


 


Urine pH  


 


Ur Specific Gravity  


 


Urine Protein  


 


Urine Glucose (UA)  


 


Urine Ketones  


 


Urine Blood  


 


Urine Nitrite  


 


Ur Leukocyte Esterase  


 


Urine WBC (Auto)  


 


Urine RBC (Auto)  








                                        











  08/22/20 08/22/20 08/22/20





  11:40 11:40 12:38


 


Creatine Kinase  Cancelled   1513 H


 


Troponin I   Cancelled 


 


NT-Pro-B Natriuret Pep   














  08/22/20 08/22/20





  12:38 12:38


 


Creatine Kinase  


 


Troponin I  0.122 


 


NT-Pro-B Natriuret Pep   4610 H











Impressions: 


                                        





Cervical Spine CT  08/22/20 12:18


IMPRESSION:  No acute fracture or dislocation of the cervical spine.  Multilevel

spondylosis, degenerative disc disease, and facet arthropathy.


 








Head CT  08/22/20 12:18


IMPRESSION:  No acute intracranial hemorrhage, mass, or evidence of acute 

territorial infarct.


EVIDENCE OF ACUTE STROKE: NO.


 








Chest CT  08/22/20 12:21


IMPRESSION:


1. Although there is motion in the pelvis, there appears to be an acute impacted

subcapital fracture of the right proximal femur.  Further evaluation with 

dedicated two view radiograph is recommended.


2. Age-indeterminate moderate compression fracture at T12, however this is 

stable since 8/6/2020 radiograph.  This may be subacute or chronic.  No 

retropulsed fracture fragments.


3. Acute appearing fractures of the anterolateral left 8-10th ribs.


4. Patchy areas of consolidation with areas of nodular consolidation in both 

lungs, suggestive of infectious/ inflammatory process.  Given nodular 

components, a follow-up CT of the chest in 3 months is recommended to confirm 

complete resolution.  Commonly reported imaging features of COVID-19  pneumonia 

are present. Other processes such as influenza pneumonia and organizing 

pneumonia, as can be seen with drug toxicity and connective tissue disease, can 

cause a similar imaging pattern.


5. No evidence of acute traumatic solid organ or vascular injury in the chest, 

abdomen or pelvis.


 








Abdomen/Pelvis CT  08/22/20 12:23


IMPRESSION:


1. Although there is motion in the pelvis, there appears to be an acute impacted

subcapital fracture of the right proximal femur.  Further evaluation with 

dedicated two view radiograph is recommended.


2. Age-indeterminate moderate compression fracture at T12, however this is 

stable since 8/6/2020 radiograph.  This may be subacute or chronic.  No 

retropulsed fracture fragments.


3. Acute appearing fractures of the anterolateral left 8-10th ribs.


4. Patchy areas of consolidation with areas of nodular consolidation in both 

lungs, suggestive of infectious/ inflammatory process.  Given nodular co

mponents, a follow-up CT of the chest in 3 months is recommended to confirm 

complete resolution.  Commonly reported imaging features of COVID-19  pneumonia 

are present. Other processes such as influenza pneumonia and organizing 

pneumonia, as can be seen with drug toxicity and connective tissue disease, can 

cause a similar imaging pattern.


5. No evidence of acute traumatic solid organ or vascular injury in the chest, 

abdomen or pelvis.


 








Hip/Pelvis X-Ray  08/22/20 14:33


IMPRESSION:


1. Acute subcapital fracture right femur.


2. Moderate osteopenia.


 











EKG: 





Accelerated junctional


All labs, radiographs, diagnostic studies and EKGs were personally reviewed: Yes


In addition, reports of radiographic and diagnostic studies were read: Yes





Assessment and Plan





- Diagnosis


(1) ARF (acute renal failure)


Qualifiers: 


   Acute renal failure type: unspecified   Qualified Code(s): N17.9 - Acute 

kidney failure, unspecified   


Is this a current diagnosis for this admission?: Yes   


Plan: 


He is very dehydrated. His wife says he has not been eating or drinking well for

3 days or more. He is acidotic but not hyperkalemic and does not need dialysis. 

He needs fluid slowly in view of his BNP which is likely pulmonary HTN.








(2) Hypocalcemia


Is this a current diagnosis for this admission?: Yes   


Plan: 


His level is 4.5. I would give calcium, 4gms and recheck level in AM








(3) Hypomagnesemia


Is this a current diagnosis for this admission?: Yes   


Plan: 


This is quite low at 0.9. He will likely need more than 2 gms, but I would do 2 

then recheck in a day in view of his ARF








(4) Pneumonia


Qualifiers: 


   Pneumonia type: due to unspecified organism   Laterality: bilateral 


Is this a current diagnosis for this admission?: Yes   


Plan: 


This is likel aspiration. However a bacterial infection cannot be ruled out. 

COVID is a possibility but at age 89 I would think he would be sicker.








(5) Closed left hip fracture


Qualifiers: 


   Encounter type: initial encounter   Qualified Code(s): S72.002A - Fracture of

unspecified part of neck of left femur, initial encounter for closed fracture   


Is this a current diagnosis for this admission?: Yes   


Plan: 


Impacted fracture of L hip. Ideally this should be intraoperatively repaired. 

His dehydration, electrolytes must be replaced, recovery from PNA before this 

can be entertained at all, if ever. He will need SNF placement regardless.








(6) Metabolic acidosis


Is this a current diagnosis for this admission?: Yes   


Plan: 


This is from renal failure and he would benefit from a bicarb drip and 

nephrology consult.





Plan Summary: 





He needs admission for these issues but he is not in need of the ICU at this 

time. He is also DNR





Critical Time


Critical Time (minutes): 40


Level of Care: IMCU


Anticipated discharge: SNF


Anticipated DC Timeframe: Other


-: 


1.  The care of a critical patient is a dynamic process.  This note is a 

representative synopsis but static in nature.  The timeframe for treatments 

given in order is not necessarily the actual time these treatments may have been

done.





2.  This patient requires critical care secondary to ongoing requirements for 

therapy not offered or safe outside the critical care environment.  Transfer to 

a lower level of care will result in altered life or limb morbidity and 

mortality.





3.  Multidisciplinary rounds completed.





4.  ABCDE bundle addressed.

## 2020-08-22 NOTE — RADIOLOGY REPORT (SQ)
EXAM DESCRIPTION:  CT CERVICAL SPINE WITHOUT



IMAGES COMPLETED DATE/TIME:  8/22/2020 1:07 pm



REASON FOR STUDY:  traumatic fall



COMPARISON:  None.



TECHNIQUE:  Axial images acquired through the cervical spine without intravenous contrast.  Images re
viewed with lung, soft tissue and bone windows.  Reconstructed coronal and sagittal MPR images review
ed.  Images stored on PACS.

All CT scanners at this facility use dose modulation, iterative reconstruction, and/or weight based d
osing when appropriate to reduce radiation dose to as low as reasonably achievable (ALARA).

[facility]: Dose Right  [facility]: CareDose    [facility]: Dose Right    [facility]: Teradose 4D    [facility]: Smart Streamline



RADIATION DOSE:  CT Rad equipment meets quality standard of care and radiation dose reduction techniq
ues were employed. CTDIvol: 22.1 mGy. DLP: 481 mGy-cm. mGy.



LIMITATIONS:  None.



FINDINGS:  ALIGNMENT: There is mild anterolisthesis C3 on C4 approximately 5 mm.

MINERALIZATION: Normal.

VERTEBRAL BODIES: No acute fracture or loss of vertebral body heights.  Multilevel spondylosis, subch
ondral sclerosis and cystic change.  Posterior projecting marginal osteophytes.

DISCS: Degenerative disc disease with loss of intervertebral disc height.

FACETS, LATERAL MASSES, POSTERIOR ELEMENTS: No fractures.  No dislocation.  No acute findings.

HARDWARE: None in the spine.

VISUALIZED RIBS: No fractures.

LUNG APICES AND SOFT TISSUES: Patchy areas of ground-glass attenuation and mild consolidation in the 
upper lobes.  Please see separate dictation of CT chest, abdomen and pelvis.

OTHER: No other significant finding.



IMPRESSION:  No acute fracture or dislocation of the cervical spine.  Multilevel spondylosis, degener
ative disc disease, and facet arthropathy.



TECHNICAL DOCUMENTATION:  JOB ID:  [id number]

Quality ID # 436: Final reports with documentation of one or more dose reduction techniques (e.g., Au
tomated exposure control, adjustment of the mA and/or kV according to patient size, use of iterative 
reconstruction technique)

 2011 [employer]- All Rights Reserved



Reading location - IP/workstation name: [email or web address]

## 2020-08-22 NOTE — RADIOLOGY REPORT (SQ)
EXAM DESCRIPTION:  CT CHEST WITHOUT; CT ABD/PELVIS NO ORAL OR IV



IMAGES COMPLETED DATE/TIME:  8/22/2020 1:07 pm



REASON FOR STUDY:  traumatic fall



COMPARISON:  Pelvis radiograph, 1/11/2019.  Two-view chest radiograph, 8/6/2020.



TECHNIQUE:  CT scan of the chest performed without intravenous contrast using helical scanning techni
que.  Images reviewed with lung, soft tissue and bone windows. Reconstructed coronal and sagittal MPR
 images reviewed.  All images stored on PACS.

CT scan of the abdomen and pelvis performed without intravenous contrast and withoutoral contrast usi
ng helical scanning technique with dynamic intravenous contrast injection.  Images reviewed with lung
, soft tissue and bone windows.  Reconstructed coronal and sagittal MPR images reviewed.  All images 
stored on PACS.

All CT scanners at this facility use dose modulation, iterative reconstruction, and/or weight based d
osing when appropriate to reduce radiation dose to as low as reasonably achievable (ALARA).

[facility]: Dose Right  [facility]: CareDose    [facility]: Dose Right    [facility]: Teradose 4D    [facility]: Smart Technologies



RADIATION DOSE:  CT Rad equipment meets quality standard of care and radiation dose reduction techniq
ues were employed. CTDIvol: 14.4 mGy. DLP: 961 mGy-cm. mGy.



LIMITATIONS:  No technical limitations.



FINDINGS:  CHEST:

AXILLAE: No adenopathy.

CHEST WALL: No masses.  No subcutaneous air.

LUNGS: The trachea has normal caliber and appearance.  There are patchy areas of consolidation and gr
ound-glass attenuation in both lungs, with more nodular components in the left upper lobe with a grou
nd-glass nodule measuring 1.3 cm and a more focal nodule in the right upper lobe, sub solid nodule me
asuring 2.3 cm.  No pleural effusion or pneumothorax.

PLEURA: No effusions.  No calcifications.

THYROID: Thyromegaly with retrosternal component.  No focal thyroid nodule.

HILAR AND MEDIASTINAL STRUCTURES: Small hiatal hernia.  No mediastinal mass or adenopathy.

AORTA AND GREAT VESSELS: No aneurysm.

HEART: There is moderate cardiomegaly.  No pericardial effusion.  Calcified coronary arteries.

HARDWARE AND LIFELINES: None.

BONES: A nondisplaced fractures of the left anterior 8th, 9th, and 10th ribs.  No suspicious bone les
ions.  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  Osteoarthritis bilateral desiree
ulders.  Chronic healed right anterolateral 5th-7th ribs.

OTHER: No other significant finding.

ABDOMEN AND PELVIS:

LIVER: There is beam hardening artifact through the liver which obscures some detail.  Evaluation of 
the parenchyma is limited due to artifact and lack of IV contrast.  Normal size and contour.  No bili
anne ductal dilation.

SPLEEN: Pain has normal size.  Evaluation of the parenchyma is limited due to motion and beam hardeni
ng artifact.

PANCREAS: There appear to be multiple pancreatic calcifications within the parenchyma, consistent wit
h sequelae from chronic pancreatitis.  No peripancreatic inflammation.  No pancreatic ductal dilation
.

GALLBLADDER: No identified stones by CT criteria. No inflammatory changes to suggest cholecystitis.

ADRENAL GLANDS: No significant masses or asymmetry.

RIGHT KIDNEY AND URETER: No solid masses. Assessment limited by lack of IV contrast.   No significant
 calcifications.   No hydronephrosis or hydroureter.

LEFT KIDNEY AND URETER: No solid masses. Assessment limited by lack of IV contrast.   No significant 
calcifications.   No hydronephrosis or hydroureter.

AORTA AND VESSELS: No aneurysm.

RETROPERITONEUM: No retroperitoneal adenopathy, hemorrhage or masses.

APPENDIX: Normal.

LARGE AND SMALL BOWEL: No dilatation.  No masses.  No wall thickening.

ABDOMINAL WALL: No hernia or masses.

PERITONEAL CAVITY: No free air.  No free fluid.  No peritoneal implants or masses.

PELVIS: Multiple calcified pelvic phleboliths.  Urinary bladder is unremarkable.  Small fat containin
g left inguinal hernia.  Prostate has normal size.  No pelvic mass.  No pelvic adenopathy.  No free f
luid.

BONES:   Although there is motion in the area, there appears to be an acute subcapital fracture of th
e right femur.  Partial visualization left femur intramedullary lino and pin fixation.  Age-indetermin
ate compression fracture at T12 with moderate height loss.  No retropulsed fracture fragments.  Posto
perative changes with laminectomy in the lumbar spine.  No suspicious bone lesions.

OTHER: No other significant finding.



IMPRESSION:

1. Although there is motion in the pelvis, there appears to be an acute impacted subcapital fracture 
of the right proximal femur.  Further evaluation with dedicated two view radiograph is recommended.

2. Age-indeterminate moderate compression fracture at T12, however this is stable since 8/6/2020 radi
ograph.  This may be subacute or chronic.  No retropulsed fracture fragments.

3. Acute appearing fractures of the anterolateral left 8-10th ribs.

4. Patchy areas of consolidation with areas of nodular consolidation in both lungs, suggestive of inf
ectious/ inflammatory process.  Given nodular components, a follow-up CT of the chest in 3 months is 
recommended to confirm complete resolution.  Commonly reported imaging features of COVID-19  pneumoni
a are present. Other processes such as influenza pneumonia and organizing pneumonia, as can be seen w
ith drug toxicity and connective tissue disease, can cause a similar imaging pattern.

5. No evidence of acute traumatic solid organ or vascular injury in the chest, abdomen or pelvis.



COMMENT:  Findings were called to Dr. Medrano on 8/22/2020 at 1425 hours.



TECHNICAL DOCUMENTATION:  JOB ID:  [id number]

Quality ID # 436: Final reports with documentation of one or more dose reduction techniques (e.g., Au
tomated exposure control, adjustment of the mA and/or kV according to patient size, use of iterative 
reconstruction technique)

 2011 [email or web address]- All Rights Reserved



Reading location - IP/workstation name: [email or web address]

## 2020-08-22 NOTE — ER DOCUMENT REPORT
Entered by SERA MARCH SCRIBE  20 1146 





Acting as scribe for:MAIKEL PENN MD





ED General





- General


Chief Complaint: Medical Complaint


Stated Complaint: WELLNESS CHECK


Time Seen by Provider: 20 10:43


Primary Care Provider: 


DENICE PANDEY MD [Primary Care Provider] - Follow up as needed


Mode of Arrival: Ambulatory


Information source: Patient


Notes: 





This 89 year old male patient presents to the emergency department today with 

absolutely no complaints. He states that his wife called EMS because she did not

think he was acting right. Patient denies any and all symptoms including pain, 

headache, dizziness, chest pain, or shortness of breath. 





Wife is now here and is able to provide more history. Per wife they have been 

 70 years and she is unable to care for him at home any longer. She 

reports multiple recent falls at home. Today he asked her for pain medication 

for a painful hip likely related to a fall. 





TRAVEL OUTSIDE OF THE [place] IN LAST 30 DAYS: No





- Related Data


Allergies/Adverse Reactions: 


                                        





No Known Allergies Allergy (Verified 20 12:45)


   








Home Medications: aspirin, bupropion, hydrochlorothiazide, metoprolol, 

tamsulosin, venlafaxine





Past Medical History





- General


Information source: Patient





- Social History


Smoking Status: Never Smoker


Cigarette use (# per day): No


Chew tobacco use (# tins/day): No


Frequency of alcohol use: None


Drug Abuse: None


Lives with: Family


Family History: Reviewed & Not Pertinent





- Past Medical History


Cardiac Medical History: Reports: Hx Atrial Fibrillation, Hx Hypertension


Endocrine Medical History: Reports: Hx Diabetes Mellitus Type 2


Psychiatric Medical History: Reports: Hx Depression


Past Surgical History: Reports: Hx Orthopedic Surgery - back, R ankle, R hip





- Immunizations


Hx Diphtheria, Pertussis, Tetanus Vaccination: No - unsure 





Review of Systems





- Review of Systems


Constitutional: No symptoms reported


EENT: No symptoms reported


Cardiovascular: No symptoms reported


Respiratory: No symptoms reported


Gastrointestinal: No symptoms reported


Genitourinary: No symptoms reported


Male Genitourinary: No symptoms reported


Musculoskeletal: No symptoms reported


Skin: No symptoms reported


Hematologic/Lymphatic: No symptoms reported


Neurological/Psychological: No symptoms reported


-: Yes All other systems reviewed and negative





Physical Exam





- Vital signs


Vitals: 


                                        











Temp Pulse Resp BP Pulse Ox


 


 98 F   70   18   114/60   94 


 


 0822/20 10:44  20 10:44  20 10:44  20 10:44  20 10:44











Interpretation: Normal





- General


General appearance: Appears well, Alert





- HEENT


Head: Normocephalic, Atraumatic


Eyes: Normal


Pupils: PERRL


Mucous membranes: Dry - oral mucosa is quite dry





- Respiratory


Respiratory status: No respiratory distress


Chest status: Nontender


Breath sounds: Normal


Chest palpation: Normal





- Cardiovascular


Rhythm: Regular


Heart sounds: Normal auscultation


Murmur: No





- Abdominal


Inspection: Normal


Distension: No distension


Bowel sounds: Normal


Tenderness: Nontender


Organomegaly: No organomegaly





- Back


Back: Normal, Nontender





- Extremities


General upper extremity: Normal ROM


Hip: Tender - There is tenderness to palpation of the right hip. Passive ROM of 

knee causes pain in right hip.





- Neurological


Neuro grossly intact: Yes


Cognition: Normal


Orientation: AAOx4


[place] Coma Scale Eye Opening: Spontaneous


[place] Coma Scale Verbal: Oriented


[place] Coma Scale Motor: Obeys Commands


Azeem Coma Scale Total: 15


Speech: Normal


Motor strength normal: LUE, RUE, LLE, RLE


Sensory: Normal





- Psychological


Associated symptoms: Normal affect, Normal mood





- Skin


Skin Temperature: Warm


Skin Moisture: Dry


Skin Color: Normal





Course





- Re-evaluation


Re-evalutation: 





20 16:26


Right hip pain, still causing distress with movement.


Patient has multiple medical conditions occurring at this time patient has a cl

osed left hip fracture right hip fracture also pneumonia noted on CT scan of 

chest with left rib fractures 8 through 10 without a pneumothorax or hemothorax 

, elevated troponin non-STEMI without ST elevations, acute metabolic acidosis 

with acute on chronic renal failure with a BUN of 74 and creatinine of 4.7, , 

and metabolic derangement with a low potassium low magnesium and a low calcium 

level.  Patient has acute onset of congestive heart failure with a BNP of 4000 

at this time..  A Llanos catheter was placed and patient diuresed about 700 mL of

urine clear color.  Patient is on 2 L nasal O2 at this time with a sat around 92

to 95%.


20 16:57


Of note discussed with patient wife advanced directive considerations and she 

reports to me that she and her  have talked about this prior to this date

and neither 1 would prefer to have any heroic measures including CPR intubation 

or resuscitation performed if there heart or respirations were .  

Therefore DNR DO NOT RESUSCITATE order has been implemented and signed by Dr. Penn today.


20 17:01


Patient is hemodynamically stable at this time and his diuresed over thousand 

milliliters of urine at this time.  Patient sats are 95 to 97% on 2 L nasal O2 

patient appears to be resting more comfortable at this time.  As noted patient 

is a DO NOT RESUSCITATE with multiple medical and traumatic problems at this 

time.  The trauma seems to be subacute inasmuch as patient has ecchymosis on the

right hip area that appears to be changed in color from purple to green to 

yellow.  Patient's history is that he is been falling over the last 3 weeks and 

more recently was getting worse and being able to turn and stand with his wife 

assisting him out of the wheelchair.





At this point waiting for consultation from Dr. Shields the intensivist as to 

placing patient in the ICU, versus transfer to a trauma center, versus admit to 

local hospital.


20 17:05


My opinion at this point time patient does not indicate any need for emergent 

dialysis inasmuch as he is diuresing fluid and does not have hyperkalemia and is

tolerating p.o. 2 L nasal O2 with a normal saturation.





- Vital Signs


Vital signs: 


                                        











Temp Pulse Resp BP Pulse Ox


 


 98 F   70   19   114/60   92 


 


 20 10:44  20 10:44  20 14:07  20 10:44  20 14:07











20 16:31


As above pulse ox 92% with a blood pressure 114/60 respiratory rate 19 pulse of 

70 and afebrile.  Patient denies any chest pain at this time.





- Laboratory


Result Diagrams: 


                                 20 11:40





                                 20 16:40


Laboratory results interpreted by me: 


                                        











  20





  11:40 11:40 11:40


 


WBC  12.8 H  


 


RBC  3.32 L  


 


Hgb  10.4 L  


 


Hct  30.6 L  


 


Lymph % (Auto)  5.3 L  


 


Absolute Neuts (auto)  11.4 H  


 


Seg Neutrophils %  89.1 H  


 


PT    19.6 H


 


APTT    52.1 H


 


D-Dimer   3.00 H 


 


Carbonic Acid   


 


ABG pH   


 


ABG pCO2   


 


ABG pO2   


 


ABG HCO3   


 


ABG Total CO2   


 


ABG O2 Saturation   


 


Potassium   


 


Chloride   


 


Carbon Dioxide   


 


BUN   


 


Creatinine   


 


Est GFR ( Amer)   


 


Est GFR (MDRD) Non-Af   


 


Lactic Acid   


 


Calcium   


 


Magnesium   


 


Creatine Kinase   


 


NT-Pro-B Natriuret Pep   


 


Total Protein   


 


Albumin   


 


Urine Blood   














  20





  12:27 12:38 12:38


 


WBC   


 


RBC   


 


Hgb   


 


Hct   


 


Lymph % (Auto)   


 


Absolute Neuts (auto)   


 


Seg Neutrophils %   


 


PT   


 


APTT   


 


D-Dimer   


 


Carbonic Acid   


 


ABG pH   


 


ABG pCO2   


 


ABG pO2   


 


ABG HCO3   


 


ABG Total CO2   


 


ABG O2 Saturation   


 


Potassium    3.1 L


 


Chloride    117 H


 


Carbon Dioxide    10 L*


 


BUN    74 H


 


Creatinine    4.77 H


 


Est GFR ( Amer)    14 L


 


Est GFR (MDRD) Non-Af    12 L


 


Lactic Acid   0.6 L 


 


Calcium    4.5 L*


 


Magnesium   


 


Creatine Kinase    1513 H


 


NT-Pro-B Natriuret Pep   


 


Total Protein    6.1 L


 


Albumin    2.9 L


 


Urine Blood  SMALL H  














  20





  12:38 12:38 16:40


 


WBC   


 


RBC   


 


Hgb   


 


Hct   


 


Lymph % (Auto)   


 


Absolute Neuts (auto)   


 


Seg Neutrophils %   


 


PT   


 


APTT   


 


D-Dimer   


 


Carbonic Acid   


 


ABG pH   


 


ABG pCO2   


 


ABG pO2   


 


ABG HCO3   


 


ABG Total CO2   


 


ABG O2 Saturation   


 


Potassium    3.3 L


 


Chloride    119 H


 


Carbon Dioxide    10 L*


 


BUN    71 H


 


Creatinine    4.29 H


 


Est GFR ( Amer)    16 L


 


Est GFR (MDRD) Non-Af    13 L


 


Lactic Acid   


 


Calcium    4.6 L*


 


Magnesium  0.9 L*  


 


Creatine Kinase   


 


NT-Pro-B Natriuret Pep   4610 H 


 


Total Protein   


 


Albumin   


 


Urine Blood   














  20





  17:35


 


WBC 


 


RBC 


 


Hgb 


 


Hct 


 


Lymph % (Auto) 


 


Absolute Neuts (auto) 


 


Seg Neutrophils % 


 


PT 


 


APTT 


 


D-Dimer 


 


Carbonic Acid  0.65 L


 


ABG pH  7.24 L


 


ABG pCO2  21.7 L


 


ABG pO2  74.9 L


 


ABG HCO3  9.1 L


 


ABG Total CO2  9.7 L


 


ABG O2 Saturation  93.0 L


 


Potassium 


 


Chloride 


 


Carbon Dioxide 


 


BUN 


 


Creatinine 


 


Est GFR ( Amer) 


 


Est GFR (MDRD) Non-Af 


 


Lactic Acid 


 


Calcium 


 


Magnesium 


 


Creatine Kinase 


 


NT-Pro-B Natriuret Pep 


 


Total Protein 


 


Albumin 


 


Urine Blood 








Other laboratory abnormalities not mentioned above in the course includes a CO2 

of 10 and a d-dimer 3.0





- Diagnostic Test


Radiology reviewed: Image reviewed, Reports reviewed


Radiology results interpreted by me: 





20 16:35


CT scan of head shows no acute stroke no evidence of trauma.


20 16:35


CT scan scan cervical spine shows multilevel degenerative changes no no fracture

and spondylo-los is noted


20 16:37


CT scan chest shows multinodular and patchy densities of consolidation in both 

lungs consistent with an infectious/inflammatory process.  Of interest this 

could be COVID-19 pneumonia.. All also noted on chest CT is apparent acute left 

rib fractures 8 through 10 without evidence of any pneumothorax or hemothorax.  

Also an old T12 vertebral body fracture.


20 16:38





20 16:39


CT abdomen and pelvis shows no acute injury to the abdomen pelvis solid organs 

or skeleton.  However noted is a subcapital femoral neck fracture in the right 

hip area.


20 16:42


Plain film x-rays right hip shows a sup acute subcapital femoral fracture.


20 16:50








- EKG Interpretation by Me


Additional EKG results interpreted by me: 





20 16:48


Twelve-lead EKG shows


20 16:56


Twelve-lead EKG shows an indeterminate rhythm ventricular rate is 69 with 

artifact noted to obscure interpretation as to P waves versus artifact.  No ST 

elevations or depressions to suggest ischemia.  Repeat EKG is ordered.  Apparent

prolonged QT interval noted


20 17:32


Repeat twelve-lead EKG shows an accelerated junctional rhythm rate of 70 with 

nonspecific intraventricular conduction delay.  





Critical Care Note





- Critical Care Note


Total time excluding time spent on procedures (mins): 65 - Patient with multiple

medical problems and trauma problems including closed right hip fracture closed 

left rib fractures, metabolic derangement with acute renal failure and 

hypokalemia hypocalcemia and hypomagnesemia, acute metabolic acidosis, pneumonia

with concerns for COVID-19 and the appearance of the pneumonia, IV fluid 

management and replacement of metabolic derangement minerals.  IV antibiotics to

treat pneumonia and pain management.  Also consultation with primary care 

physician who is doing the admission as well as intensivist.  Also consult with 

orthopedic physician.





Discharge





- Discharge


Clinical Impression: 


 Acute renal failure, Hypokalemia, Hypomagnesemia, Hypocalcemia, Leukocytosis, 

Pneumonia, Suspected COVID-19 virus infection, Closed right hip fracture, Left 

rib fracture, Congestive heart failure, Metabolic acidosis, Do not resuscitate





Condition: Critical


Disposition: ADMITTED AS INPATIENT


Admitting Provider: Jerome


Unit Admitted: [facility]


Referrals: 


DENICE PANDEY MD [Primary Care Provider] - Follow up as needed





I personally performed the services described in the documentation, reviewed and

edited the documentation which was dictated to the scribe in my presence, and it

accurately records my words and actions.

## 2020-08-22 NOTE — RADIOLOGY REPORT (SQ)
EXAM DESCRIPTION:  HIP RIGHT AP/LATERAL



IMAGES COMPLETED DATE/TIME:  8/22/2020 1:53 pm



REASON FOR STUDY:  pain



COMPARISON:  CT chest, abdomen and pelvis same date.



NUMBER OF VIEWS:  Two views.



TECHNIQUE:  AP pelvis and cross-table lateral view of the right hip.



LIMITATIONS:  None.



FINDINGS:  MINERALIZATION: Osteopenia.

RIGHT HIP: There is an acute subcapital fracture of the right proximal femur.  Moderate osteoarthriti
s of the femoroacetabular joint.

LEFT HIP: Postoperative changes of ORIF left femur.  Moderate osteoarthritis femoroacetabular joint.

PUBIS AND ISCHIUM: No fracture.

PELVIS: No fracture.

SACRUM: No fracture or dislocation. No worrisome bone lesions.

LOWER LUMBAR SPINE: No fracture or dislocation. No worrisome bone lesions.  No significant disc disea
se.

SOFT TISSUES: No findings.

OTHER: No other significant finding.



IMPRESSION:

1. Acute subcapital fracture right femur.

2. Moderate osteopenia.



TECHNICAL DOCUMENTATION:  JOB ID:  [id number]

 2011 [employer]- All Rights Reserved



Reading location - IP/workstation name: [id number]

## 2020-08-23 NOTE — EKG REPORT
SEVERITY:- ABNORMAL ECG -

SINUS RHYTHM

PROBABLE LEFT ATRIAL ABNORMALITY

NONSPECIFIC T ABNORMALITIES, INFERIOR LEADS

PROLONGED QT INTERVAL

:

Confirmed by: Haim Reyes 23-Aug-2020 20:27:58

## 2020-08-23 NOTE — PDOC CONSULTATION
Consultation


Consult Date: 08/23/20


Attending physician:: DENICE PANDEY


Provider Consulted: CHAD DAO


Consult reason:: Displaced right subcapital femoral neck fracture





History of Present Illness


Admission Date/PCP: 


  08/22/20 18:50





  DENICE PANDEY MD





Patient complains of: 1.  Right hip pain and inability to ambulate.  2.  General

malaise


History of Present Illness: 


MARÍA SWAN is a 89 year old male brought by EMS to the emergency department. 

The patient was brought by his wife because she felt that he was not acting 

normally and she was unable to care for him.  He has sustained multiple falls 

over the last week.  He has not been eating or drinking well.


The patient is admitted to the intensivist service with acute renal failure, 

multiple electrolyte abnormalities, pneumonia(COVID test pending), multiple 

left-sided rib fractures, and a displaced subcapital femoral neck fracture of 

the right hip.








Past Medical History


Cardiac Medical History: Reports: Atrial Fibrillation, Hypertension


   Denies: Coronary Artery Disease, Myocardial Infarction


Pulmonary Medical History: 


   Denies: Asthma, Bronchitis, Chronic Obstructive Pulmonary Disease (COPD), 

Pneumonia


Neurological Medical History: 


   Denies: Seizures


Endocrine Medical History: Reports: Diabetes Mellitus Type 2


Musculoskeltal Medical History: 


   Denies: Arthritis


Psychiatric Medical History: Reports: Depression


Hematology: 


   Denies: Anemia





Past Surgical History


Past Surgical History: Reports: Orthopedic Surgery - back, R ankle, left hip 

gamma nail





Social History


Lives with: Family


Smoking Status: Never Smoker


Electronic Cigarette use?: No


Frequency of Alcohol Use: None


Hx Recreational Drug Use: No


Drugs: None


Hx Prescription Drug Abuse: No





- Advance Directive


Resuscitation Status: Do Not Resuscitate





Family History


Family History: Reviewed & Not Pertinent


Parental Family History Reviewed: No


Children Family History Reviewed: No


Sibling(s) Family History Reviewed.: No





Medication/Allergy


Home Medications: 








Bupropion HCl [Bupropion HCl Sr] 150 mg PO BID 01/11/19 


Tamsulosin HCl [Flomax 0.4 mg Cap.sr] 0.4 mg PO PCSUPPER 01/11/19 


Amlodipine/Valsartan/Hcthiazid [Exforge Hct -25 mg Tab] 1 tab PO QAM 

08/06/20 


Cholecalciferol (Vitamin D3) [Vitamin D3 400 Unit Tablet] 400 unit PO QAM 

08/06/20 


Metoprolol Succinate [Toprol Xl] 100 mg PO QAM 08/06/20 


Potassium Chloride 20 meq PO DAILY 08/06/20 


Simvastatin [Zocor 10 mg Tablet] 10 mg PO QHS 08/06/20 


Aspirin [Adult Low Dose Aspirin EC] 81 mg PO QAM 08/23/20 


Multivitamin [Multiple Vitamins] 1 tab PO QAM 08/23/20 


Venlafaxine HCl ER [Effexor Xr 75 mg Cap.sr] 75 mg PO QAM 08/23/20 








Allergies/Adverse Reactions: 


                                        





No Known Allergies Allergy (Verified 08/06/20 12:45)


   











Review of Systems


ROS unobtainable: Other - As per HPI





Physical Exam


Vital Signs: 


                                        











Temp Pulse Resp BP Pulse Ox


 


 99.2 F   80   21 H  151/71 H  95 


 


 08/23/20 12:00  08/23/20 12:00  08/23/20 12:00  08/23/20 12:00  08/23/20 12:00








                                 Intake & Output











 08/22/20 08/23/20 08/24/20





 06:59 06:59 06:59


 


Intake Total  4716 787


 


Output Total  2220 


 


Balance  2496 787


 


Weight  82.7 kg 











General appearance: PRESENT: cooperative, disheveled


Head exam: PRESENT: atraumatic


Respiratory exam: PRESENT: accessory muscle use, crackles


Cardiovascular exam: PRESENT: systolic murmur


Pulses: PRESENT: other - Pedal pulses are not palpable on the right leg.  There 

is significant edema present of both lower extremities.


Rectal exam: PRESENT: deferred


Musculoskeletal exam: PRESENT: other - The right leg is shortened and externally

rotated.  The patient is able to dorsiflex and plantarflex the right foot.  

Sensation is diminished symmetrically in a stocking glove distribution which the

patient states is his baseline.  Pulses are nonpalpable.





Results


Laboratory Results: 


                                        





                                 08/23/20 05:15 





                                 08/23/20 05:15 





                                        











  08/22/20 08/22/20 08/22/20





  12:38 16:40 16:55


 


WBC   


 


RBC   


 


Hgb   


 


Hct   


 


MCV   


 


MCH   


 


MCHC   


 


RDW   


 


Plt Count   


 


Seg Neutrophils %   


 


Carbonic Acid   


 


HCO3/H2CO3 Ratio   


 


ABG pH   


 


ABG pCO2   


 


ABG pO2   


 


ABG HCO3   


 


ABG O2 Saturation   


 


ABG Base Excess   


 


FiO2   


 


Sodium   143.9 


 


Potassium   3.3 L 


 


Chloride   119 H 


 


Carbon Dioxide   10 L* 


 


Anion Gap   15 


 


BUN   71 H 


 


Creatinine   4.29 H 


 


Est GFR ( Amer)   16 L 


 


Glucose   87 


 


Calcium   4.6 L* 


 


Magnesium  0.9 L*  


 


Total Bilirubin   


 


AST   


 


Alkaline Phosphatase   


 


C-Reactive Protein   


 


Total Protein   


 


Albumin   


 


Triglycerides   


 


Blood Type    O POSITIVE


 


Antibody Screen    NEGATIVE














  08/22/20 08/22/20 08/23/20





  17:35 23:53 05:15


 


WBC    10.9 H


 


RBC    2.99 L


 


Hgb    9.4 L


 


Hct    27.8 L


 


MCV    93


 


MCH    31.5


 


MCHC    33.9


 


RDW    13.7


 


Plt Count    167


 


Seg Neutrophils %    86.0 H


 


Carbonic Acid  0.65 L  


 


HCO3/H2CO3 Ratio  14:1  


 


ABG pH  7.24 L  


 


ABG pCO2  21.7 L  


 


ABG pO2  74.9 L  


 


ABG HCO3  9.1 L  


 


ABG O2 Saturation  93.0 L  


 


ABG Base Excess  -16.6  


 


FiO2  28%  


 


Sodium   


 


Potassium   


 


Chloride   


 


Carbon Dioxide   


 


Anion Gap   


 


BUN   


 


Creatinine   


 


Est GFR (African Amer)   


 


Glucose   


 


Calcium   


 


Magnesium   


 


Total Bilirubin   


 


AST   


 


Alkaline Phosphatase   


 


C-Reactive Protein   152.3 H 


 


Total Protein   


 


Albumin   


 


Triglycerides   132 


 


Blood Type   


 


Antibody Screen   














  08/23/20





  05:15


 


WBC 


 


RBC 


 


Hgb 


 


Hct 


 


MCV 


 


MCH 


 


MCHC 


 


RDW 


 


Plt Count 


 


Seg Neutrophils % 


 


Carbonic Acid 


 


HCO3/H2CO3 Ratio 


 


ABG pH 


 


ABG pCO2 


 


ABG pO2 


 


ABG HCO3 


 


ABG O2 Saturation 


 


ABG Base Excess 


 


FiO2 


 


Sodium  144.1


 


Potassium  3.3 L


 


Chloride  120 H


 


Carbon Dioxide  9 L*


 


Anion Gap  15


 


BUN  63 H


 


Creatinine  3.83 H


 


Est GFR (African Amer)  18 L


 


Glucose  67 L


 


Calcium  5.2 L*


 


Magnesium  1.5 L


 


Total Bilirubin  0.4


 


AST  54


 


Alkaline Phosphatase  58


 


C-Reactive Protein 


 


Total Protein  5.4 L


 


Albumin  2.4 L


 


Triglycerides 


 


Blood Type 


 


Antibody Screen 








                                        











  08/22/20 08/22/20 08/22/20





  11:40 11:40 12:38


 


Creatine Kinase  Cancelled   1513 H


 


Troponin I   Cancelled 


 


NT-Pro-B Natriuret Pep   














  08/22/20 08/22/20 08/22/20





  12:38 12:38 16:55


 


Creatine Kinase   


 


Troponin I  0.122   0.111


 


NT-Pro-B Natriuret Pep   4610 H 











Impressions: 


                                        





Cervical Spine CT  08/22/20 12:18


IMPRESSION:  No acute fracture or dislocation of the cervical spine.  Multilevel

spondylosis, degenerative disc disease, and facet arthropathy.


 








Head CT  08/22/20 12:18


IMPRESSION:  No acute intracranial hemorrhage, mass, or evidence of acute 

territorial infarct.


EVIDENCE OF ACUTE STROKE: NO.


 








Chest CT  08/22/20 12:21


IMPRESSION:


1. Although there is motion in the pelvis, there appears to be an acute impacted

subcapital fracture of the right proximal femur.  Further evaluation with de

dicated two view radiograph is recommended.


2. Age-indeterminate moderate compression fracture at T12, however this is 

stable since 8/6/2020 radiograph.  This may be subacute or chronic.  No 

retropulsed fracture fragments.


3. Acute appearing fractures of the anterolateral left 8-10th ribs.


4. Patchy areas of consolidation with areas of nodular consolidation in both 

lungs, suggestive of infectious/ inflammatory process.  Given nodular 

components, a follow-up CT of the chest in 3 months is recommended to confirm 

complete resolution.  Commonly reported imaging features of COVID-19  pneumonia 

are present. Other processes such as influenza pneumonia and organizing 

pneumonia, as can be seen with drug toxicity and connective tissue disease, can 

cause a similar imaging pattern.


5. No evidence of acute traumatic solid organ or vascular injury in the chest, 

abdomen or pelvis.


 








Abdomen/Pelvis CT  08/22/20 12:23


IMPRESSION:


1. Although there is motion in the pelvis, there appears to be an acute impacted

subcapital fracture of the right proximal femur.  Further evaluation with 

dedicated two view radiograph is recommended.


2. Age-indeterminate moderate compression fracture at T12, however this is 

stable since 8/6/2020 radiograph.  This may be subacute or chronic.  No 

retropulsed fracture fragments.


3. Acute appearing fractures of the anterolateral left 8-10th ribs.


4. Patchy areas of consolidation with areas of nodular consolidation in both 

lungs, suggestive of infectious/ inflammatory process.  Given nodular 

components, a follow-up CT of the chest in 3 months is recommended to confirm 

complete resolution.  Commonly reported imaging features of COVID-19  pneumonia 

are present. Other processes such as influenza pneumonia and organizing 

pneumonia, as can be seen with drug toxicity and connective tissue disease, can 

cause a similar imaging pattern.


5. No evidence of acute traumatic solid organ or vascular injury in the chest, 

abdomen or pelvis.


 








Hip/Pelvis X-Ray  08/22/20 14:33


IMPRESSION:


1. Acute subcapital fracture right femur.


2. Moderate osteopenia.


 














Assessment & Plan





- Diagnosis


(1) ARF (acute renal failure)


Qualifiers: 


   Acute renal failure type: unspecified   Qualified Code(s): N17.9 - Acute 

kidney failure, unspecified   


Is this a current diagnosis for this admission?: Yes   











(4) Hypocalcemia


Is this a current diagnosis for this admission?: Yes   











(7) Subcapital fracture of right femur


Qualifiers: 


   Encounter type: initial encounter 


Is this a current diagnosis for this admission?: Yes   





- Time


Time Spent: 30 to 50 Minutes


Anticipated discharge: SNF


Anticipated DC Timeframe: Other





- Plan Summary


Plan Summary: 





The patient is an 89-year-old man with multiple medical abnormalities including 

acute renal failure, congestive heart failure, pneumonia, multiple left-sided 

rib fractures, electrolyte abnormalities, and a displaced subcapital fracture of

the right hip.


The patient may benefit from right hip hemiarthroplasty to restore his ability 

to ambulate once his medical morbidities are optimized.  This may take several 

days given his current status.  I have discussed the findings and surgical 

procedure with the patient.  I will tentatively plan on performing 

hemiarthroplasty Tuesday or Wednesday this week if this is medically feasible.  





I would appreciate some form of direct communication through the page  

when the medical team feels that he is optimized to undergo the surgical 

procedure.

## 2020-08-23 NOTE — EKG REPORT
SEVERITY:- ABNORMAL ECG -

SINUS RHYTHM

NONSPECIFIC INTRAVENTRICULAR CONDUCTION DELAY

:

Confirmed by: Haim Reyes 23-Aug-2020 20:27:40

## 2020-08-23 NOTE — PDOC PROGRESS REPORT
Subjective


Progress Note for:: 08/23/20


Subjective:: 





Patient reported adequate pain control. Awaiting COVID-19 test result to 

determine time for surgical intervention. No chest pain or difficulty with 

breathing.


Reason For Visit: 


RECURRENT FALLS,RIGHT FEMORAL FRACTURE,PNEUMONIA,








Physical Exam


Vital Signs: 


                                        











Temp Pulse Resp BP Pulse Ox


 


 99.1 F   87   23 H  166/88 H  94 


 


 08/23/20 16:00  08/23/20 16:00  08/23/20 16:00  08/23/20 16:00  08/23/20 16:00








                                 Intake & Output











 08/22/20 08/23/20 08/24/20





 06:59 06:59 06:59


 


Intake Total  4716 1823.66


 


Output Total  2220 1100


 


Balance  2496 723.66


 


Weight  82.7 kg 











General appearance: PRESENT: no acute distress


Head exam: PRESENT: atraumatic, normocephalic


Eye exam: PRESENT: conjunctiva pink.  ABSENT: scleral icterus


Mouth exam: PRESENT: moist


Respiratory exam: PRESENT: clear to auscultation beatrice


Cardiovascular exam: PRESENT: RRR, +S1, +S2.  ABSENT: diastolic murmur, rubs, 

systolic murmur


Vascular exam: ABSENT: pallor


GI/Abdominal exam: PRESENT: normal bowel sounds, soft.  ABSENT: tenderness


Musculoskeletal exam: PRESENT: deformity - external rotation of right lower 

extremity, tenderness - right hip joint


Neurological exam: PRESENT: alert, awake, oriented to person, oriented to place,

oriented to time, oriented to situation, CN II-XII grossly intact.  ABSENT: 

motor sensory deficit


Psychiatric exam: PRESENT: appropriate affect, normal mood.  ABSENT: homicidal 

ideation, suicidal ideation


Skin exam: PRESENT: dry, warm





Results


Laboratory Results: 


                                        





                                 08/23/20 05:15 





                                 08/23/20 05:15 





                                        











  08/22/20 08/23/20 08/23/20





  23:53 05:15 05:15


 


WBC   10.9 H 


 


RBC   2.99 L 


 


Hgb   9.4 L 


 


Hct   27.8 L 


 


MCV   93 


 


MCH   31.5 


 


MCHC   33.9 


 


RDW   13.7 


 


Plt Count   167 


 


Seg Neutrophils %   86.0 H 


 


Sodium    144.1


 


Potassium    3.3 L


 


Chloride    120 H


 


Carbon Dioxide    9 L*


 


Anion Gap    15


 


BUN    63 H


 


Creatinine    3.83 H


 


Est GFR ( Amer)    18 L


 


Glucose    67 L


 


Calcium    5.2 L*


 


Magnesium    1.5 L


 


Total Bilirubin    0.4


 


AST    54


 


Alkaline Phosphatase    58


 


C-Reactive Protein  152.3 H  


 


Total Protein    5.4 L


 


Albumin    2.4 L


 


Triglycerides  132  








                                        











  08/22/20 08/22/20 08/22/20





  11:40 11:40 12:38


 


Creatine Kinase  Cancelled   1513 H


 


Troponin I   Cancelled 


 


NT-Pro-B Natriuret Pep   














  08/22/20 08/22/20 08/22/20





  12:38 12:38 16:55


 


Creatine Kinase   


 


Troponin I  0.122   0.111


 


NT-Pro-B Natriuret Pep   4610 H 











Impressions: 


                                        





Cervical Spine CT  08/22/20 12:18


IMPRESSION:  No acute fracture or dislocation of the cervical spine.  Multilevel

spondylosis, degenerative disc disease, and facet arthropathy.


 








Head CT  08/22/20 12:18


IMPRESSION:  No acute intracranial hemorrhage, mass, or evidence of acute 

territorial infarct.


EVIDENCE OF ACUTE STROKE: NO.


 








Chest CT  08/22/20 12:21


IMPRESSION:


1. Although there is motion in the pelvis, there appears to be an acute impacted

subcapital fracture of the right proximal femur.  Further evaluation with 

dedicated two view radiograph is recommended.


2. Age-indeterminate moderate compression fracture at T12, however this is 

stable since 8/6/2020 radiograph.  This may be subacute or chronic.  No 

retropulsed fracture fragments.


3. Acute appearing fractures of the anterolateral left 8-10th ribs.


4. Patchy areas of consolidation with areas of nodular consolidation in both 

lungs, suggestive of infectious/ inflammatory process.  Given nodular 

components, a follow-up CT of the chest in 3 months is recommended to confirm 

complete resolution.  Commonly reported imaging features of COVID-19  pneumonia 

are present. Other processes such as influenza pneumonia and organizing 

pneumonia, as can be seen with drug toxicity and connective tissue disease, can 

cause a similar imaging pattern.


5. No evidence of acute traumatic solid organ or vascular injury in the chest, 

abdomen or pelvis.


 








Abdomen/Pelvis CT  08/22/20 12:23


IMPRESSION:


1. Although there is motion in the pelvis, there appears to be an acute impacted

subcapital fracture of the right proximal femur.  Further evaluation with 

dedicated two view radiograph is recommended.


2. Age-indeterminate moderate compression fracture at T12, however this is 

stable since 8/6/2020 radiograph.  This may be subacute or chronic.  No 

retropulsed fracture fragments.


3. Acute appearing fractures of the anterolateral left 8-10th ribs.


4. Patchy areas of consolidation with areas of nodular consolidation in both 

lungs, suggestive of infectious/ inflammatory process.  Given nodular 

components, a follow-up CT of the chest in 3 months is recommended to confirm 

complete resolution.  Commonly reported imaging features of COVID-19  pneumonia 

are present. Other processes such as influenza pneumonia and organizing 

pneumonia, as can be seen with drug toxicity and connective tissue disease, can 

cause a similar imaging pattern.


5. No evidence of acute traumatic solid organ or vascular injury in the chest, 

abdomen or pelvis.


 








Hip/Pelvis X-Ray  08/22/20 14:33


IMPRESSION:


1. Acute subcapital fracture right femur.


2. Moderate osteopenia.


 














Assessment & Plan





- Diagnosis


(1) Recurrent falls


Is this a current diagnosis for this admission?: Yes   





(2) Subcapital fracture of right femur


Qualifiers: 


   Encounter type: initial encounter 


Is this a current diagnosis for this admission?: Yes   





(3) Pneumonia


Qualifiers: 


   Pneumonia type: due to unspecified organism   Laterality: bilateral 


Is this a current diagnosis for this admission?: Yes   





(4) Suspected COVID-19 virus infection


Is this a current diagnosis for this admission?: Yes   





(5) ARF (acute renal failure)


Qualifiers: 


   Acute renal failure type: unspecified   Qualified Code(s): N17.9 - Acute 

kidney failure, unspecified   


Is this a current diagnosis for this admission?: Yes   





(6) Hypocalcemia


Is this a current diagnosis for this admission?: Yes   





(7) Hypokalemia


Is this a current diagnosis for this admission?: Yes   





(8) Hypomagnesemia


Is this a current diagnosis for this admission?: Yes   





(9) Left rib fracture


Qualifiers: 


   Encounter type: initial encounter   Fracture type: closed 


Is this a current diagnosis for this admission?: Yes   





(10) Thyromegaly


Is this a current diagnosis for this admission?: Yes   





- Time


Time Spent with patient: 25-34 minutes


Level of Care: IMCU


Medications reviewed and adjusted accordingly: Yes


Anticipated discharge: SNF


Anticipated DC Timeframe: within 72 hours





- Inpatient Certification


Based on my medical assessment, after consideration of the patient's 

comorbidities, presenting symptoms, or acuity I expect that the services needed 

warrant INPATIENT care.: Yes


I certify that my determination is in accordance with my understanding of 

Medicare's requirements for reasonable and necessary INPATIENT services [42 CFR 

412.3e].: Yes


Medical Necessity: Significant Comorbidiites Make Outpatient Treatment Too 

Risky, Need Close Monitoring Due to Risk of Patient Decompensation, Need For IV 

Fluids, Need For Continuous Telemetry Monitoring, Need for Pain Control, Need 

for IV Antibiotics, Risk of Complication if Not Cared For in Hospital, Risk of 

Diagnosis Which Will Require Inpatient Eval/Care/Monitoring


Post Hospital Care: D/C or Transfer Summary





- Plan Summary


Plan Summary: 





Continue current medication management. Follow up on pending lab results.

## 2020-08-23 NOTE — PDOC H&P
History of Present Illness


Admission Date/PCP: 


  08/22/20 18:50





  DENICE PANDEY MD





History of Present Illness: 


MARÍA SWAN is a 89 year old male patient of Dr. Pandey who presented to 

the ED with spouse reported recurrent falls and worsening ability to care for 

himself or participate in his assistance. Patient denied any difficulty with his

breathing or chest pain but expressed right hip pain and requested for pain 

medication as per his wife report. No reported nausea, vomiting, or abdominal 

pain. He denied headache, dizziness, fever or chills. His initial ED evaluation 

revealed severe electrolytes derangement, right hip subcapital fracture with 

multiple left rib fractures and bilateral consolidations suggestive of COVID-19 

infection. Also, there is thyromegaly with retrosternal component. Patient was 

advised hospitalization for further evaluation and management. His morbidities 

are as listed below.





Past Medical History


Cardiac Medical History: Reports: Atrial Fibrillation, Hypertension


   Denies: Coronary Artery Disease, Myocardial Infarction


Pulmonary Medical History: 


   Denies: Asthma, Bronchitis, Chronic Obstructive Pulmonary Disease (COPD), 

Pneumonia


Neurological Medical History: 


   Denies: Seizures


Endocrine Medical History: Reports: Diabetes Mellitus Type 2


Musculoskeltal Medical History: 


   Denies: Arthritis


Psychiatric Medical History: Reports: Depression


Hematology: 


   Denies: Anemia





Past Surgical History


Past Surgical History: Reports: Orthopedic Surgery - back, R ankle, R hip





Social History


Lives with: Family


Smoking Status: Never Smoker


Electronic Cigarette use?: No


Frequency of Alcohol Use: None


Hx Recreational Drug Use: No


Drugs: None


Hx Prescription Drug Abuse: No





- Advance Directive


Resuscitation Status: Do Not Resuscitate





Family History


Family History: Reviewed & Not Pertinent


Parental Family History Reviewed: Yes


Children Family History Reviewed: Yes


Sibling(s) Family History Reviewed.: Yes





Medication/Allergy


Home Medications: 








Bupropion HCl [Bupropion HCl Sr] 150 mg PO BID 01/11/19 


Tamsulosin HCl [Flomax 0.4 mg Cap.sr] 0.4 mg PO PCSUPPER 01/11/19 


Amlodipine/Valsartan/Hcthiazid [Exforge Hct -25 mg Tab] 1 tab PO QAM 

08/06/20 


Cholecalciferol (Vitamin D3) [Vitamin D3 400 Unit Tablet] 400 unit PO QAM 

08/06/20 


Metoprolol Succinate [Toprol Xl] 100 mg PO QAM 08/06/20 


Potassium Chloride 20 meq PO DAILY 08/06/20 


Simvastatin [Zocor 10 mg Tablet] 10 mg PO QHS 08/06/20 


Aspirin [Adult Low Dose Aspirin EC] 81 mg PO QAM 08/23/20 


Multivitamin [Multiple Vitamins] 1 tab PO QAM 08/23/20 


Venlafaxine HCl ER [Effexor Xr 75 mg Cap.sr] 75 mg PO QAM 08/23/20 








Allergies/Adverse Reactions: 


                                        





No Known Allergies Allergy (Verified 08/06/20 12:45)


   











Review of Systems


Constitutional: PRESENT: weakness.  ABSENT: chills, fever(s), headache(s)


Eyes: ABSENT: visual disturbances


Ears: ABSENT: hearing changes


Cardiovascular: ABSENT: chest pain, dyspnea on exertion, edema, orthropnea, 

palpitations


Respiratory: ABSENT: cough, hemoptysis


Gastrointestinal: ABSENT: abdominal pain, constipation, diarrhea, hematemesis, 

hematochezia, nausea, vomiting


Genitourinary: ABSENT: dysuria, hematuria


Musculoskeletal: PRESENT: other - right hip pain.  ABSENT: joint swelling


Integumentary: ABSENT: rash, wounds


Neurological: PRESENT: confusion - wife reported that he was not acting right., 

weakness - generalized.  ABSENT: abnormal gait, abnormal speech, dizziness, 

focal weakness, syncope


Psychiatric: ABSENT: anxiety, depression, homidical ideation, suicidal ideation


Endocrine: ABSENT: cold intolerance, heat intolerance, menstrual abnormalities, 

polydipsia, polyuria


Hematologic/Lymphatic: ABSENT: easy bleeding, easy bruising, lymphadenopathy


Allergic/Immunologic: ABSENT: seasonal rhinorrhea





Physical Exam


Vital Signs: 


                                        











Temp Pulse Resp BP Pulse Ox


 


 98.2 F   60   18   119/70   94 


 


 08/22/20 20:43  08/22/20 20:43  08/22/20 20:43  08/22/20 20:43  08/22/20 20:43








                                 Intake & Output











 08/21/20 08/22/20 08/23/20





 06:59 06:59 06:59


 


Intake Total   4716


 


Output Total   970


 


Balance   3746


 


Weight   76.1 kg











General appearance: PRESENT: no acute distress, disheveled


Head exam: PRESENT: atraumatic, normocephalic


Eye exam: PRESENT: conjunctiva pink, EOMI, PERRLA.  ABSENT: scleral icterus


Ear exam: PRESENT: normal external ear exam


Mouth exam: PRESENT: moist, tongue midline


Neck exam: PRESENT: full ROM.  ABSENT: carotid bruit, JVD, lymphadenopathy, 

thyromegaly


Respiratory exam: PRESENT: crackles - scattered bilaterally, decreased breath 

sounds


Cardiovascular exam: PRESENT: RRR, +S1, +S2.  ABSENT: diastolic murmur, rubs, 

systolic murmur


Pulses: PRESENT: normal dorsalis pedis pul, +2 pedal pulses bilateral


Vascular exam: PRESENT: normal capillary refill.  ABSENT: pallor


GI/Abdominal exam: PRESENT: normal bowel sounds, soft.  ABSENT: distended, 

guarding, mass, organolmegaly, rebound, tenderness


Rectal exam: PRESENT: deferred


Musculoskeletal exam: PRESENT: deformity - exterally rotated right lower 

extremity, tenderness - right hip joint with motion


Neurological exam: PRESENT: alert, awake, oriented to person, oriented to place,

oriented to time, oriented to situation, CN II-XII grossly intact.  ABSENT: 

motor sensory deficit


Psychiatric exam: PRESENT: appropriate affect, normal mood.  ABSENT: homicidal 

ideation, suicidal ideation


Skin exam: PRESENT: dry, intact, warm.  ABSENT: cyanosis, rash





Results


Laboratory Results: 


                                        





                                 08/22/20 11:40 





                                 08/22/20 16:40 





                                        











  08/22/20 08/22/20 08/22/20





  11:40 11:40 12:27


 


WBC  12.8 H  


 


RBC  3.32 L  


 


Hgb  10.4 L  


 


Hct  30.6 L  


 


MCV  92  


 


MCH  31.3  


 


MCHC  34.0  


 


RDW  13.8  


 


Plt Count  171  


 


Seg Neutrophils %  89.1 H  


 


Carbonic Acid   


 


HCO3/H2CO3 Ratio   


 


ABG pH   


 


ABG pCO2   


 


ABG pO2   


 


ABG HCO3   


 


ABG O2 Saturation   


 


ABG Base Excess   


 


FiO2   


 


Sodium   Cancelled 


 


Potassium   Cancelled 


 


Chloride   Cancelled 


 


Carbon Dioxide   Cancelled 


 


Anion Gap   Cancelled 


 


BUN   Cancelled 


 


Creatinine   Cancelled 


 


Est GFR ( Amer)   Cancelled 


 


Est GFR (Non-Af Amer)   Cancelled 


 


Glucose   Cancelled 


 


Lactic Acid   


 


Calcium   Cancelled 


 


Magnesium   


 


Total Bilirubin   Cancelled 


 


AST   Cancelled 


 


Alkaline Phosphatase   Cancelled 


 


Total Protein   Cancelled 


 


Albumin   Cancelled 


 


Urine Color    YELLOW


 


Urine Appearance    SLIGHTLY-CLOUDY


 


Urine pH    5.0


 


Ur Specific [place]    1.011


 


Urine Protein    NEGATIVE


 


Urine Glucose (UA)    NEGATIVE


 


Urine Ketones    NEGATIVE


 


Urine Blood    SMALL H


 


Urine Nitrite    NEGATIVE


 


Ur Leukocyte Esterase    NEGATIVE


 


Urine WBC (Auto)    2


 


Urine RBC (Auto)    1


 


Blood Type   


 


Antibody Screen   














  08/22/20 08/22/20 08/22/20





  12:38 12:38 12:38


 


WBC   


 


RBC   


 


Hgb   


 


Hct   


 


MCV   


 


MCH   


 


MCHC   


 


RDW   


 


Plt Count   


 


Seg Neutrophils %   


 


Carbonic Acid   


 


HCO3/H2CO3 Ratio   


 


ABG pH   


 


ABG pCO2   


 


ABG pO2   


 


ABG HCO3   


 


ABG O2 Saturation   


 


ABG Base Excess   


 


FiO2   


 


Sodium   144.0 


 


Potassium   3.1 L 


 


Chloride   117 H 


 


Carbon Dioxide   10 L* 


 


Anion Gap   17 


 


BUN   74 H 


 


Creatinine   4.77 H 


 


Est GFR ( Amer)   14 L 


 


Est GFR (Non-Af Amer)   


 


Glucose   79 


 


Lactic Acid  0.6 L  


 


Calcium   4.5 L* 


 


Magnesium    0.9 L*


 


Total Bilirubin   0.5 


 


AST   58 


 


Alkaline Phosphatase   67 


 


Total Protein   6.1 L 


 


Albumin   2.9 L 


 


Urine Color   


 


Urine Appearance   


 


Urine pH   


 


Ur Specific [place]   


 


Urine Protein   


 


Urine Glucose (UA)   


 


Urine Ketones   


 


Urine Blood   


 


Urine Nitrite   


 


Ur Leukocyte Esterase   


 


Urine WBC (Auto)   


 


Urine RBC (Auto)   


 


Blood Type   


 


Antibody Screen   














  08/22/20 08/22/20 08/22/20





  16:40 16:55 17:35


 


WBC   


 


RBC   


 


Hgb   


 


Hct   


 


MCV   


 


MCH   


 


MCHC   


 


RDW   


 


Plt Count   


 


Seg Neutrophils %   


 


Carbonic Acid    0.65 L


 


HCO3/H2CO3 Ratio    14:1


 


ABG pH    7.24 L


 


ABG pCO2    21.7 L


 


ABG pO2    74.9 L


 


ABG HCO3    9.1 L


 


ABG O2 Saturation    93.0 L


 


ABG Base Excess    -16.6


 


FiO2    28%


 


Sodium  143.9  


 


Potassium  3.3 L  


 


Chloride  119 H  


 


Carbon Dioxide  10 L*  


 


Anion Gap  15  


 


BUN  71 H  


 


Creatinine  4.29 H  


 


Est GFR ( Amer)  16 L  


 


Est GFR (Non-Af Amer)   


 


Glucose  87  


 


Lactic Acid   


 


Calcium  4.6 L*  


 


Magnesium   


 


Total Bilirubin   


 


AST   


 


Alkaline Phosphatase   


 


Total Protein   


 


Albumin   


 


Urine Color   


 


Urine Appearance   


 


Urine pH   


 


Ur Specific [place]   


 


Urine Protein   


 


Urine Glucose (UA)   


 


Urine Ketones   


 


Urine Blood   


 


Urine Nitrite   


 


Ur Leukocyte Esterase   


 


Urine WBC (Auto)   


 


Urine RBC (Auto)   


 


Blood Type   O POSITIVE 


 


Antibody Screen   NEGATIVE 








                                        











  08/22/20 08/22/20 08/22/20





  11:40 11:40 12:38


 


Creatine Kinase  Cancelled   1513 H


 


Troponin I   Cancelled 


 


NT-Pro-B Natriuret Pep   














  08/22/20 08/22/20 08/22/20





  12:38 12:38 16:55


 


Creatine Kinase   


 


Troponin I  0.122   0.111


 


NT-Pro-B Natriuret Pep   4610 H 











Impressions: 


                                        





Cervical Spine CT  08/22/20 12:18


IMPRESSION:  No acute fracture or dislocation of the cervical spine.  Multilevel

spondylosis, degenerative disc disease, and facet arthropathy.


 








Head CT  08/22/20 12:18


IMPRESSION:  No acute intracranial hemorrhage, mass, or evidence of acute 

territorial infarct.


EVIDENCE OF ACUTE STROKE: NO.


 








Chest CT  08/22/20 12:21


IMPRESSION:


1. Although there is motion in the pelvis, there appears to be an acute impacted

subcapital fracture of the right proximal femur.  Further evaluation with 

dedicated two view radiograph is recommended.


2. Age-indeterminate moderate compression fracture at T12, however this is 

stable since 8/6/2020 radiograph.  This may be subacute or chronic.  No retro

pulsed fracture fragments.


3. Acute appearing fractures of the anterolateral left 8-10th ribs.


4. Patchy areas of consolidation with areas of nodular consolidation in both 

lungs, suggestive of infectious/ inflammatory process.  Given nodular 

components, a follow-up CT of the chest in 3 months is recommended to confirm 

complete resolution.  Commonly reported imaging features of COVID-19  pneumonia 

are present. Other processes such as influenza pneumonia and organizing 

pneumonia, as can be seen with drug toxicity and connective tissue disease, can 

cause a similar imaging pattern.


5. No evidence of acute traumatic solid organ or vascular injury in the chest, 

abdomen or pelvis.


 








Abdomen/Pelvis CT  08/22/20 12:23


IMPRESSION:


1. Although there is motion in the pelvis, there appears to be an acute impacted

subcapital fracture of the right proximal femur.  Further evaluation with 

dedicated two view radiograph is recommended.


2. Age-indeterminate moderate compression fracture at T12, however this is 

stable since 8/6/2020 radiograph.  This may be subacute or chronic.  No 

retropulsed fracture fragments.


3. Acute appearing fractures of the anterolateral left 8-10th ribs.


4. Patchy areas of consolidation with areas of nodular consolidation in both 

lungs, suggestive of infectious/ inflammatory process.  Given nodular 

components, a follow-up CT of the chest in 3 months is recommended to confirm 

complete resolution.  Commonly reported imaging features of COVID-19  pneumonia 

are present. Other processes such as influenza pneumonia and organizing 

pneumonia, as can be seen with drug toxicity and connective tissue disease, can 

cause a similar imaging pattern.


5. No evidence of acute traumatic solid organ or vascular injury in the chest, 

abdomen or pelvis.


 








Hip/Pelvis X-Ray  08/22/20 14:33


IMPRESSION:


1. Acute subcapital fracture right femur.


2. Moderate osteopenia.


 














Assessment & Plan





- Diagnosis


(1) Recurrent falls


Is this a current diagnosis for this admission?: Yes   


Plan: 


See covering attending physician orders for details about care plan.








(2) Subcapital fracture of right femur


Qualifiers: 


   Encounter type: initial encounter 


Is this a current diagnosis for this admission?: Yes   


Plan: 


See covering attending physician orders for details about care plan.








(3) Pneumonia


Qualifiers: 


   Pneumonia type: due to unspecified organism   Laterality: bilateral 


Is this a current diagnosis for this admission?: Yes   


Plan: 


See covering attending physician orders for details about care plan.








(4) Suspected COVID-19 virus infection


Is this a current diagnosis for this admission?: Yes   


Plan: 


See covering attending physician orders for details about care plan.








(5) ARF (acute renal failure)


Qualifiers: 


   Acute renal failure type: unspecified   Qualified Code(s): N17.9 - Acute 

kidney failure, unspecified   


Is this a current diagnosis for this admission?: Yes   


Plan: 


See covering attending physician orders for details about care plan.








(6) Hypocalcemia


Is this a current diagnosis for this admission?: Yes   


Plan: 


See covering attending physician orders for details about care plan.








(7) Hypokalemia


Is this a current diagnosis for this admission?: Yes   


Plan: 


See covering attending physician orders for details about care plan.








(8) Hypomagnesemia


Is this a current diagnosis for this admission?: Yes   


Plan: 


See covering attending physician orders for details about care plan.








(9) Left rib fracture


Qualifiers: 


   Encounter type: initial encounter   Fracture type: closed 


Is this a current diagnosis for this admission?: Yes   


Plan: 


See covering attending physician orders for details about care plan.








(10) Thyromegaly


Is this a current diagnosis for this admission?: Yes   


Plan: 


See covering attending physician orders for details about care plan.








- Time


Time Spent: 50 to 70 Minutes


Medications reviewed and adjusted accordingly: Yes


Anticipated Discharge Disposition: Skilled Nursing Facility


Anticipated Discharge Timeframe: within 72 hours





- Inpatient Certification


Based on my medical assessment, after consideration of the patient's 

comorbidities, presenting symptoms, or acuity I expect that the services needed 

warrant INPATIENT care.: Yes


I certify that my determination is in accordance with my understanding of 

Medicare's requirements for reasonable and necessary INPATIENT services [42 CFR 

412.3e].: Yes


Medical Necessity: Significant Comorbidiites Make Outpatient Treatment Too 

Risky, Need Close Monitoring Due to Risk of Patient Decompensation, Need For IV 

Fluids, Need For Continuous Telemetry Monitoring, Need for Pain Control, Need 

for IV Antibiotics, Need for Surgery, Risk of Complication if Not Cared For in 

Hospital, Risk of Diagnosis Which Will Require Inpatient Eval/Care/Monitoring


Post Hospital Care: D/C or Transfer Summary





- Plan Summary


Plan Summary: 





See covering attending physician orders for details about care plan.

## 2020-08-24 NOTE — ADVANCED CARE
- Diagnosis


(1) Sepsis


Diagnosis Current: Yes   





(2) Pneumonia due to COVID-19 virus


Diagnosis Current: Yes   





(3) Staphylococcus aureus septicemia


Diagnosis Current: Yes   





(4) Acute kidney injury


Diagnosis Current: Yes   





(5) Multiple fractures of ribs of left side


Diagnosis Current: Yes   





(6) Compression fracture of T12 vertebra


Diagnosis Current: Yes   





(7) Pathologic fracture


Diagnosis Current: Yes   





(8) Hypocalcemia


Diagnosis Current: Yes   





(9) Metabolic acidosis


Diagnosis Current: Yes   





(10) Subcapital fracture of right femur


Diagnosis Current: Yes   


Resuscitation Status: Do Not Resuscitate


Discussion: 





Discussed with spouse

## 2020-08-24 NOTE — PDOC PROGRESS REPORT
Subjective


Progress Note for:: 08/24/20


Subjective:: 


Patient was admitted over the weekend for the management of pneumonia, he was 

admitted in IMCU in isolation, the SARS-CoV-2 test was positive suggesting he 

has COVID pneumonia.  I reviewed records he had CT chest done without contrast, 

the trachea was normal in caliber.  There are patchy areas of consolidation and 

groundglass attenuation in both lungs with more nodular components in the left 

upper lobe with a ground glass nodule that measure 1.3 cm and the more focal 

nodule in the right upper lobe also found was a nondisplaced fractures of the 

left anterior eighth ninth and 10th ribs, no suspicious bone lesion, the 

pancreas also suggest multiple calcifications to the parenchyma consistent with 

chronic pancreatitis also found was an acute subcapital fracture of the right 

femur.  Age indeterminate fracture at T12.  I spoke to the wife today, she 

stated that he did not have a major injury/fall she was very surprised of this 

multiple fractures, this sounds like pathologic fracture either from 

osteoporosis or other causes, we need to rule out paraproteinemia











Reason For Visit: 


RECURRENT FALLS,RIGHT FEMORAL FRACTURE,PNEUMONIA,








Physical Exam


Vital Signs: 


                                        











Temp Pulse Resp BP Pulse Ox


 


 98.7 F   74   20   137/58 H  91 L


 


 08/24/20 15:28  08/24/20 15:28  08/24/20 15:28  08/24/20 15:28  08/24/20 15:28








                                 Intake & Output











 08/23/20 08/24/20 08/25/20





 06:59 06:59 06:59


 


Intake Total 4716 3055.66 865


 


Output Total 2220 2650 1400


 


Balance 2496 405.66 -535


 


Weight 82.7 kg 78.6 kg 78.6 kg











General appearance: PRESENT: no acute distress


Eye exam: PRESENT: PERRLA


Respiratory exam: PRESENT: rhonchi


Cardiovascular exam: PRESENT: +S1, +S2


GI/Abdominal exam: PRESENT: soft


Neurological exam: PRESENT: other - Alert but confused





Results


Laboratory Results: 


                                        





                                 08/24/20 18:28 





                                 08/24/20 18:28 





                                        











  08/24/20 08/24/20 08/24/20





  18:28 18:28 18:28


 


WBC  7.6  


 


RBC  3.09 L  


 


Hgb  9.7 L  


 


Hct  28.2 L  


 


MCV  91  


 


MCH  31.4  


 


MCHC  34.4  


 


RDW  13.9  


 


Plt Count  178  


 


Seg Neutrophils %  79.1 H  


 


Sodium   143.2 


 


Potassium   2.8 L* 


 


Chloride   117 H 


 


Carbon Dioxide   17 L 


 


Anion Gap   9 


 


BUN   46 H 


 


Creatinine   3.01 H 


 


Est GFR ( Amer)   24 L 


 


Glucose   123 H 


 


Calcium   5.8 L* 


 


Total Bilirubin   0.4 


 


AST   41 


 


Alkaline Phosphatase   58 


 


Total Protein   5.5 L 


 


Albumin   2.5 L 


 


PTH Intact    234.7 H








                                        











  08/22/20 08/22/20 08/22/20





  11:40 11:40 12:38


 


Creatine Kinase  Cancelled   1513 H


 


Troponin I   Cancelled 


 


NT-Pro-B Natriuret Pep   














  08/22/20 08/22/20 08/22/20





  12:38 12:38 16:55


 


Creatine Kinase   


 


Troponin I  0.122   0.111


 


NT-Pro-B Natriuret Pep   4610 H 











Impressions: 


                                        





Cervical Spine CT  08/22/20 12:18


IMPRESSION:  No acute fracture or dislocation of the cervical spine.  Multilevel

spondylosis, degenerative disc disease, and facet arthropathy.


 








Head CT  08/22/20 12:18


IMPRESSION:  No acute intracranial hemorrhage, mass, or evidence of acute 

territorial infarct.


EVIDENCE OF ACUTE STROKE: NO.


 








Chest CT  08/22/20 12:21


IMPRESSION:


1. Although there is motion in the pelvis, there appears to be an acute impacted

subcapital fracture of the right proximal femur.  Further evaluation with 

dedicated two view radiograph is recommended.


2. Age-indeterminate moderate compression fracture at T12, however this is 

stable since 8/6/2020 radiograph.  This may be subacute or chronic.  No 

retropulsed fracture fragments.


3. Acute appearing fractures of the anterolateral left 8-10th ribs.


4. Patchy areas of consolidation with areas of nodular consolidation in both 

lungs, suggestive of infectious/ inflammatory process.  Given nodular 

components, a follow-up CT of the chest in 3 months is recommended to confirm 

complete resolution.  Commonly reported imaging features of COVID-19  pneumonia 

are present. Other processes such as influenza pneumonia and organizing 

pneumonia, as can be seen with drug toxicity and connective tissue disease, can 

cause a similar imaging pattern.


5. No evidence of acute traumatic solid organ or vascular injury in the chest, 

abdomen or pelvis.


 








Abdomen/Pelvis CT  08/22/20 12:23


IMPRESSION:


1. Although there is motion in the pelvis, there appears to be an acute impacted

subcapital fracture of the right proximal femur.  Further evaluation with 

dedicated two view radiograph is recommended.


2. Age-indeterminate moderate compression fracture at T12, however this is 

stable since 8/6/2020 radiograph.  This may be subacute or chronic.  No 

retropulsed fracture fragments.


3. Acute appearing fractures of the anterolateral left 8-10th ribs.


4. Patchy areas of consolidation with areas of nodular consolidation in both 

lungs, suggestive of infectious/ inflammatory process.  Given nodular 

components, a follow-up CT of the chest in 3 months is recommended to confirm 

complete resolution.  Commonly reported imaging features of COVID-19  pneumonia 

are present. Other processes such as influenza pneumonia and organizing pneumon

ia, as can be seen with drug toxicity and connective tissue disease, can cause a

similar imaging pattern.


5. No evidence of acute traumatic solid organ or vascular injury in the chest, 

abdomen or pelvis.


 








Hip/Pelvis X-Ray  08/22/20 14:33


IMPRESSION:


1. Acute subcapital fracture right femur.


2. Moderate osteopenia.


 














Assessment & Plan





- Diagnosis


(1) Sepsis


Qualifiers: 


   Sepsis type: methicillin susceptible Staphylococcus aureus   Sepsis acute 

organ dysfunction status: with acute organ dysfunction   Severe sepsis acute 

organ dysfunction type: acute renal failure   Acute renal failure type: with 

acute tubular necrosis   Severe sepsis shock status: without septic shock   

Qualified Code(s): A41.01 - Sepsis due to Methicillin susceptible Staphylococcus

aureus; R65.20 - Severe sepsis without septic shock; N17.0 - Acute kidney 

failure with tubular necrosis   


Is this a current diagnosis for this admission?: Yes   





(2) Pneumonia due to COVID-19 virus


Is this a current diagnosis for this admission?: Yes   


Plan: 


The patient be started on Remdesivir  and dexamethasone











(3) Staphylococcus aureus septicemia


Is this a current diagnosis for this admission?: Yes   


Plan: 


He has a methicillin sensitive staph aureus, MSSA, and with de-escalate 

antibiotic, DC Zyvox, DC azithromycin continue cefepime








(4) Acute kidney injury


Is this a current diagnosis for this admission?: Yes   


Plan: 


CKD he has acute kidney injury on a persistent CKD continue present treatment








(5) Multiple fractures of ribs of left side


Qualifiers: 


   Encounter type: initial encounter   Fracture type: closed   Qualified 

Code(s): S22.42XA - Multiple fractures of ribs, left side, initial encounter for

closed fracture   


Is this a current diagnosis for this admission?: Yes   





(6) Compression fracture of T12 vertebra


Qualifiers: 


   Encounter type: initial encounter   Qualified Code(s): S22.080A - Wedge 

compression fracture of T11-T12 vertebra, initial encounter for closed fracture 

 


Is this a current diagnosis for this admission?: Yes   





(7) Pathologic fracture


Qualifiers: 


   Pathology associated with fracture: osteoporosis   Osteoporosis type: age-

related   Site of pathological fracture: vertebra   Encounter type: initial 

encounter   Qualified Code(s): M80.08XA - Age-related osteoporosis with current 

pathological fracture, vertebra(e), initial encounter for fracture   


Is this a current diagnosis for this admission?: Yes   


Plan: 


He has multiple fracture in the setting of very minimal fall that would warrant 

such extensive fracture suggesting this is probably pathology fracture.  

Probably from the osteoporosis related to elevated PTH








(8) Hypocalcemia


Is this a current diagnosis for this admission?: Yes   


Plan: 


He has severe hypocalcemia with appropriate hyperparathyroidism hypocalcemia is 

the most potent stimulus of PTH secretion, this suggests the hypercalcemia is 

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

present with acute kidney injury probably related to sepsis.  Patient will be 

treated with calcium intravenously








(9) Metabolic acidosis


Is this a current diagnosis for this admission?: Yes   





(10) Subcapital fracture of right femur


Qualifiers: 


   Encounter type: initial encounter   Fracture type: closed   Qualified 

Code(s): S72.011A - Unspecified intracapsular fracture of right femur, initial 

encounter for closed fracture   


Is this a current diagnosis for this admission?: Yes   


Plan: 


Consult orthopedic








- Time


Time Spent with patient: 35 or more minutes


Level of Care: ICU


Medications reviewed and adjusted accordingly: Yes


Anticipated discharge: Home


Anticipated DC Timeframe: Other





- Inpatient Certification


Based on my medical assessment, after consideration of the patient's 

comorbidities, presenting symptoms, or acuity I expect that the services needed 

warrant INPATIENT care.: Yes


I certify that my determination is in accordance with my understanding of 

Medicare's requirements for reasonable and necessary INPATIENT services [42 CFR 

412.3e].: Yes

## 2020-08-25 NOTE — PDOC PROGRESS REPORT
Subjective


Progress Note for:: 08/25/20


Subjective:: 





Patient seen by the bedside, he is more alert today and responsive


Reason For Visit: 


RECURRENT FALLS,RIGHT FEMORAL FRACTURE,PNEUMONIA,








Physical Exam


Vital Signs: 


                                        











Temp Pulse Resp BP Pulse Ox


 


 97.8 F   58 L  19   141/50 H  93 


 


 08/25/20 16:50  08/25/20 16:50  08/25/20 16:50  08/25/20 16:50  08/25/20 16:50








                                 Intake & Output











 08/24/20 08/25/20 08/26/20





 06:59 06:59 06:59


 


Intake Total 3055.66 2741 1050


 


Output Total 2650 2800 


 


Balance 405.66 -59 1050


 


Weight 78.6 kg 78.6 kg 











General appearance: PRESENT: no acute distress


Respiratory exam: PRESENT: clear to auscultation beatrice


Cardiovascular exam: PRESENT: +S1, +S2


GI/Abdominal exam: PRESENT: soft





Results


Laboratory Results: 


                                        





                                 08/25/20 06:05 





                                 08/25/20 06:05 





                                        











  08/24/20 08/24/20 08/24/20





  18:28 18:28 18:28


 


WBC   


 


RBC   


 


Hgb   


 


Hct   


 


MCV   


 


MCH   


 


MCHC   


 


RDW   


 


Plt Count   


 


Seg Neutrophils %   


 


Sodium  143.2  


 


Potassium  2.8 L*  


 


Chloride  117 H  


 


Carbon Dioxide  17 L  


 


Anion Gap  9  


 


BUN  46 H  


 


Creatinine  3.01 H  


 


Est GFR ( Amer)  24 L  


 


Glucose  123 H  


 


Calcium  5.8 L*  


 


Phosphorus    4.3


 


Total Bilirubin  0.4  


 


AST  41  


 


Alkaline Phosphatase  58  


 


Total Protein  5.5 L  


 


Albumin  2.5 L  


 


Amylase   


 


Lipase   


 


PTH Intact   234.7 H 














  08/24/20 08/25/20 08/25/20





  18:28 06:05 06:05


 


WBC   6.3 


 


RBC   3.14 L 


 


Hgb   9.8 L 


 


Hct   28.6 L 


 


MCV   91 


 


MCH   31.3 


 


MCHC   34.3 


 


RDW   13.7 


 


Plt Count   170 


 


Seg Neutrophils %   90.9 H 


 


Sodium    145.5 H


 


Potassium    3.6


 


Chloride    119 H


 


Carbon Dioxide    15 L


 


Anion Gap    12


 


BUN    44 H


 


Creatinine    2.72 H


 


Est GFR ( Amer)    27 L


 


Glucose    161 H


 


Calcium    6.0 L*


 


Phosphorus   


 


Total Bilirubin    0.5


 


AST    42


 


Alkaline Phosphatase  57   60


 


Total Protein    5.5 L


 


Albumin    2.6 L


 


Amylase  < 30 L  


 


Lipase  < 10.0 L  


 


PTH Intact   








                                        





08/22/20 13:05   Blood   Blood Culture (PCR) - Final





                                        











  08/22/20 08/22/20 08/22/20





  11:40 11:40 12:38


 


Creatine Kinase  Cancelled   1513 H


 


Troponin I   Cancelled 


 


NT-Pro-B Natriuret Pep   














  08/22/20 08/22/20 08/22/20





  12:38 12:38 16:55


 


Creatine Kinase   


 


Troponin I  0.122   0.111


 


NT-Pro-B Natriuret Pep   4610 H 











Impressions: 


                                        





Cervical Spine CT  08/22/20 12:18


IMPRESSION:  No acute fracture or dislocation of the cervical spine.  Multilevel

spondylosis, degenerative disc disease, and facet arthropathy.


 








Head CT  08/22/20 12:18


IMPRESSION:  No acute intracranial hemorrhage, mass, or evidence of acute 

territorial infarct.


EVIDENCE OF ACUTE STROKE: NO.


 








Chest CT  08/22/20 12:21


IMPRESSION:


1. Although there is motion in the pelvis, there appears to be an acute impacted

subcapital fracture of the right proximal femur.  Further evaluation with 

dedicated two view radiograph is recommended.


2. Age-indeterminate moderate compression fracture at T12, however this is 

stable since 8/6/2020 radiograph.  This may be subacute or chronic.  No 

retropulsed fracture fragments.


3. Acute appearing fractures of the anterolateral left 8-10th ribs.


4. Patchy areas of consolidation with areas of nodular consolidation in both 

lungs, suggestive of infectious/ inflammatory process.  Given nodular 

components, a follow-up CT of the chest in 3 months is recommended to confirm 

complete resolution.  Commonly reported imaging features of COVID-19  pneumonia 

are present. Other processes such as influenza pneumonia and organizing 

pneumonia, as can be seen with drug toxicity and connective tissue disease, can 

cause a similar imaging pattern.


5. No evidence of acute traumatic solid organ or vascular injury in the chest, 

abdomen or pelvis.


 








Abdomen/Pelvis CT  08/22/20 12:23


IMPRESSION:


1. Although there is motion in the pelvis, there appears to be an acute impacted

subcapital fracture of the right proximal femur.  Further evaluation with 

dedicated two view radiograph is recommended.


2. Age-indeterminate moderate compression fracture at T12, however this is 

stable since 8/6/2020 radiograph.  This may be subacute or chronic.  No 

retropulsed fracture fragments.


3. Acute appearing fractures of the anterolateral left 8-10th ribs.


4. Patchy areas of consolidation with areas of nodular consolidation in both 

lungs, suggestive of infectious/ inflammatory process.  Given nodular co

mponents, a follow-up CT of the chest in 3 months is recommended to confirm 

complete resolution.  Commonly reported imaging features of COVID-19  pneumonia 

are present. Other processes such as influenza pneumonia and organizing 

pneumonia, as can be seen with drug toxicity and connective tissue disease, can 

cause a similar imaging pattern.


5. No evidence of acute traumatic solid organ or vascular injury in the chest, 

abdomen or pelvis.


 








Hip/Pelvis X-Ray  08/22/20 14:33


IMPRESSION:


1. Acute subcapital fracture right femur.


2. Moderate osteopenia.


 








Skeletal Survey  08/25/20 07:00


IMPRESSION:  1.  Acute/subacute impacted fracture of the right femoral neck.


2.  Findings as described.


 














Assessment & Plan





- Diagnosis


(1) Sepsis


Qualifiers: 


   Sepsis type: methicillin susceptible Staphylococcus aureus   Sepsis acute 

organ dysfunction status: with acute organ dysfunction   Severe sepsis acute 

organ dysfunction type: acute renal failure   Acute renal failure type: with 

acute tubular necrosis   Severe sepsis shock status: without septic shock   

Qualified Code(s): A41.01 - Sepsis due to Methicillin susceptible Staphylococcus

aureus; R65.20 - Severe sepsis without septic shock; N17.0 - Acute kidney 

failure with tubular necrosis   


Is this a current diagnosis for this admission?: Yes   


Plan: 


He has staph aureus septicemia, on IV antibiotic, Cefepime








(2) Pneumonia due to COVID-19 virus


Is this a current diagnosis for this admission?: Yes   


Plan: 


continue  Remdesivir for 5 days  and dexamethasone for 10 days 











(3) Staphylococcus aureus septicemia


Is this a current diagnosis for this admission?: Yes   


Plan: 


He has methicillin sensitive staph aureus, MSSA,  continue cefepime








(4) Acute kidney injury


Is this a current diagnosis for this admission?: Yes   


Plan: 


improving 








(5) Multiple fractures of ribs of left side


Qualifiers: 


   Encounter type: initial encounter   Fracture type: closed   Qualified 

Code(s): S22.42XA - Multiple fractures of ribs, left side, initial encounter for

closed fracture   


Is this a current diagnosis for this admission?: Yes   





(6) Compression fracture of T12 vertebra


Qualifiers: 


   Encounter type: initial encounter   Qualified Code(s): S22.080A - Wedge 

compression fracture of T11-T12 vertebra, initial encounter for closed fracture 

 


Is this a current diagnosis for this admission?: Yes   


Plan: 


This is due to osteoporosis








(7) Pathologic fracture


Qualifiers: 


   Pathology associated with fracture: osteoporosis   Osteoporosis type: 

age-related   Site of pathological fracture: vertebra   Encounter type: initial 

encounter   Qualified Code(s): M80.08XA - Age-related osteoporosis with current 

pathological fracture, vertebra(e), initial encounter for fracture   


Is this a current diagnosis for this admission?: Yes   





(8) Hypocalcemia


Is this a current diagnosis for this admission?: Yes   





(9) Metabolic acidosis


Is this a current diagnosis for this admission?: Yes   


Plan: 


improving 








(10) Subcapital fracture of right femur


Qualifiers: 


   Encounter type: initial encounter   Fracture type: closed   Qualified 

Code(s): S72.011A - Unspecified intracapsular fracture of right femur, initial 

encounter for closed fracture   


Is this a current diagnosis for this admission?: Yes   


Plan: 


This is most likely secondary to osteoporotic  fracture








(11) Secondary hyperparathyroidism


Is this a current diagnosis for this admission?: Yes   


Plan: 


He has secondary hyperparathyroidism, he was started on calcitriol yesterday








- Time


Time Spent with patient: 25-34 minutes


Level of Care: IMCU


Medications reviewed and adjusted accordingly: Yes


Anticipated discharge: SNF


Anticipated DC Timeframe: when bed available





- Inpatient Certification


Based on my medical assessment, after consideration of the patient's 

comorbidities, presenting symptoms, or acuity I expect that the services needed 

warrant INPATIENT care.: Yes


I certify that my determination is in accordance with my understanding of 

Medicare's requirements for reasonable and necessary INPATIENT services [42 CFR 

412.3e].: Yes

## 2020-08-25 NOTE — RADIOLOGY REPORT (SQ)
EXAM DESCRIPTION:  BONE SURVEY COMPLETE



IMAGES COMPLETED DATE/TIME:  8/25/2020 4:25 pm



REASON FOR STUDY:  multiple bone fracture



COMPARISON:  None.



TECHNIQUE:  Images of the axial and proximal appendicular skeleton are obtained, along with lateral s
kull and frontal chest films.



LIMITATIONS:  None.



FINDINGS:  AP CHEST: No bony findings.  Lungs are clear.  Possible widening of the upper mediastinum.


SKULL: No worrisome bone lesions or fractures.

AP BOTH HUMERI: No worrisome bone lesions or fractures.

TWO-VIEW LUMBAR SPINE: No worrisome bone lesions.  Scoliosis.  Degenerative disc disease and spondylo
sis.  No fractures.

TWO-VIEW THORACIC SPINE: Thoracic spondylosis.  No fractures.  No worrisome bone lesions.

AP PELVIS: Impacted fracture of the right femoral neck.

AP BOTH FEMURS: No worrisome bone lesions.  Impacted fracture of the right femoral neck.

OTHER: Internal fixation of prior right ankle fracture.



IMPRESSION:  1.  Acute/subacute impacted fracture of the right femoral neck.

2.  Findings as described.



TECHNICAL DOCUMENTATION:  JOB ID:  [id number]

 2011 [employer]- All Rights Reserved



Reading location - IP/workstation name: [email or web address]

## 2020-08-26 NOTE — PDOC PROGRESS REPORT
Subjective


Progress Note for:: 08/26/20


Subjective:: 


Patient seen by the bedside, there is lateral derangement with ,hypokalemia 

hyperchloremic metabolic acidosis, he is more alert today and oriented





Reason For Visit: 


RECURRENT FALLS,RIGHT FEMORAL FRACTURE,PNEUMONIA,








Physical Exam


Vital Signs: 


                                        











Temp Pulse Resp BP Pulse Ox


 


 97.2 F   53 L  19   142/78 H  99 


 


 08/26/20 12:17  08/26/20 14:00  08/26/20 12:17  08/26/20 12:17  08/26/20 12:17








                                 Intake & Output











 08/25/20 08/26/20 08/27/20





 06:59 06:59 06:59


 


Intake Total 2741 2300 752


 


Output Total 2800 4250 


 


Balance -59 -1950 752


 


Weight 78.6 kg 80.7 kg 











General appearance: PRESENT: no acute distress


Eye exam: PRESENT: PERRLA


Respiratory exam: PRESENT: rhonchi


Cardiovascular exam: PRESENT: +S1, +S2


GI/Abdominal exam: PRESENT: soft


Neurological exam: PRESENT: alert





Results


Laboratory Results: 


                                        





                                 08/25/20 06:05 





                                 08/26/20 06:53 





                                        











  08/26/20





  06:53


 


Sodium  146.8 H


 


Potassium  3.0 L*


 


Chloride  119 H


 


Carbon Dioxide  17 L


 


Anion Gap  11


 


BUN  39 H


 


Creatinine  2.06 H


 


Est GFR (African Amer)  37 L


 


Glucose  162 H


 


Calcium  5.7 L*


 


Magnesium  1.1 L*


 


Total Bilirubin  0.6


 


AST  34


 


Alkaline Phosphatase  60


 


Total Protein  5.8 L


 


Albumin  2.7 L








                                        





08/24/20 00:00   Stool - Stool    - Final


08/24/20 00:00   Stool - Stool   Stool Culture - Final


                            NO SALMONELLA, SHIGELLA, CAMPYLOBACTER, OR E.COLI 

0157


                            RECOVERED.


                            NEGATIVE FOR SHIGA TOXINS 1&2.


08/22/20 13:05   Blood   Blood Culture (PCR) - Final


08/22/20 13:05   Blood   Blood Culture - Final


                            Staphylococcus Aureus


                            Streptococcus Salivarius





                                        











  08/22/20 08/22/20 08/22/20





  11:40 11:40 12:38


 


Creatine Kinase  Cancelled   1513 H


 


Troponin I   Cancelled 


 


NT-Pro-B Natriuret Pep   














  08/22/20 08/22/20 08/22/20





  12:38 12:38 16:55


 


Creatine Kinase   


 


Troponin I  0.122   0.111


 


NT-Pro-B Natriuret Pep   4610 H 











Impressions: 


                                        





Cervical Spine CT  08/22/20 12:18


IMPRESSION:  No acute fracture or dislocation of the cervical spine.  Multilevel

spondylosis, degenerative disc disease, and facet arthropathy.


 








Head CT  08/22/20 12:18


IMPRESSION:  No acute intracranial hemorrhage, mass, or evidence of acute 

territorial infarct.


EVIDENCE OF ACUTE STROKE: NO.


 








Chest CT  08/22/20 12:21


IMPRESSION:


1. Although there is motion in the pelvis, there appears to be an acute impacted

subcapital fracture of the right proximal femur.  Further evaluation with 

dedicated two view radiograph is recommended.


2. Age-indeterminate moderate compression fracture at T12, however this is 

stable since 8/6/2020 radiograph.  This may be subacute or chronic.  No 

retropulsed fracture fragments.


3. Acute appearing fractures of the anterolateral left 8-10th ribs.


4. Patchy areas of consolidation with areas of nodular consolidation in both 

lungs, suggestive of infectious/ inflammatory process.  Given nodular 

components, a follow-up CT of the chest in 3 months is recommended to confirm 

complete resolution.  Commonly reported imaging features of COVID-19  pneumonia 

are present. Other processes such as influenza pneumonia and organizing 

pneumonia, as can be seen with drug toxicity and connective tissue disease, can 

cause a similar imaging pattern.


5. No evidence of acute traumatic solid organ or vascular injury in the chest, 

abdomen or pelvis.


 








Abdomen/Pelvis CT  08/22/20 12:23


IMPRESSION:


1. Although there is motion in the pelvis, there appears to be an acute impacted

subcapital fracture of the right proximal femur.  Further evaluation with 

dedicated two view radiograph is recommended.


2. Age-indeterminate moderate compression fracture at T12, however this is 

stable since 8/6/2020 radiograph.  This may be subacute or chronic.  No 

retropulsed fracture fragments.


3. Acute appearing fractures of the anterolateral left 8-10th ribs.


4. Patchy areas of consolidation with areas of nodular consolidation in both 

lungs, suggestive of infectious/ inflammatory process.  Given nodular 

components, a follow-up CT of the chest in 3 months is recommended to confirm 

complete resolution.  Commonly reported imaging features of COVID-19  pneumonia 

are present. Other processes such as influenza pneumonia and organizing 

pneumonia, as can be seen with drug toxicity and connective tissue disease, can 

cause a similar imaging pattern.


5. No evidence of acute traumatic solid organ or vascular injury in the chest, 

abdomen or pelvis.


 








Hip/Pelvis X-Ray  08/22/20 14:33


IMPRESSION:


1. Acute subcapital fracture right femur.


2. Moderate osteopenia.


 








Skeletal Survey  08/25/20 07:00


IMPRESSION:  1.  Acute/subacute impacted fracture of the right femoral neck.


2.  Findings as described.


 














Assessment & Plan





- Diagnosis


(1) Sepsis


Qualifiers: 


   Sepsis type: methicillin susceptible Staphylococcus aureus   Sepsis acute 

organ dysfunction status: with acute organ dysfunction   Severe sepsis acute 

organ dysfunction type: acute renal failure   Acute renal failure type: with 

acute tubular necrosis   Severe sepsis shock status: without septic shock   

Qualified Code(s): A41.01 - Sepsis due to Methicillin susceptible Staphylococcus

aureus; R65.20 - Severe sepsis without septic shock; N17.0 - Acute kidney 

failure with tubular necrosis   


Is this a current diagnosis for this admission?: Yes   


Plan: 


Patient with staph aureus septicemia, on antibiotic with cefepime he has MSSA








(2) Pneumonia due to COVID-19 virus


Is this a current diagnosis for this admission?: Yes   


Plan: 


Patient will continue remdesivir for 5 days, dexamethasone for 10 days








(3) Staphylococcus aureus septicemia


Is this a current diagnosis for this admission?: Yes   


Plan: 


He has methicillin sensitive staph aureus, MSSA,  continue cefepime








(4) Acute kidney injury


Is this a current diagnosis for this admission?: Yes   


Plan: 


improving 








(5) Multiple fractures of ribs of left side


Qualifiers: 


   Encounter type: initial encounter   Fracture type: closed   Qualified Code(s

): S22.42XA - Multiple fractures of ribs, left side, initial encounter for 

closed fracture   


Is this a current diagnosis for this admission?: Yes   


Plan: 


This is most likely from osteoporosis








(6) Compression fracture of T12 vertebra


Qualifiers: 


   Encounter type: initial encounter   Qualified Code(s): S22.080A - Wedge 

compression fracture of T11-T12 vertebra, initial encounter for closed fracture 

 


Is this a current diagnosis for this admission?: Yes   


Plan: 


This is due to osteoporotic fracture








(7) Pathologic fracture


Qualifiers: 


   Pathology associated with fracture: osteoporosis   Osteoporosis type: age-

related   Site of pathological fracture: vertebra   Encounter type: initial 

encounter   Qualified Code(s): M80.08XA - Age-related osteoporosis with current 

pathological fracture, vertebra(e), initial encounter for fracture   


Is this a current diagnosis for this admission?: Yes   





(8) Hypocalcemia


Is this a current diagnosis for this admission?: Yes   


Plan: 


This is secondary to CKD








(9) Metabolic acidosis


Is this a current diagnosis for this admission?: Yes   





(10) Subcapital fracture of right femur


Qualifiers: 


   Encounter type: initial encounter   Fracture type: closed   Qualified 

Code(s): S72.011A - Unspecified intracapsular fracture of right femur, initial 

encounter for closed fracture   


Is this a current diagnosis for this admission?: Yes   


Plan: 


This is due to osteoporotic fracture








(11) Secondary hyperparathyroidism


Is this a current diagnosis for this admission?: Yes   


Plan: 


He has secondary hyperparathyroidism,Continue, calcitriol ,vitamin D








- Time


Time Spent with patient: 35 or more minutes


Level of Care: IMCU


Medications reviewed and adjusted accordingly: Yes


Anticipated discharge: SNF


Anticipated DC Timeframe: Other

## 2020-08-27 NOTE — PDOC PROGRESS REPORT
Subjective


Progress Note for:: 08/27/20


Subjective:: 





Patient seen by the bedside, there is no new complaints


Reason For Visit: 


RECURRENT FALLS,RIGHT FEMORAL FRACTURE,PNEUMONIA,








Physical Exam


Vital Signs: 


                                        











Temp Pulse Resp BP Pulse Ox


 


 98.3 F   57 L  16   168/80 H  93 


 


 08/27/20 17:06  08/27/20 17:06  08/27/20 17:06  08/27/20 17:06  08/27/20 17:06








                                 Intake & Output











 08/26/20 08/27/20 08/28/20





 06:59 06:59 06:59


 


Intake Total 2300 3752 50


 


Output Total 4250 1500 


 


Balance -1950 2252 50


 


Weight 80.7 kg 80.7 kg 80.7 kg











General appearance: PRESENT: no acute distress


Eye exam: PRESENT: PERRLA


Respiratory exam: PRESENT: clear to auscultation beatrice


Cardiovascular exam: PRESENT: +S1, +S2


GI/Abdominal exam: PRESENT: soft





Results


Laboratory Results: 


                                        





                                 08/27/20 09:36 





                                 08/27/20 09:36 





                                        











  08/26/20 08/27/20 08/27/20





  20:30 09:36 09:36


 


WBC   9.0 


 


RBC   3.83 L 


 


Hgb   11.7 L 


 


Hct   34.8 L 


 


MCV   91 


 


MCH   30.6 


 


MCHC   33.6 


 


RDW   14.3 H 


 


Plt Count   255 


 


Seg Neutrophils %   Not Reportable 


 


Sodium    147.0 H


 


Potassium    2.9 L*


 


Chloride    116 H


 


Carbon Dioxide    23


 


Anion Gap    8


 


BUN    32 H


 


Creatinine    1.56 H


 


Est GFR ( Amer)    51 L


 


Glucose    164 H


 


Calcium    5.7 L*


 


Total Bilirubin    0.5


 


AST    29


 


Alkaline Phosphatase    66


 


Total Protein    6.0 L


 


Albumin    2.8 L


 


Ur 24 Hour Volume  1600  


 


Ur Total Protein 24 Hr  502 H  








                                        





08/22/20 12:38   Blood   Blood Culture - Final


                            NO GROWTH IN 5 DAYS





                                        











  08/22/20 08/22/20 08/22/20





  11:40 11:40 12:38


 


Creatine Kinase  Cancelled   1513 H


 


Troponin I   Cancelled 


 


NT-Pro-B Natriuret Pep   














  08/22/20 08/22/20 08/22/20





  12:38 12:38 16:55


 


Creatine Kinase   


 


Troponin I  0.122   0.111


 


NT-Pro-B Natriuret Pep   4610 H 











Impressions: 


                                        





Cervical Spine CT  08/22/20 12:18


IMPRESSION:  No acute fracture or dislocation of the cervical spine.  Multilevel

spondylosis, degenerative disc disease, and facet arthropathy.


 








Head CT  08/22/20 12:18


IMPRESSION:  No acute intracranial hemorrhage, mass, or evidence of acute t

erritorial infarct.


EVIDENCE OF ACUTE STROKE: NO.


 








Chest CT  08/22/20 12:21


IMPRESSION:


1. Although there is motion in the pelvis, there appears to be an acute impacted

subcapital fracture of the right proximal femur.  Further evaluation with 

dedicated two view radiograph is recommended.


2. Age-indeterminate moderate compression fracture at T12, however this is 

stable since 8/6/2020 radiograph.  This may be subacute or chronic.  No 

retropulsed fracture fragments.


3. Acute appearing fractures of the anterolateral left 8-10th ribs.


4. Patchy areas of consolidation with areas of nodular consolidation in both 

lungs, suggestive of infectious/ inflammatory process.  Given nodular 

components, a follow-up CT of the chest in 3 months is recommended to confirm 

complete resolution.  Commonly reported imaging features of COVID-19  pneumonia 

are present. Other processes such as influenza pneumonia and organizing 

pneumonia, as can be seen with drug toxicity and connective tissue disease, can 

cause a similar imaging pattern.


5. No evidence of acute traumatic solid organ or vascular injury in the chest, 

abdomen or pelvis.


 








Abdomen/Pelvis CT  08/22/20 12:23


IMPRESSION:


1. Although there is motion in the pelvis, there appears to be an acute impacted

subcapital fracture of the right proximal femur.  Further evaluation with 

dedicated two view radiograph is recommended.


2. Age-indeterminate moderate compression fracture at T12, however this is 

stable since 8/6/2020 radiograph.  This may be subacute or chronic.  No 

retropulsed fracture fragments.


3. Acute appearing fractures of the anterolateral left 8-10th ribs.


4. Patchy areas of consolidation with areas of nodular consolidation in both 

lungs, suggestive of infectious/ inflammatory process.  Given nodular 

components, a follow-up CT of the chest in 3 months is recommended to confirm 

complete resolution.  Commonly reported imaging features of COVID-19  pneumonia 

are present. Other processes such as influenza pneumonia and organizing 

pneumonia, as can be seen with drug toxicity and connective tissue disease, can 

cause a similar imaging pattern.


5. No evidence of acute traumatic solid organ or vascular injury in the chest, 

abdomen or pelvis.


 








Hip/Pelvis X-Ray  08/22/20 14:33


IMPRESSION:


1. Acute subcapital fracture right femur.


2. Moderate osteopenia.


 








Skeletal Survey  08/25/20 07:00


IMPRESSION:  1.  Acute/subacute impacted fracture of the right femoral neck.


2.  Findings as described.


 














Assessment & Plan





- Diagnosis


(1) Sepsis


Qualifiers: 


   Sepsis type: methicillin susceptible Staphylococcus aureus   Sepsis acute 

organ dysfunction status: with acute organ dysfunction   Severe sepsis acute or

bharat dysfunction type: acute renal failure   Acute renal failure type: with acute

tubular necrosis   Severe sepsis shock status: without septic shock   Qualified 

Code(s): A41.01 - Sepsis due to Methicillin susceptible Staphylococcus aureus; 

R65.20 - Severe sepsis without septic shock; N17.0 - Acute kidney failure with 

tubular necrosis   


Is this a current diagnosis for this admission?: Yes   


Plan: 


Patient with staph aureus septicemia, on antibiotic with cefepime he has MSSA








(2) Pneumonia due to COVID-19 virus


Is this a current diagnosis for this admission?: Yes   


Plan: 


Patient will continue remdesivir for 5 days, dexamethasone for 10 days








(3) Staphylococcus aureus septicemia


Is this a current diagnosis for this admission?: Yes   


Plan: 


He has methicillin sensitive staph aureus, MSSA,  continue cefepime








(4) Acute kidney injury


Is this a current diagnosis for this admission?: Yes   


Plan: 


improving 








(5) Multiple fractures of ribs of left side


Qualifiers: 


   Encounter type: initial encounter   Fracture type: closed   Qualified 

Code(s): S22.42XA - Multiple fractures of ribs, left side, initial encounter for

closed fracture   


Is this a current diagnosis for this admission?: Yes   


Plan: 


This is most likely from osteoporosis








(6) Compression fracture of T12 vertebra


Qualifiers: 


   Encounter type: initial encounter   Qualified Code(s): S22.080A - Wedge 

compression fracture of T11-T12 vertebra, initial encounter for closed fracture 

 


Is this a current diagnosis for this admission?: Yes   


Plan: 


This is due to osteoporotic fracture








(7) Pathologic fracture


Qualifiers: 


   Pathology associated with fracture: osteoporosis   Osteoporosis type: age-

related   Site of pathological fracture: vertebra   Encounter type: initial 

encounter   Qualified Code(s): M80.08XA - Age-related osteoporosis with current 

pathological fracture, vertebra(e), initial encounter for fracture   


Is this a current diagnosis for this admission?: Yes   





(8) Hypocalcemia


Is this a current diagnosis for this admission?: Yes   


Plan: 


This is secondary to CKD








(9) Metabolic acidosis


Is this a current diagnosis for this admission?: Yes   





(10) Subcapital fracture of right femur


Qualifiers: 


   Encounter type: initial encounter   Fracture type: closed   Qualified Co

de(s): S72.011A - Unspecified intracapsular fracture of right femur, initial 

encounter for closed fracture   


Is this a current diagnosis for this admission?: Yes   


Plan: 


This is due to osteoporotic fracture








(11) Secondary hyperparathyroidism


Is this a current diagnosis for this admission?: Yes   


Plan: 


He has secondary hyperparathyroidism,Continue, calcitriol ,vitamin D








- Time


Time Spent with patient: 35 or more minutes


Level of Care: IMCU


Medications reviewed and adjusted accordingly: Yes


Anticipated discharge: SNF


Anticipated DC Timeframe: when bed available





- Inpatient Certification


Based on my medical assessment, after consideration of the patient's 

comorbidities, presenting symptoms, or acuity I expect that the services needed 

warrant INPATIENT care.: Yes


I certify that my determination is in accordance with my understanding of 

Medicare's requirements for reasonable and necessary INPATIENT services [42 CFR 

412.3e].: Yes

## 2020-08-28 NOTE — PDOC PROGRESS REPORT
Subjective


Progress Note for:: 08/28/20


Subjective:: 





Patient seen by the bedside,Patient is improving still somewhat confused, he had

a video conference with the spouse today.


Reason For Visit: 


RECURRENT FALLS,RIGHT FEMORAL FRACTURE,PNEUMONIA,








Physical Exam


Vital Signs: 


                                        











Temp Pulse Resp BP Pulse Ox


 


 97.4 F   68   16   163/75 H  97 


 


 08/28/20 15:37  08/28/20 15:37  08/28/20 15:37  08/28/20 15:37  08/28/20 15:37








                                 Intake & Output











 08/27/20 08/28/20 08/29/20





 06:59 06:59 06:59


 


Intake Total 4002 1665 1050


 


Output Total 1500 3575 


 


Balance 2502 -1910 1050


 


Weight 80.7 kg 78 kg 











General appearance: PRESENT: no acute distress


Eye exam: PRESENT: PERRLA


Respiratory exam: PRESENT: clear to auscultation beatrice


Cardiovascular exam: PRESENT: +S1, +S2


GI/Abdominal exam: PRESENT: soft


Neurological exam: PRESENT: alert





Results


Laboratory Results: 


                                        





                                 08/28/20 03:50 





                                 08/28/20 03:50 





                                        











  08/28/20 08/28/20





  03:50 03:50


 


WBC  10.4 


 


RBC  3.60 L 


 


Hgb  11.3 L 


 


Hct  32.8 L 


 


MCV  91 


 


MCH  31.2 


 


MCHC  34.3 


 


RDW  14.3 H 


 


Plt Count  252 


 


Seg Neutrophils %  89.0 H 


 


Sodium   147.4 H


 


Potassium   4.1  D


 


Chloride   117 H


 


Carbon Dioxide   22


 


Anion Gap   8


 


BUN   31 H


 


Creatinine   1.48 H


 


Est GFR (African Amer)   54 L


 


Glucose   164 H


 


Calcium   5.5 L*


 


Total Bilirubin   0.5


 


AST   35


 


Alkaline Phosphatase   69


 


Total Protein   5.7 L


 


Albumin   2.6 L








                                        











  08/22/20 08/22/20 08/22/20





  11:40 11:40 12:38


 


Creatine Kinase  Cancelled   1513 H


 


Troponin I   Cancelled 


 


NT-Pro-B Natriuret Pep   














  08/22/20 08/22/20 08/22/20





  12:38 12:38 16:55


 


Creatine Kinase   


 


Troponin I  0.122   0.111


 


NT-Pro-B Natriuret Pep   4610 H 











Impressions: 


                                        





Cervical Spine CT  08/22/20 12:18


IMPRESSION:  No acute fracture or dislocation of the cervical spine.  Multilevel

spondylosis, degenerative disc disease, and facet arthropathy.


 








Head CT  08/22/20 12:18


IMPRESSION:  No acute intracranial hemorrhage, mass, or evidence of acute 

territorial infarct.


EVIDENCE OF ACUTE STROKE: NO.


 








Chest CT  08/22/20 12:21


IMPRESSION:


1. Although there is motion in the pelvis, there appears to be an acute impacted

subcapital fracture of the right proximal femur.  Further evaluation with 

dedicated two view radiograph is recommended.


2. Age-indeterminate moderate compression fracture at T12, however this is 

stable since 8/6/2020 radiograph.  This may be subacute or chronic.  No 

retropulsed fracture fragments.


3. Acute appearing fractures of the anterolateral left 8-10th ribs.


4. Patchy areas of consolidation with areas of nodular consolidation in both 

lungs, suggestive of infectious/ inflammatory process.  Given nodular 

components, a follow-up CT of the chest in 3 months is recommended to confirm 

complete resolution.  Commonly reported imaging features of COVID-19  pneumonia 

are present. Other processes such as influenza pneumonia and organizing 

pneumonia, as can be seen with drug toxicity and connective tissue disease, can 

cause a similar imaging pattern.


5. No evidence of acute traumatic solid organ or vascular injury in the chest, 

abdomen or pelvis.


 








Abdomen/Pelvis CT  08/22/20 12:23


IMPRESSION:


1. Although there is motion in the pelvis, there appears to be an acute impacted

subcapital fracture of the right proximal femur.  Further evaluation with dedica

princess two view radiograph is recommended.


2. Age-indeterminate moderate compression fracture at T12, however this is 

stable since 8/6/2020 radiograph.  This may be subacute or chronic.  No 

retropulsed fracture fragments.


3. Acute appearing fractures of the anterolateral left 8-10th ribs.


4. Patchy areas of consolidation with areas of nodular consolidation in both 

lungs, suggestive of infectious/ inflammatory process.  Given nodular 

components, a follow-up CT of the chest in 3 months is recommended to confirm 

complete resolution.  Commonly reported imaging features of COVID-19  pneumonia 

are present. Other processes such as influenza pneumonia and organizing 

pneumonia, as can be seen with drug toxicity and connective tissue disease, can 

cause a similar imaging pattern.


5. No evidence of acute traumatic solid organ or vascular injury in the chest, 

abdomen or pelvis.


 








Hip/Pelvis X-Ray  08/22/20 14:33


IMPRESSION:


1. Acute subcapital fracture right femur.


2. Moderate osteopenia.


 








Skeletal Survey  08/25/20 07:00


IMPRESSION:  1.  Acute/subacute impacted fracture of the right femoral neck.


2.  Findings as described.


 














Assessment & Plan





- Diagnosis


(1) Sepsis


Qualifiers: 


   Sepsis type: methicillin susceptible Staphylococcus aureus   Sepsis acute 

organ dysfunction status: with acute organ dysfunction   Severe sepsis acute 

organ dysfunction type: acute renal failure   Acute renal failure type: with 

acute tubular necrosis   Severe sepsis shock status: without septic shock   

Qualified Code(s): A41.01 - Sepsis due to Methicillin susceptible Staphylococcus

aureus; R65.20 - Severe sepsis without septic shock; N17.0 - Acute kidney 

failure with tubular necrosis   


Is this a current diagnosis for this admission?: Yes   


Plan: 


Patient with staph aureus septicemia, on antibiotic with cefepime he has MSSA








(2) Pneumonia due to COVID-19 virus


Is this a current diagnosis for this admission?: Yes   


Plan: 


Patient will continue remdesivir for 5 days, dexamethasone for 10 days








(3) Staphylococcus aureus septicemia


Is this a current diagnosis for this admission?: Yes   


Plan: 


He has methicillin sensitive staph aureus, MSSA,  continue cefepime








(4) Acute kidney injury


Is this a current diagnosis for this admission?: Yes   


Plan: 


improving 








(5) Multiple fractures of ribs of left side


Qualifiers: 


   Encounter type: initial encounter   Fracture type: closed   Qualified 

Code(s): S22.42XA - Multiple fractures of ribs, left side, initial encounter for

closed fracture   


Is this a current diagnosis for this admission?: Yes   


Plan: 


This is most likely from osteoporosis








(6) Compression fracture of T12 vertebra


Qualifiers: 


   Encounter type: initial encounter   Qualified Code(s): S22.080A - Wedge 

compression fracture of T11-T12 vertebra, initial encounter for closed fracture 

 


Is this a current diagnosis for this admission?: Yes   


Plan: 


This is due to osteoporotic fracture








(7) Pathologic fracture


Qualifiers: 


   Pathology associated with fracture: osteoporosis   Osteoporosis type: age-

related   Site of pathological fracture: vertebra   Encounter type: initial 

encounter   Qualified Code(s): M80.08XA - Age-related osteoporosis with current 

pathological fracture, vertebra(e), initial encounter for fracture   


Is this a current diagnosis for this admission?: Yes   





(8) Hypocalcemia


Is this a current diagnosis for this admission?: Yes   


Plan: 


This is secondary to CKD








(9) Metabolic acidosis


Is this a current diagnosis for this admission?: Yes   





(10) Subcapital fracture of right femur


Qualifiers: 


   Encounter type: initial encounter   Fracture type: closed   Qualified 

Code(s): S72.011A - Unspecified intracapsular fracture of right femur, initial 

encounter for closed fracture   


Is this a current diagnosis for this admission?: Yes   


Plan: 


This is due to osteoporotic fracture








(11) Secondary hyperparathyroidism


Is this a current diagnosis for this admission?: Yes   


Plan: 


He has secondary hyperparathyroidism,Continue, calcitriol ,vitamin D








- Time


Time Spent with patient: 25-34 minutes


Level of Care: IMCU


Medications reviewed and adjusted accordingly: Yes


Anticipated discharge: SNF


Anticipated DC Timeframe: when bed available

## 2020-08-29 NOTE — PDOC PROGRESS REPORT
Subjective


Progress Note for:: 08/29/20


Subjective:: 





Patient seen by the bedside, I spoke to his wife to update about patient's 

condition, I indicated that patient will be in the hospital for at least 10 

days, after that, he will be seen by orthopedic for ORIF of the fracture hip 

joint and then transferred to rehabilitation.The nursing staff upset because 

patient is in sinus bradycardia, he is on metoprolol for rate control blood 

pressure, sinus bradycardia is expected effects of beta-blocker provided blood 

pressure is maintained there is no need for panic.  The systolic blood pressures

actually was 170, there is no indication not to administer metoprolol because of

bradycardia especially when the blood pressure is severely elevated.  The urine 

electrophoresis was positive for M spike suggesting multiple myeloma, I will 

request for serum protein electrophoresis


Reason For Visit: 


RECURRENT FALLS,RIGHT FEMORAL FRACTURE,PNEUMONIA,








Physical Exam


Vital Signs: 


                                        











Temp Pulse Resp BP Pulse Ox


 


 97.7 F   63   13   172/81 H  94 


 


 08/29/20 10:00  08/29/20 08:03  08/29/20 08:03  08/29/20 08:03  08/29/20 08:03








                                 Intake & Output











 08/28/20 08/29/20 08/30/20





 06:59 06:59 06:59


 


Intake Total 1665 2300 150


 


Output Total 3575 1900 


 


Balance -1910 400 150


 


Weight 78 kg 78 kg 78 kg











General appearance: PRESENT: no acute distress


Eye exam: PRESENT: PERRLA


Respiratory exam: PRESENT: clear to auscultation beatrice


Cardiovascular exam: PRESENT: +S1, +S2


GI/Abdominal exam: PRESENT: soft


Neurological exam: PRESENT: alert





Results


Laboratory Results: 


                                        





                                 08/29/20 02:31 





                                 08/29/20 02:31 





                                        











  08/29/20 08/29/20 08/29/20





  02:31 02:31 02:31


 


WBC  9.4  


 


RBC  3.88 L  


 


Hgb  11.9 L  


 


Hct  35.5 L  


 


MCV  91  


 


MCH  30.6  


 


MCHC  33.5  


 


RDW  14.2 H  


 


Plt Count  259  


 


Sodium   145.7 H 


 


Potassium   4.0 


 


Chloride   116 H 


 


Carbon Dioxide   22 


 


Anion Gap   8 


 


BUN   31 H 


 


Creatinine   1.21 


 


Est GFR ( Amer)   > 60 


 


Glucose   188 H 


 


Lactic Acid    0.9


 


Calcium   5.5 L* 


 


Magnesium   1.1 L* 








                                        











  08/22/20 08/22/20 08/22/20





  11:40 11:40 12:38


 


Creatine Kinase  Cancelled   1513 H


 


CK-MB (CK-2)   


 


Troponin I   Cancelled 


 


NT-Pro-B Natriuret Pep   














  08/22/20 08/22/20 08/22/20





  12:38 12:38 16:55


 


Creatine Kinase   


 


CK-MB (CK-2)   


 


Troponin I  0.122   0.111


 


NT-Pro-B Natriuret Pep   4610 H 














  08/29/20 08/29/20





  02:31 02:31


 


Creatine Kinase  106 


 


CK-MB (CK-2)   2.04


 


Troponin I   0.013


 


NT-Pro-B Natriuret Pep  











Impressions: 


                                        





Cervical Spine CT  08/22/20 12:18


IMPRESSION:  No acute fracture or dislocation of the cervical spine.  Multilevel

spondylosis, degenerative disc disease, and facet arthropathy.


 








Head CT  08/22/20 12:18


IMPRESSION:  No acute intracranial hemorrhage, mass, or evidence of acute 

territorial infarct.


EVIDENCE OF ACUTE STROKE: NO.


 








Chest CT  08/22/20 12:21


IMPRESSION:


1. Although there is motion in the pelvis, there appears to be an acute impacted

subcapital fracture of the right proximal femur.  Further evaluation with 

dedicated two view radiograph is recommended.


2. Age-indeterminate moderate compression fracture at T12, however this is 

stable since 8/6/2020 radiograph.  This may be subacute or chronic.  No 

retropulsed fracture fragments.


3. Acute appearing fractures of the anterolateral left 8-10th ribs.


4. Patchy areas of consolidation with areas of nodular consolidation in both 

lungs, suggestive of infectious/ inflammatory process.  Given nodular 

components, a follow-up CT of the chest in 3 months is recommended to confirm 

complete resolution.  Commonly reported imaging features of COVID-19  pneumonia 

are present. Other processes such as influenza pneumonia and organizing 

pneumonia, as can be seen with drug toxicity and connective tissue disease, can 

cause a similar imaging pattern.


5. No evidence of acute traumatic solid organ or vascular injury in the chest, 

abdomen or pelvis.


 








Abdomen/Pelvis CT  08/22/20 12:23


IMPRESSION:


1. Although there is motion in the pelvis, there appears to be an acute impacted

subcapital fracture of the right proximal femur.  Further evaluation with de

dicated two view radiograph is recommended.


2. Age-indeterminate moderate compression fracture at T12, however this is 

stable since 8/6/2020 radiograph.  This may be subacute or chronic.  No 

retropulsed fracture fragments.


3. Acute appearing fractures of the anterolateral left 8-10th ribs.


4. Patchy areas of consolidation with areas of nodular consolidation in both 

lungs, suggestive of infectious/ inflammatory process.  Given nodular 

components, a follow-up CT of the chest in 3 months is recommended to confirm 

complete resolution.  Commonly reported imaging features of COVID-19  pneumonia 

are present. Other processes such as influenza pneumonia and organizing 

pneumonia, as can be seen with drug toxicity and connective tissue disease, can 

cause a similar imaging pattern.


5. No evidence of acute traumatic solid organ or vascular injury in the chest, 

abdomen or pelvis.


 








Hip/Pelvis X-Ray  08/22/20 14:33


IMPRESSION:


1. Acute subcapital fracture right femur.


2. Moderate osteopenia.


 








Skeletal Survey  08/25/20 07:00


IMPRESSION:  1.  Acute/subacute impacted fracture of the right femoral neck.


2.  Findings as described.


 














Assessment & Plan





- Diagnosis


(1) Sepsis


Qualifiers: 


   Sepsis type: methicillin susceptible Staphylococcus aureus   Sepsis acute 

organ dysfunction status: with acute organ dysfunction   Severe sepsis acute 

organ dysfunction type: acute renal failure   Acute renal failure type: with 

acute tubular necrosis   Severe sepsis shock status: without septic shock   

Qualified Code(s): A41.01 - Sepsis due to Methicillin susceptible Staphylococcus

aureus; R65.20 - Severe sepsis without septic shock; N17.0 - Acute kidney 

failure with tubular necrosis   


Is this a current diagnosis for this admission?: Yes   


Plan: 


Patient with staph aureus septicemia, on antibiotic with cefepime he has MSSA








(2) Pneumonia due to COVID-19 virus


Is this a current diagnosis for this admission?: Yes   


Plan: 


Patient will continue remdesivir for 5 days, dexamethasone for 10 days








(3) Staphylococcus aureus septicemia


Is this a current diagnosis for this admission?: Yes   


Plan: 


He has methicillin sensitive staph aureus, MSSA,  continue cefepime








(4) Acute kidney injury


Is this a current diagnosis for this admission?: Yes   


Plan: 


improving 








(5) Multiple fractures of ribs of left side


Qualifiers: 


   Encounter type: initial encounter   Fracture type: closed   Qualified 

Code(s): S22.42XA - Multiple fractures of ribs, left side, initial encounter for

closed fracture   


Is this a current diagnosis for this admission?: Yes   


Plan: 


This is most likely from osteoporosis








(6) Compression fracture of T12 vertebra


Qualifiers: 


   Encounter type: initial encounter   Qualified Code(s): S22.080A - Wedge 

compression fracture of T11-T12 vertebra, initial encounter for closed fracture 

 


Is this a current diagnosis for this admission?: Yes   


Plan: 


This is due to osteoporotic fracture








(7) Pathologic fracture


Qualifiers: 


   Pathology associated with fracture: osteoporosis   Osteoporosis type: age-

related   Site of pathological fracture: vertebra   Encounter type: initial 

encounter   Qualified Code(s): M80.08XA - Age-related osteoporosis with current 

pathological fracture, vertebra(e), initial encounter for fracture   


Is this a current diagnosis for this admission?: Yes   





(8) Hypocalcemia


Is this a current diagnosis for this admission?: Yes   


Plan: 


This is secondary to CKD








(9) Metabolic acidosis


Is this a current diagnosis for this admission?: Yes   





(10) Subcapital fracture of right femur


Qualifiers: 


   Encounter type: initial encounter   Fracture type: closed   Qualified 

Code(s): S72.011A - Unspecified intracapsular fracture of right femur, initial 

encounter for closed fracture   


Is this a current diagnosis for this admission?: Yes   


Plan: 


This is due to osteoporotic fracture








(11) Secondary hyperparathyroidism


Is this a current diagnosis for this admission?: Yes   


Plan: 


He has secondary hyperparathyroidism,Continue, calcitriol ,vitamin D








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


Is this a current diagnosis for this admission?: Yes   


Plan: 


The urine electrophoresis was positive for M spike suggesting multiple myeloma, 

we will request for serum protein electrophoresis








- Time


Time Spent with patient: 35 or more minutes


Level of Care: IMCU


Medications reviewed and adjusted accordingly: Yes


Anticipated discharge: SNF


Anticipated DC Timeframe: Other

## 2020-08-30 NOTE — RADIOLOGY REPORT (SQ)
EXAM DESCRIPTION:  CHEST SINGLE VIEW



IMAGES COMPLETED DATE/TIME:  8/30/2020 11:52 am



REASON FOR STUDY:  pneumonia



COMPARISON:  Chest radiograph 8/6/2020



NUMBER OF VIEWS:  One view.



TECHNIQUE:  Single frontal radiographic view of the chest acquired.



LIMITATIONS:  None.



FINDINGS:  LUNGS AND PLEURA: Interval development of patchy opacity at the left lung base obscuring t
he left hemidiaphragm.

MEDIASTINUM AND HILAR STRUCTURES: Similar widened mediastinum, unchanged dating back to 2013.

HEART AND VASCULAR STRUCTURES: Heart normal in size.  Normal vasculature.

BONES: No acute findings.

HARDWARE: None in the chest.

OTHER: No other significant finding.



IMPRESSION:  Interval development of patchy opacity at the left lung base obscuring the left hemidiap
hragm suspicious for infection.  Recommend radiographic follow-up after appropriate treatment interva
l to document resolution.



TECHNICAL DOCUMENTATION:  JOB ID:  [id number]

 2011 [employer]- All Rights Reserved



Reading location - IP/workstation name: [email or web address]

## 2020-08-30 NOTE — PDOC PROGRESS REPORT
Subjective


Progress Note for:: 08/30/20


Subjective:: 





Patient somewhat confused today, started back on Effexor


Reason For Visit: 


RECURRENT FALLS,RIGHT FEMORAL FRACTURE,PNEUMONIA,








Physical Exam


Vital Signs: 


                                        











Temp Pulse Resp BP Pulse Ox


 


 97.6 F   61   19   180/75 H  92 


 


 08/30/20 08:00  08/30/20 08:00  08/30/20 08:00  08/30/20 08:00  08/30/20 08:00








                                 Intake & Output











 08/29/20 08/30/20 08/31/20





 06:59 06:59 06:59


 


Intake Total 2300 3590 1050


 


Output Total 1900 2700 600


 


Balance 400 890 450


 


Weight 78 kg 80.2 kg 











General appearance: PRESENT: no acute distress


Eye exam: PRESENT: PERRLA


Respiratory exam: PRESENT: clear to auscultation beatrice


Cardiovascular exam: PRESENT: +S1, +S2





Results


Laboratory Results: 


                                        





                                 08/30/20 06:01 





                                 08/30/20 06:01 





                                        











  08/30/20 08/30/20 08/30/20





  06:01 06:01 06:44


 


WBC  12.8 H  


 


RBC  3.60 L  


 


Hgb  11.2 L  


 


Hct  33.6 L  


 


MCV  93  


 


MCH  31.0  


 


MCHC  33.3  


 


RDW  14.3 H  


 


Plt Count  210  


 


Seg Neutrophils %  86.7 H  


 


Sodium   141.5 


 


Potassium   4.4 


 


Chloride   111 H 


 


Carbon Dioxide   22 


 


Anion Gap   9 


 


BUN   32 H 


 


Creatinine   1.13 


 


Est GFR ( Amer)   > 60 


 


Glucose   160 H 


 


Calcium   5.8 L* 


 


Magnesium    1.4 L


 


Total Bilirubin   0.7 


 


AST   35 


 


Alkaline Phosphatase   53 


 


Total Protein   5.4 L 


 


Albumin   2.6 L 








                                        











  08/22/20 08/22/20 08/22/20





  11:40 11:40 12:38


 


Creatine Kinase  Cancelled   1513 H


 


CK-MB (CK-2)   


 


Troponin I   Cancelled 


 


NT-Pro-B Natriuret Pep   














  08/22/20 08/22/20 08/22/20





  12:38 12:38 16:55


 


Creatine Kinase   


 


CK-MB (CK-2)   


 


Troponin I  0.122   0.111


 


NT-Pro-B Natriuret Pep   4610 H 














  08/29/20 08/29/20





  02:31 02:31


 


Creatine Kinase  106 


 


CK-MB (CK-2)   2.04


 


Troponin I   0.013


 


NT-Pro-B Natriuret Pep  











Impressions: 


                                        





Cervical Spine CT  08/22/20 12:18


IMPRESSION:  No acute fracture or dislocation of the cervical spine.  Multilevel

spondylosis, degenerative disc disease, and facet arthropathy.


 








Head CT  08/22/20 12:18


IMPRESSION:  No acute intracranial hemorrhage, mass, or evidence of acute 

territorial infarct.


EVIDENCE OF ACUTE STROKE: NO.


 








Chest CT  08/22/20 12:21


IMPRESSION:


1. Although there is motion in the pelvis, there appears to be an acute impacted

subcapital fracture of the right proximal femur.  Further evaluation with 

dedicated two view radiograph is recommended.


2. Age-indeterminate moderate compression fracture at T12, however this is 

stable since 8/6/2020 radiograph.  This may be subacute or chronic.  No 

retropulsed fracture fragments.


3. Acute appearing fractures of the anterolateral left 8-10th ribs.


4. Patchy areas of consolidation with areas of nodular consolidation in both 

lungs, suggestive of infectious/ inflammatory process.  Given nodular 

components, a follow-up CT of the chest in 3 months is recommended to confirm 

complete resolution.  Commonly reported imaging features of COVID-19  pneumonia 

are present. Other processes such as influenza pneumonia and organizing pneumo

queenie, as can be seen with drug toxicity and connective tissue disease, can cause 

a similar imaging pattern.


5. No evidence of acute traumatic solid organ or vascular injury in the chest, 

abdomen or pelvis.


 








Abdomen/Pelvis CT  08/22/20 12:23


IMPRESSION:


1. Although there is motion in the pelvis, there appears to be an acute impacted

subcapital fracture of the right proximal femur.  Further evaluation with 

dedicated two view radiograph is recommended.


2. Age-indeterminate moderate compression fracture at T12, however this is 

stable since 8/6/2020 radiograph.  This may be subacute or chronic.  No 

retropulsed fracture fragments.


3. Acute appearing fractures of the anterolateral left 8-10th ribs.


4. Patchy areas of consolidation with areas of nodular consolidation in both 

lungs, suggestive of infectious/ inflammatory process.  Given nodular 

components, a follow-up CT of the chest in 3 months is recommended to confirm 

complete resolution.  Commonly reported imaging features of COVID-19  pneumonia 

are present. Other processes such as influenza pneumonia and organizing 

pneumonia, as can be seen with drug toxicity and connective tissue disease, can 

cause a similar imaging pattern.


5. No evidence of acute traumatic solid organ or vascular injury in the chest, 

abdomen or pelvis.


 








Hip/Pelvis X-Ray  08/22/20 14:33


IMPRESSION:


1. Acute subcapital fracture right femur.


2. Moderate osteopenia.


 








Skeletal Survey  08/25/20 07:00


IMPRESSION:  1.  Acute/subacute impacted fracture of the right femoral neck.


2.  Findings as described.


 








Chest X-Ray  08/30/20 00:00


IMPRESSION:  Interval development of patchy opacity at the left lung base 

obscuring the left hemidiaphragm suspicious for infection.  Recommend 

radiographic follow-up after appropriate treatment interval to document resolu

tion.


 














Assessment & Plan





- Diagnosis


(1) Sepsis


Qualifiers: 


   Sepsis type: methicillin susceptible Staphylococcus aureus   Sepsis acute 

organ dysfunction status: with acute organ dysfunction   Severe sepsis acute 

organ dysfunction type: acute renal failure   Acute renal failure type: with 

acute tubular necrosis   Severe sepsis shock status: without septic shock   

Qualified Code(s): A41.01 - Sepsis due to Methicillin susceptible Staphylococcus

aureus; R65.20 - Severe sepsis without septic shock; N17.0 - Acute kidney 

failure with tubular necrosis   


Is this a current diagnosis for this admission?: Yes   


Plan: 


Patient with staph aureus septicemia, on antibiotic with cefepime he has MSSA








(2) Pneumonia due to COVID-19 virus


Is this a current diagnosis for this admission?: Yes   





(3) Staphylococcus aureus septicemia


Is this a current diagnosis for this admission?: Yes   


Plan: 


He has methicillin sensitive staph aureus, MSSA,  continue cefepime








(4) Acute kidney injury


Is this a current diagnosis for this admission?: Yes   


Plan: 


improving 








(5) Multiple fractures of ribs of left side


Qualifiers: 


   Encounter type: initial encounter   Fracture type: closed   Qualified 

Code(s): S22.42XA - Multiple fractures of ribs, left side, initial encounter for

closed fracture   


Is this a current diagnosis for this admission?: Yes   


Plan: 


This is most likely from osteoporosis








(6) Compression fracture of T12 vertebra


Qualifiers: 


   Encounter type: initial encounter   Qualified Code(s): S22.080A - Wedge 

compression fracture of T11-T12 vertebra, initial encounter for closed fracture 

 


Is this a current diagnosis for this admission?: Yes   


Plan: 


This is due to osteoporotic fracture








(7) Pathologic fracture


Qualifiers: 


   Pathology associated with fracture: osteoporosis   Osteoporosis type: age-

related   Site of pathological fracture: vertebra   Encounter type: initial 

encounter   Qualified Code(s): M80.08XA - Age-related osteoporosis with current 

pathological fracture, vertebra(e), initial encounter for fracture   


Is this a current diagnosis for this admission?: Yes   





(8) Hypocalcemia


Is this a current diagnosis for this admission?: Yes   


Plan: 


This is secondary to CKD








(9) Metabolic acidosis


Is this a current diagnosis for this admission?: Yes   





(10) Subcapital fracture of right femur


Qualifiers: 


   Encounter type: initial encounter   Fracture type: closed   Qualified 

Code(s): S72.011A - Unspecified intracapsular fracture of right femur, initial 

encounter for closed fracture   


Is this a current diagnosis for this admission?: Yes   


Plan: 


This is due to osteoporotic fracture








(11) Secondary hyperparathyroidism


Is this a current diagnosis for this admission?: Yes   


Plan: 


He has secondary hyperparathyroidism,Continue, calcitriol ,vitamin D








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


Is this a current diagnosis for this admission?: Yes   





- Time


Time Spent with patient: 25-34 minutes


Level of Care: IMCU


Medications reviewed and adjusted accordingly: Yes


Anticipated discharge: Home, SNF


Anticipated DC Timeframe: when bed available

## 2020-08-31 NOTE — PDOC PROGRESS REPORT
Subjective


Progress Note for:: 08/31/20


Subjective:: 





Patient seen by the bedside


Reason For Visit: 


RECURRENT FALLS,RIGHT FEMORAL FRACTURE,PNEUMONIA,








Physical Exam


Vital Signs: 


                                        











Temp Pulse Resp BP Pulse Ox


 


 97.9 F   63   18   133/64 H  97 


 


 08/31/20 12:08  08/31/20 19:00  08/31/20 12:08  08/31/20 12:08  08/31/20 12:08








                                 Intake & Output











 08/30/20 08/31/20 09/01/20





 06:59 06:59 06:59


 


Intake Total 3590 1150 822


 


Output Total 2700 1500 400


 


Balance 890 -350 422


 


Weight 80.2 kg 79.6 kg 











General appearance: PRESENT: no acute distress


Neurological exam: PRESENT: alert, CN II-XII grossly intact





Results


Laboratory Results: 


                                        





                                 08/31/20 10:01 





                                 08/31/20 10:01 





                                        











  08/31/20 08/31/20





  10:01 10:01


 


WBC  13.3 H 


 


RBC  3.84 L 


 


Hgb  11.7 L 


 


Hct  35.1 L 


 


MCV  91 


 


MCH  30.5 


 


MCHC  33.4 


 


RDW  14.0 


 


Plt Count  224 


 


Seg Neutrophils %  Not Reportable 


 


Sodium   140.4


 


Potassium   4.2


 


Chloride   107


 


Carbon Dioxide   27


 


Anion Gap   6


 


BUN   31 H


 


Creatinine   1.04


 


Est GFR (African Amer)   > 60


 


Glucose   104


 


Calcium   5.9 L*


 


Total Bilirubin   0.8


 


AST   41


 


Alkaline Phosphatase   71


 


Total Protein   5.3 L


 


Albumin   2.7 L








                                        











  08/22/20 08/22/20 08/22/20





  11:40 11:40 12:38


 


Creatine Kinase  Cancelled   1513 H


 


CK-MB (CK-2)   


 


Troponin I   Cancelled 


 


NT-Pro-B Natriuret Pep   














  08/22/20 08/22/20 08/22/20





  12:38 12:38 16:55


 


Creatine Kinase   


 


CK-MB (CK-2)   


 


Troponin I  0.122   0.111


 


NT-Pro-B Natriuret Pep   4610 H 














  08/29/20 08/29/20





  02:31 02:31


 


Creatine Kinase  106 


 


CK-MB (CK-2)   2.04


 


Troponin I   0.013


 


NT-Pro-B Natriuret Pep  











Impressions: 


                                        





Cervical Spine CT  08/22/20 12:18


IMPRESSION:  No acute fracture or dislocation of the cervical spine.  Multilevel

spondylosis, degenerative disc disease, and facet arthropathy.


 








Head CT  08/22/20 12:18


IMPRESSION:  No acute intracranial hemorrhage, mass, or evidence of acute 

territorial infarct.


EVIDENCE OF ACUTE STROKE: NO.


 








Chest CT  08/22/20 12:21


IMPRESSION:


1. Although there is motion in the pelvis, there appears to be an acute impacted

subcapital fracture of the right proximal femur.  Further evaluation with 

dedicated two view radiograph is recommended.


2. Age-indeterminate moderate compression fracture at T12, however this is 

stable since 8/6/2020 radiograph.  This may be subacute or chronic.  No 

retropulsed fracture fragments.


3. Acute appearing fractures of the anterolateral left 8-10th ribs.


4. Patchy areas of consolidation with areas of nodular consolidation in both 

lungs, suggestive of infectious/ inflammatory process.  Given nodular 

components, a follow-up CT of the chest in 3 months is recommended to confirm 

complete resolution.  Commonly reported imaging features of COVID-19  pneumonia 

are present. Other processes such as influenza pneumonia and organizing 

pneumonia, as can be seen with drug toxicity and connective tissue disease, can 

cause a similar imaging pattern.


5. No evidence of acute traumatic solid organ or vascular injury in the chest, 

abdomen or pelvis.


 








Abdomen/Pelvis CT  08/22/20 12:23


IMPRESSION:


1. Although there is motion in the pelvis, there appears to be an acute impacted

subcapital fracture of the right proximal femur.  Further evaluation with 

dedicated two view radiograph is recommended.


2. Age-indeterminate moderate compression fracture at T12, however this is 

stable since 8/6/2020 radiograph.  This may be subacute or chronic.  No 

retropulsed fracture fragments.


3. Acute appearing fractures of the anterolateral left 8-10th ribs.


4. Patchy areas of consolidation with areas of nodular consolidation in both 

lungs, suggestive of infectious/ inflammatory process.  Given nodular 

components, a follow-up CT of the chest in 3 months is recommended to confirm 

complete resolution.  Commonly reported imaging features of COVID-19  pneumonia 

are present. Other processes such as influenza pneumonia and organizing 

pneumonia, as can be seen with drug toxicity and connective tissue disease, can 

cause a similar imaging pattern.


5. No evidence of acute traumatic solid organ or vascular injury in the chest, 

abdomen or pelvis.


 








Hip/Pelvis X-Ray  08/22/20 14:33


IMPRESSION:


1. Acute subcapital fracture right femur.


2. Moderate osteopenia.


 








Skeletal Survey  08/25/20 07:00


IMPRESSION:  1.  Acute/subacute impacted fracture of the right femoral neck.


2.  Findings as described.


 








Chest X-Ray  08/30/20 00:00


IMPRESSION:  Interval development of patchy opacity at the left lung base 

obscuring the left hemidiaphragm suspicious for infection.  Recommend 

radiographic follow-up after appropriate treatment interval to document 

resolution.


 














Assessment & Plan





- Diagnosis


(1) Sepsis


Qualifiers: 


   Sepsis type: methicillin susceptible Staphylococcus aureus   Sepsis acute 

organ dysfunction status: with acute organ dysfunction   Severe sepsis acute 

organ dysfunction type: acute renal failure   Acute renal failure type: with 

acute tubular necrosis   Severe sepsis shock status: without septic shock   

Qualified Code(s): A41.01 - Sepsis due to Methicillin susceptible Staphylococcus

aureus; R65.20 - Severe sepsis without septic shock; N17.0 - Acute kidney 

failure with tubular necrosis   


Is this a current diagnosis for this admission?: Yes   


Plan: 


Patient with staph aureus septicemia, on antibiotic with cefepime he has MSSA








(2) Pneumonia due to COVID-19 virus


Is this a current diagnosis for this admission?: Yes   





(3) Staphylococcus aureus septicemia


Is this a current diagnosis for this admission?: Yes   


Plan: 


He has methicillin sensitive staph aureus, MSSA,  continue cefepime








(4) Acute kidney injury


Is this a current diagnosis for this admission?: Yes   


Plan: 


improving 








(5) Multiple fractures of ribs of left side


Qualifiers: 


   Encounter type: initial encounter   Fracture type: closed   Qualified Code

(s): S22.42XA - Multiple fractures of ribs, left side, initial encounter for 

closed fracture   


Is this a current diagnosis for this admission?: Yes   


Plan: 


This is most likely from osteoporosis








(6) Compression fracture of T12 vertebra


Qualifiers: 


   Encounter type: initial encounter   Qualified Code(s): S22.080A - Wedge 

compression fracture of T11-T12 vertebra, initial encounter for closed fracture 

 


Is this a current diagnosis for this admission?: Yes   


Plan: 


This is due to osteoporotic fracture








(7) Pathologic fracture


Qualifiers: 


   Pathology associated with fracture: osteoporosis   Osteoporosis type: age-

related   Site of pathological fracture: vertebra   Encounter type: initial 

encounter   Qualified Code(s): M80.08XA - Age-related osteoporosis with current 

pathological fracture, vertebra(e), initial encounter for fracture   


Is this a current diagnosis for this admission?: Yes   





(8) Hypocalcemia


Is this a current diagnosis for this admission?: Yes   


Plan: 


This is secondary to CKD








(9) Metabolic acidosis


Is this a current diagnosis for this admission?: Yes   





(10) Subcapital fracture of right femur


Qualifiers: 


   Encounter type: initial encounter   Fracture type: closed   Qualified 

Code(s): S72.011A - Unspecified intracapsular fracture of right femur, initial 

encounter for closed fracture   


Is this a current diagnosis for this admission?: Yes   





(11) Secondary hyperparathyroidism


Is this a current diagnosis for this admission?: Yes   


Plan: 


He has secondary hyperparathyroidism,Continue, calcitriol ,vitamin D








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


Is this a current diagnosis for this admission?: Yes   





- Time


Time Spent with patient: 25-34 minutes


Level of Care: IMCU


Medications reviewed and adjusted accordingly: Yes


Anticipated discharge: SNF


Anticipated DC Timeframe: when bed available

## 2020-09-01 NOTE — PDOC PROGRESS REPORT
Subjective


Progress Note for:: 09/01/20


Subjective:: 





Patient seen by the bedside, more alert today more oriented making progress


Reason For Visit: 


RECURRENT FALLS,RIGHT FEMORAL FRACTURE,PNEUMONIA,








Physical Exam


Vital Signs: 


                                        











Temp Pulse Resp BP Pulse Ox


 


 98.0 F   79   20   135/67 H  94 


 


 09/01/20 16:24  09/01/20 19:00  09/01/20 16:24  09/01/20 16:24  09/01/20 16:24








                                 Intake & Output











 08/31/20 09/01/20 09/02/20





 06:59 06:59 06:59


 


Intake Total 1150 2617 50


 


Output Total 1500 1250 700


 


Balance -350 1367 -650


 


Weight 79.6 kg 79.7 kg 











General appearance: PRESENT: no acute distress


Eye exam: PRESENT: PERRLA


Respiratory exam: PRESENT: clear to auscultation beatrice


Cardiovascular exam: PRESENT: +S1, +S2


Neurological exam: PRESENT: alert





Results


Laboratory Results: 


                                        





                                 09/01/20 04:46 





                                 09/01/20 04:46 





                                        











  08/29/20 09/01/20 09/01/20





  12:45 04:46 04:46


 


WBC   11.7 H 


 


RBC   3.42 L 


 


Hgb   10.2 L 


 


Hct   31.4 L 


 


MCV   92 


 


MCH   29.8 


 


MCHC   32.6 


 


RDW   13.9 


 


Plt Count   192 


 


Seg Neutrophils %   Not Reportable 


 


Sodium    140.7


 


Potassium    3.9


 


Chloride    108 H


 


Carbon Dioxide    26


 


Anion Gap    7


 


BUN    32 H


 


Creatinine    1.27 H


 


Est GFR ( Amer)    > 60


 


Glucose    129 H


 


Calcium    5.8 L*


 


Total Bilirubin    0.6


 


AST    33


 


Alkaline Phosphatase    68


 


Total Protein  5.8 L   4.9 L


 


Albumin    2.5 L








                                        











  08/22/20 08/22/20 08/22/20





  11:40 11:40 12:38


 


Creatine Kinase  Cancelled   1513 H


 


CK-MB (CK-2)   


 


Troponin I   Cancelled 


 


NT-Pro-B Natriuret Pep   














  08/22/20 08/22/20 08/22/20





  12:38 12:38 16:55


 


Creatine Kinase   


 


CK-MB (CK-2)   


 


Troponin I  0.122   0.111


 


NT-Pro-B Natriuret Pep   4610 H 














  08/29/20 08/29/20





  02:31 02:31


 


Creatine Kinase  106 


 


CK-MB (CK-2)   2.04


 


Troponin I   0.013


 


NT-Pro-B Natriuret Pep  











Impressions: 


                                        





Cervical Spine CT  08/22/20 12:18


IMPRESSION:  No acute fracture or dislocation of the cervical spine.  Multilevel

spondylosis, degenerative disc disease, and facet arthropathy.


 








Head CT  08/22/20 12:18


IMPRESSION:  No acute intracranial hemorrhage, mass, or evidence of acute 

territorial infarct.


EVIDENCE OF ACUTE STROKE: NO.


 








Chest CT  08/22/20 12:21


IMPRESSION:


1. Although there is motion in the pelvis, there appears to be an acute impacted

subcapital fracture of the right proximal femur.  Further evaluation with de

dicated two view radiograph is recommended.


2. Age-indeterminate moderate compression fracture at T12, however this is 

stable since 8/6/2020 radiograph.  This may be subacute or chronic.  No 

retropulsed fracture fragments.


3. Acute appearing fractures of the anterolateral left 8-10th ribs.


4. Patchy areas of consolidation with areas of nodular consolidation in both 

lungs, suggestive of infectious/ inflammatory process.  Given nodular 

components, a follow-up CT of the chest in 3 months is recommended to confirm 

complete resolution.  Commonly reported imaging features of COVID-19  pneumonia 

are present. Other processes such as influenza pneumonia and organizing 

pneumonia, as can be seen with drug toxicity and connective tissue disease, can 

cause a similar imaging pattern.


5. No evidence of acute traumatic solid organ or vascular injury in the chest, 

abdomen or pelvis.


 








Abdomen/Pelvis CT  08/22/20 12:23


IMPRESSION:


1. Although there is motion in the pelvis, there appears to be an acute impacted

subcapital fracture of the right proximal femur.  Further evaluation with 

dedicated two view radiograph is recommended.


2. Age-indeterminate moderate compression fracture at T12, however this is 

stable since 8/6/2020 radiograph.  This may be subacute or chronic.  No 

retropulsed fracture fragments.


3. Acute appearing fractures of the anterolateral left 8-10th ribs.


4. Patchy areas of consolidation with areas of nodular consolidation in both 

lungs, suggestive of infectious/ inflammatory process.  Given nodular 

components, a follow-up CT of the chest in 3 months is recommended to confirm 

complete resolution.  Commonly reported imaging features of COVID-19  pneumonia 

are present. Other processes such as influenza pneumonia and organizing 

pneumonia, as can be seen with drug toxicity and connective tissue disease, can 

cause a similar imaging pattern.


5. No evidence of acute traumatic solid organ or vascular injury in the chest, 

abdomen or pelvis.


 








Hip/Pelvis X-Ray  08/22/20 14:33


IMPRESSION:


1. Acute subcapital fracture right femur.


2. Moderate osteopenia.


 








Skeletal Survey  08/25/20 07:00


IMPRESSION:  1.  Acute/subacute impacted fracture of the right femoral neck.


2.  Findings as described.


 








Chest X-Ray  08/30/20 00:00


IMPRESSION:  Interval development of patchy opacity at the left lung base 

obscuring the left hemidiaphragm suspicious for infection.  Recommend radi

ographic follow-up after appropriate treatment interval to document resolution.


 














Assessment & Plan





- Diagnosis


(1) Sepsis


Qualifiers: 


   Sepsis type: methicillin susceptible Staphylococcus aureus   Sepsis acute 

organ dysfunction status: with acute organ dysfunction   Severe sepsis acute 

organ dysfunction type: acute renal failure   Acute renal failure type: with 

acute tubular necrosis   Severe sepsis shock status: without septic shock   

Qualified Code(s): A41.01 - Sepsis due to Methicillin susceptible Staphylococcus

aureus; R65.20 - Severe sepsis without septic shock; N17.0 - Acute kidney 

failure with tubular necrosis   


Is this a current diagnosis for this admission?: Yes   





(2) Pneumonia due to COVID-19 virus


Is this a current diagnosis for this admission?: Yes   


Plan: 


Patient will continue dexamethasone for a total of 10 days








(3) Staphylococcus aureus septicemia


Is this a current diagnosis for this admission?: Yes   


Plan: 


Continue antibiotic








(4) Acute kidney injury


Is this a current diagnosis for this admission?: Yes   





(5) Multiple fractures of ribs of left side


Qualifiers: 


   Encounter type: initial encounter   Fracture type: closed   Qualified 

Code(s): S22.42XA - Multiple fractures of ribs, left side, initial encounter for

closed fracture   


Is this a current diagnosis for this admission?: Yes   





(6) Compression fracture of T12 vertebra


Qualifiers: 


   Encounter type: initial encounter   Qualified Code(s): S22.080A - Wedge 

compression fracture of T11-T12 vertebra, initial encounter for closed fracture 

 


Is this a current diagnosis for this admission?: Yes   





(7) Pathologic fracture


Qualifiers: 


   Pathology associated with fracture: osteoporosis   Osteoporosis type: age-

related   Site of pathological fracture: vertebra   Encounter type: initial 

encounter   Qualified Code(s): M80.08XA - Age-related osteoporosis with current 

pathological fracture, vertebra(e), initial encounter for fracture   


Is this a current diagnosis for this admission?: Yes   





(8) Hypocalcemia


Is this a current diagnosis for this admission?: Yes   





(9) Metabolic acidosis


Is this a current diagnosis for this admission?: Yes   





(10) Subcapital fracture of right femur


Qualifiers: 


   Encounter type: initial encounter   Fracture type: closed   Qualified Code

(s): S72.011A - Unspecified intracapsular fracture of right femur, initial 

encounter for closed fracture   


Is this a current diagnosis for this admission?: Yes   





(11) Secondary hyperparathyroidism


Is this a current diagnosis for this admission?: Yes   





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


Is this a current diagnosis for this admission?: Yes   





- Time


Time Spent with patient: 25-34 minutes


Level of Care: IMCU


Anticipated discharge: SNF


Anticipated DC Timeframe: when bed available

## 2020-09-02 NOTE — PDOC PROGRESS REPORT
Subjective


Progress Note for:: 09/02/20


Subjective:: 





Patient seen by the bedside


Reason For Visit: 


RECURRENT FALLS,RIGHT FEMORAL FRACTURE,PNEUMONIA,








Physical Exam


Vital Signs: 


                                        











Temp Pulse Resp BP Pulse Ox


 


 98.2 F   77   16   112/72   93 


 


 09/02/20 08:37  09/02/20 14:00  09/02/20 08:37  09/02/20 08:37  09/02/20 08:37








                                 Intake & Output











 09/01/20 09/02/20 09/03/20





 06:59 06:59 06:59


 


Intake Total 2617 1250 1050


 


Output Total 1250 1375 


 


Balance 1367 -125 1050


 


Weight 79.7 kg 80.1 kg 











General appearance: PRESENT: no acute distress


Eye exam: PRESENT: PERRLA


Respiratory exam: PRESENT: clear to auscultation beatrice


Cardiovascular exam: PRESENT: +S1, +S2


GI/Abdominal exam: PRESENT: soft


Neurological exam: PRESENT: alert





Results


Laboratory Results: 


                                        





                                 09/01/20 04:46 





                                 09/01/20 04:46 





                                        











  08/22/20 08/22/20 08/22/20





  11:40 11:40 12:38


 


Creatine Kinase  Cancelled   1513 H


 


CK-MB (CK-2)   


 


Troponin I   Cancelled 


 


NT-Pro-B Natriuret Pep   














  08/22/20 08/22/20 08/22/20





  12:38 12:38 16:55


 


Creatine Kinase   


 


CK-MB (CK-2)   


 


Troponin I  0.122   0.111


 


NT-Pro-B Natriuret Pep   4610 H 














  08/29/20 08/29/20





  02:31 02:31


 


Creatine Kinase  106 


 


CK-MB (CK-2)   2.04


 


Troponin I   0.013


 


NT-Pro-B Natriuret Pep  











Impressions: 


                                        





Cervical Spine CT  08/22/20 12:18


IMPRESSION:  No acute fracture or dislocation of the cervical spine.  Multilevel

spondylosis, degenerative disc disease, and facet arthropathy.


 








Head CT  08/22/20 12:18


IMPRESSION:  No acute intracranial hemorrhage, mass, or evidence of acute 

territorial infarct.


EVIDENCE OF ACUTE STROKE: NO.


 








Chest CT  08/22/20 12:21


IMPRESSION:


1. Although there is motion in the pelvis, there appears to be an acute impacted

subcapital fracture of the right proximal femur.  Further evaluation with 

dedicated two view radiograph is recommended.


2. Age-indeterminate moderate compression fracture at T12, however this is 

stable since 8/6/2020 radiograph.  This may be subacute or chronic.  No 

retropulsed fracture fragments.


3. Acute appearing fractures of the anterolateral left 8-10th ribs.


4. Patchy areas of consolidation with areas of nodular consolidation in both 

lungs, suggestive of infectious/ inflammatory process.  Given nodular 

components, a follow-up CT of the chest in 3 months is recommended to confirm 

complete resolution.  Commonly reported imaging features of COVID-19  pneumonia 

are present. Other processes such as influenza pneumonia and organizing pneu

monia, as can be seen with drug toxicity and connective tissue disease, can 

cause a similar imaging pattern.


5. No evidence of acute traumatic solid organ or vascular injury in the chest, 

abdomen or pelvis.


 








Abdomen/Pelvis CT  08/22/20 12:23


IMPRESSION:


1. Although there is motion in the pelvis, there appears to be an acute impacted

subcapital fracture of the right proximal femur.  Further evaluation with 

dedicated two view radiograph is recommended.


2. Age-indeterminate moderate compression fracture at T12, however this is 

stable since 8/6/2020 radiograph.  This may be subacute or chronic.  No 

retropulsed fracture fragments.


3. Acute appearing fractures of the anterolateral left 8-10th ribs.


4. Patchy areas of consolidation with areas of nodular consolidation in both 

lungs, suggestive of infectious/ inflammatory process.  Given nodular 

components, a follow-up CT of the chest in 3 months is recommended to confirm 

complete resolution.  Commonly reported imaging features of COVID-19  pneumonia 

are present. Other processes such as influenza pneumonia and organizing 

pneumonia, as can be seen with drug toxicity and connective tissue disease, can 

cause a similar imaging pattern.


5. No evidence of acute traumatic solid organ or vascular injury in the chest, a

bdomen or pelvis.


 








Hip/Pelvis X-Ray  08/22/20 14:33


IMPRESSION:


1. Acute subcapital fracture right femur.


2. Moderate osteopenia.


 








Skeletal Survey  08/25/20 07:00


IMPRESSION:  1.  Acute/subacute impacted fracture of the right femoral neck.


2.  Findings as described.


 








Chest X-Ray  08/30/20 00:00


IMPRESSION:  Interval development of patchy opacity at the left lung base 

obscuring the left hemidiaphragm suspicious for infection.  Recommend 

radiographic follow-up after appropriate treatment interval to document reso

lution.


 














Assessment & Plan





- Diagnosis


(1) Sepsis


Qualifiers: 


   Sepsis type: methicillin susceptible Staphylococcus aureus   Sepsis acute 

organ dysfunction status: with acute organ dysfunction   Severe sepsis acute 

organ dysfunction type: acute renal failure   Acute renal failure type: with 

acute tubular necrosis   Severe sepsis shock status: without septic shock   

Qualified Code(s): A41.01 - Sepsis due to Methicillin susceptible Staphylococcus

aureus; R65.20 - Severe sepsis without septic shock; N17.0 - Acute kidney 

failure with tubular necrosis   


Is this a current diagnosis for this admission?: Yes   





(2) Pneumonia due to COVID-19 virus


Is this a current diagnosis for this admission?: Yes   





(3) Staphylococcus aureus septicemia


Is this a current diagnosis for this admission?: Yes   





(4) Acute kidney injury


Is this a current diagnosis for this admission?: Yes   





(5) Multiple fractures of ribs of left side


Qualifiers: 


   Encounter type: initial encounter   Fracture type: closed   Qualified 

Code(s): S22.42XA - Multiple fractures of ribs, left side, initial encounter for

closed fracture   


Is this a current diagnosis for this admission?: Yes   





(6) Compression fracture of T12 vertebra


Qualifiers: 


   Encounter type: initial encounter   Qualified Code(s): S22.080A - Wedge 

compression fracture of T11-T12 vertebra, initial encounter for closed fracture 

 


Is this a current diagnosis for this admission?: Yes   





(7) Pathologic fracture


Qualifiers: 


   Pathology associated with fracture: osteoporosis   Osteoporosis type: age-

related   Site of pathological fracture: vertebra   Encounter type: initial 

encounter   Qualified Code(s): M80.08XA - Age-related osteoporosis with current 

pathological fracture, vertebra(e), initial encounter for fracture   


Is this a current diagnosis for this admission?: Yes   





(8) Hypocalcemia


Is this a current diagnosis for this admission?: Yes   





(9) Metabolic acidosis


Is this a current diagnosis for this admission?: Yes   





(10) Subcapital fracture of right femur


Qualifiers: 


   Encounter type: initial encounter   Fracture type: closed   Qualified 

Code(s): S72.011A - Unspecified intracapsular fracture of right femur, initial 

encounter for closed fracture   


Is this a current diagnosis for this admission?: Yes   


Plan: 


Scheduled for ORIF tomorrow








(11) Secondary hyperparathyroidism


Is this a current diagnosis for this admission?: Yes   





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


Is this a current diagnosis for this admission?: Yes   





- Time


Time Spent with patient: 25-34 minutes


Level of Care: IMCU


Medications reviewed and adjusted accordingly: Yes


Anticipated discharge: SNF


Anticipated DC Timeframe: when bed available





- Plan Summary


Plan Summary: 





Patient will finish the last dose of dexamethasone today

## 2020-09-03 NOTE — PROGRESS NOTE
Provider Note


Provider Note: 





Notified this afternoon that patient's medical condition has stabilized and 

patient is able to undergo hemiarthroplasty of the right hip.





Patient is scheduled for right hip hemiarthroplasty tomorrow.


NPO post midnight.

## 2020-09-03 NOTE — RADIOLOGY REPORT (SQ)
EXAM DESCRIPTION:  CHEST SINGLE VIEW



IMAGES COMPLETED DATE/TIME:  9/3/2020 2:28 pm



REASON FOR STUDY:  pneumonia



COMPARISON:  8/30/2020



NUMBER OF VIEWS:  One view.



TECHNIQUE:  Single frontal radiographic image of the chest acquired.



LIMITATIONS:  None.



FINDINGS:  LUNGS AND PLEURA: Improved aeration left lower lobe with persistent elevation of the diaph
ragm.  Right lung is clear.

MEDIASTINUM AND HEART: Stable heart size and mediastinal structures.

BONY STRUCTURES: No acute findings.

HARDWARE: None.

OTHER: No other significant finding.



IMPRESSION:  Improving pneumonia.



TECHNICAL DOCUMENTATION:  JOB ID:  [id number]



Reading location - IP/workstation name: [email or web address]

## 2020-09-03 NOTE — PDOC PROGRESS REPORT
Subjective


Progress Note for:: 09/03/20


Subjective:: 





Patient has finished treatment for SARS-CoV-2, is scheduled for right 

hemiarthroplasty tomorrow, Chest x-ray showed improved left lower lobe, right 

lung is clear


Reason For Visit: 


RECURRENT FALLS,RIGHT FEMORAL FRACTURE,PNEUMONIA,








Physical Exam


Vital Signs: 


                                        











Temp Pulse Resp BP Pulse Ox


 


 98.2 F   81   19   151/81 H  95 


 


 09/03/20 15:48  09/03/20 15:48  09/03/20 15:48  09/03/20 15:48  09/03/20 15:48








                                 Intake & Output











 09/02/20 09/03/20 09/04/20





 06:59 06:59 06:59


 


Intake Total 1250 2790 1252


 


Output Total 1375 775 


 


Balance -125 2015 1252


 


Weight 80.1 kg 82.3 kg 











General appearance: PRESENT: no acute distress


Eye exam: PRESENT: PERRLA


Respiratory exam: PRESENT: clear to auscultation beatrice


Cardiovascular exam: PRESENT: +S1, +S2


Neurological exam: PRESENT: alert





Results


Laboratory Results: 


                                        





                                 09/01/20 04:46 





                                 09/01/20 04:46 





                                        











  08/22/20 08/22/20 08/22/20





  11:40 11:40 12:38


 


Creatine Kinase  Cancelled   1513 H


 


CK-MB (CK-2)   


 


Troponin I   Cancelled 


 


NT-Pro-B Natriuret Pep   














  08/22/20 08/22/20 08/22/20





  12:38 12:38 16:55


 


Creatine Kinase   


 


CK-MB (CK-2)   


 


Troponin I  0.122   0.111


 


NT-Pro-B Natriuret Pep   4610 H 














  08/29/20 08/29/20





  02:31 02:31


 


Creatine Kinase  106 


 


CK-MB (CK-2)   2.04


 


Troponin I   0.013


 


NT-Pro-B Natriuret Pep  











Impressions: 


                                        





Cervical Spine CT  08/22/20 12:18


IMPRESSION:  No acute fracture or dislocation of the cervical spine.  Multilevel

spondylosis, degenerative disc disease, and facet arthropathy.


 








Head CT  08/22/20 12:18


IMPRESSION:  No acute intracranial hemorrhage, mass, or evidence of acute 

territorial infarct.


EVIDENCE OF ACUTE STROKE: NO.


 








Chest CT  08/22/20 12:21


IMPRESSION:


1. Although there is motion in the pelvis, there appears to be an acute impacted

subcapital fracture of the right proximal femur.  Further evaluation with 

dedicated two view radiograph is recommended.


2. Age-indeterminate moderate compression fracture at T12, however this is 

stable since 8/6/2020 radiograph.  This may be subacute or chronic.  No retro

pulsed fracture fragments.


3. Acute appearing fractures of the anterolateral left 8-10th ribs.


4. Patchy areas of consolidation with areas of nodular consolidation in both 

lungs, suggestive of infectious/ inflammatory process.  Given nodular 

components, a follow-up CT of the chest in 3 months is recommended to confirm 

complete resolution.  Commonly reported imaging features of COVID-19  pneumonia 

are present. Other processes such as influenza pneumonia and organizing 

pneumonia, as can be seen with drug toxicity and connective tissue disease, can 

cause a similar imaging pattern.


5. No evidence of acute traumatic solid organ or vascular injury in the chest, 

abdomen or pelvis.


 








Abdomen/Pelvis CT  08/22/20 12:23


IMPRESSION:


1. Although there is motion in the pelvis, there appears to be an acute impacted

subcapital fracture of the right proximal femur.  Further evaluation with 

dedicated two view radiograph is recommended.


2. Age-indeterminate moderate compression fracture at T12, however this is 

stable since 8/6/2020 radiograph.  This may be subacute or chronic.  No 

retropulsed fracture fragments.


3. Acute appearing fractures of the anterolateral left 8-10th ribs.


4. Patchy areas of consolidation with areas of nodular consolidation in both 

lungs, suggestive of infectious/ inflammatory process.  Given nodular 

components, a follow-up CT of the chest in 3 months is recommended to confirm 

complete resolution.  Commonly reported imaging features of COVID-19  pneumonia 

are present. Other processes such as influenza pneumonia and organizing 

pneumonia, as can be seen with drug toxicity and connective tissue disease, can 

cause a similar imaging pattern.


5. No evidence of acute traumatic solid organ or vascular injury in the chest, 

abdomen or pelvis.


 








Hip/Pelvis X-Ray  08/22/20 14:33


IMPRESSION:


1. Acute subcapital fracture right femur.


2. Moderate osteopenia.


 








Skeletal Survey  08/25/20 07:00


IMPRESSION:  1.  Acute/subacute impacted fracture of the right femoral neck.


2.  Findings as described.


 








Chest X-Ray  09/03/20 00:00


IMPRESSION:  Improving pneumonia.


 














Assessment & Plan





- Diagnosis


(1) Sepsis


Qualifiers: 


   Sepsis type: methicillin susceptible Staphylococcus aureus   Sepsis acute 

organ dysfunction status: with acute organ dysfunction   Severe sepsis acute 

organ dysfunction type: acute renal failure   Acute renal failure type: with 

acute tubular necrosis   Severe sepsis shock status: without septic shock   

Qualified Code(s): A41.01 - Sepsis due to Methicillin susceptible Staphylococcus

aureus; R65.20 - Severe sepsis without septic shock; N17.0 - Acute kidney 

failure with tubular necrosis   


Is this a current diagnosis for this admission?: Yes   





(2) Pneumonia due to COVID-19 virus


Is this a current diagnosis for this admission?: Yes   





(3) Staphylococcus aureus septicemia


Is this a current diagnosis for this admission?: Yes   





(4) Acute kidney injury


Is this a current diagnosis for this admission?: Yes   





(5) Multiple fractures of ribs of left side


Qualifiers: 


   Encounter type: initial encounter   Fracture type: closed   Qualified 

Code(s): S22.42XA - Multiple fractures of ribs, left side, initial encounter for

closed fracture   


Is this a current diagnosis for this admission?: Yes   





(6) Compression fracture of T12 vertebra


Qualifiers: 


   Encounter type: initial encounter   Qualified Code(s): S22.080A - Wedge 

compression fracture of T11-T12 vertebra, initial encounter for closed fracture 

 


Is this a current diagnosis for this admission?: Yes   





(7) Pathologic fracture


Qualifiers: 


   Pathology associated with fracture: osteoporosis   Osteoporosis type: age-

related   Site of pathological fracture: vertebra   Encounter type: initial 

encounter   Qualified Code(s): M80.08XA - Age-related osteoporosis with current 

pathological fracture, vertebra(e), initial encounter for fracture   


Is this a current diagnosis for this admission?: Yes   





(8) Hypocalcemia


Is this a current diagnosis for this admission?: Yes   





(9) Metabolic acidosis


Is this a current diagnosis for this admission?: Yes   





(10) Subcapital fracture of right femur


Qualifiers: 


   Encounter type: initial encounter   Fracture type: closed   Qualified 

Code(s): S72.011A - Unspecified intracapsular fracture of right femur, initial 

encounter for closed fracture   


Is this a current diagnosis for this admission?: Yes   





(11) Secondary hyperparathyroidism


Is this a current diagnosis for this admission?: Yes   





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


Is this a current diagnosis for this admission?: Yes   





- Time


Time Spent with patient: 15-24 minutes


Level of Care: IMCU


Anticipated discharge: SNF


Anticipated DC Timeframe: when bed available

## 2020-09-04 NOTE — PDOC PROGRESS REPORT
Subjective


Progress Note for:: 09/04/20


Subjective:: 





Patient was supposed to have ORIF of the right hip joint today but the 

anesthesiologist canceled the procedure because of hypokalemia


Reason For Visit: 


RECURRENT FALLS,RIGHT FEMORAL FRACTURE,PNEUMONIA,








Physical Exam


Vital Signs: 


                                        











Temp Pulse Resp BP Pulse Ox


 


 98.0 F   75   14   157/87 H  95 


 


 09/04/20 15:56  09/04/20 15:56  09/04/20 15:56  09/04/20 15:56  09/04/20 15:56








                                 Intake & Output











 09/03/20 09/04/20 09/05/20





 06:59 06:59 06:59


 


Intake Total 2790 2072 410


 


Output Total 775 1070 600


 


Balance 2015 1002 -190


 


Weight 82.3 kg 82.6 kg 














Results


Laboratory Results: 


                                        





                                 09/04/20 09:20 





                                 09/04/20 09:20 





                                        











  09/04/20 09/04/20





  09:20 09:20


 


WBC  11.7 H 


 


RBC  3.42 L 


 


Hgb  10.8 L 


 


Hct  31.8 L 


 


MCV  93 


 


MCH  31.4 


 


MCHC  33.8 


 


RDW  14.5 H 


 


Plt Count  152 


 


Seg Neutrophils %  Not Reportable 


 


Sodium   144.4


 


Potassium   2.8 L*


 


Chloride   112 H


 


Carbon Dioxide   24


 


Anion Gap   8


 


BUN   33 H


 


Creatinine   1.20


 


Est GFR (African Amer)   > 60


 


Glucose   92


 


Calcium   6.1 L*


 


Total Bilirubin   0.6


 


AST   21


 


Alkaline Phosphatase   62


 


Total Protein   4.9 L


 


Albumin   2.4 L








                                        











  08/22/20 08/22/20 08/22/20





  11:40 11:40 12:38


 


Creatine Kinase  Cancelled   1513 H


 


CK-MB (CK-2)   


 


Troponin I   Cancelled 


 


NT-Pro-B Natriuret Pep   














  08/22/20 08/22/20 08/22/20





  12:38 12:38 16:55


 


Creatine Kinase   


 


CK-MB (CK-2)   


 


Troponin I  0.122   0.111


 


NT-Pro-B Natriuret Pep   4610 H 














  08/29/20 08/29/20





  02:31 02:31


 


Creatine Kinase  106 


 


CK-MB (CK-2)   2.04


 


Troponin I   0.013


 


NT-Pro-B Natriuret Pep  











Impressions: 


                                        





Cervical Spine CT  08/22/20 12:18


IMPRESSION:  No acute fracture or dislocation of the cervical spine.  Multilevel

spondylosis, degenerative disc disease, and facet arthropathy.


 








Head CT  08/22/20 12:18


IMPRESSION:  No acute intracranial hemorrhage, mass, or evidence of acute 

territorial infarct.


EVIDENCE OF ACUTE STROKE: NO.


 








Chest CT  08/22/20 12:21


IMPRESSION:


1. Although there is motion in the pelvis, there appears to be an acute impacted

subcapital fracture of the right proximal femur.  Further evaluation with 

dedicated two view radiograph is recommended.


2. Age-indeterminate moderate compression fracture at T12, however this is 

stable since 8/6/2020 radiograph.  This may be subacute or chronic.  No 

retropulsed fracture fragments.


3. Acute appearing fractures of the anterolateral left 8-10th ribs.


4. Patchy areas of consolidation with areas of nodular consolidation in both 

lungs, suggestive of infectious/ inflammatory process.  Given nodular 

components, a follow-up CT of the chest in 3 months is recommended to confirm 

complete resolution.  Commonly reported imaging features of COVID-19  pneumonia 

are present. Other processes such as influenza pneumonia and organizing 

pneumonia, as can be seen with drug toxicity and connective tissue disease, can 

cause a similar imaging pattern.


5. No evidence of acute traumatic solid organ or vascular injury in the chest, 

abdomen or pelvis.


 








Abdomen/Pelvis CT  08/22/20 12:23


IMPRESSION:


1. Although there is motion in the pelvis, there appears to be an acute impacted

subcapital fracture of the right proximal femur.  Further evaluation with ded

icated two view radiograph is recommended.


2. Age-indeterminate moderate compression fracture at T12, however this is 

stable since 8/6/2020 radiograph.  This may be subacute or chronic.  No 

retropulsed fracture fragments.


3. Acute appearing fractures of the anterolateral left 8-10th ribs.


4. Patchy areas of consolidation with areas of nodular consolidation in both 

lungs, suggestive of infectious/ inflammatory process.  Given nodular 

components, a follow-up CT of the chest in 3 months is recommended to confirm 

complete resolution.  Commonly reported imaging features of COVID-19  pneumonia 

are present. Other processes such as influenza pneumonia and organizing 

pneumonia, as can be seen with drug toxicity and connective tissue disease, can 

cause a similar imaging pattern.


5. No evidence of acute traumatic solid organ or vascular injury in the chest, 

abdomen or pelvis.


 








Hip/Pelvis X-Ray  08/22/20 14:33


IMPRESSION:


1. Acute subcapital fracture right femur.


2. Moderate osteopenia.


 








Skeletal Survey  08/25/20 07:00


IMPRESSION:  1.  Acute/subacute impacted fracture of the right femoral neck.


2.  Findings as described.


 








Chest X-Ray  09/03/20 00:00


IMPRESSION:  Improving pneumonia.


 














Assessment & Plan





- Diagnosis


(1) Sepsis


Qualifiers: 


   Sepsis type: methicillin susceptible Staphylococcus aureus   Sepsis acute 

organ dysfunction status: with acute organ dysfunction   Severe sepsis acute 

organ dysfunction type: acute renal failure   Acute renal failure type: with 

acute tubular necrosis   Severe sepsis shock status: without septic shock   

Qualified Code(s): A41.01 - Sepsis due to Methicillin susceptible Staphylococcus

aureus; R65.20 - Severe sepsis without septic shock; N17.0 - Acute kidney f

ailure with tubular necrosis   


Is this a current diagnosis for this admission?: Yes   





(2) Pneumonia due to COVID-19 virus


Is this a current diagnosis for this admission?: Yes   





(3) Staphylococcus aureus septicemia


Is this a current diagnosis for this admission?: Yes   





(4) Acute kidney injury


Is this a current diagnosis for this admission?: Yes   


Plan: 


improved 








(5) Multiple fractures of ribs of left side


Qualifiers: 


   Encounter type: initial encounter   Fracture type: closed   Qualified 

Code(s): S22.42XA - Multiple fractures of ribs, left side, initial encounter for

closed fracture   


Is this a current diagnosis for this admission?: Yes   





(6) Compression fracture of T12 vertebra


Qualifiers: 


   Encounter type: initial encounter   Qualified Code(s): S22.080A - Wedge 

compression fracture of T11-T12 vertebra, initial encounter for closed fracture 

 


Is this a current diagnosis for this admission?: Yes   


Plan: 


due to osteoporosis 








(7) Pathologic fracture


Qualifiers: 


   Pathology associated with fracture: osteoporosis   Osteoporosis type: age-

related   Site of pathological fracture: vertebra   Encounter type: initial 

encounter   Qualified Code(s): M80.08XA - Age-related osteoporosis with current 

pathological fracture, vertebra(e), initial encounter for fracture   


Is this a current diagnosis for this admission?: Yes   


Plan: 


He has osteoporotic fracture








(8) Hypocalcemia


Is this a current diagnosis for this admission?: Yes   


Plan: 


This is secondary to CKD








(9) Metabolic acidosis


Is this a current diagnosis for this admission?: Yes   


Plan: 


Resolved








(10) Subcapital fracture of right femur


Qualifiers: 


   Encounter type: initial encounter   Fracture type: closed   Qualified 

Code(s): S72.011A - Unspecified intracapsular fracture of right femur, initial 

encounter for closed fracture   


Is this a current diagnosis for this admission?: Yes   


Plan: 


per orthopedics








(11) Secondary hyperparathyroidism


Is this a current diagnosis for this admission?: Yes   





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


Is this a current diagnosis for this admission?: Yes   





- Time


Time Spent with patient: 25-34 minutes


Level of Care: IMCU


Anticipated discharge: SNF


Anticipated DC Timeframe: when bed available

## 2020-09-04 NOTE — PDOC PROGRESS REPORT
Subjective


Progress Note for:: 09/04/20


Subjective:: 





Surgery cancelled by anesthesia due to electrolyte abnormality.


Reason For Visit: 


RECURRENT FALLS,RIGHT FEMORAL FRACTURE,PNEUMONIA,





right hip femoral neck fracture





Physical Exam


Vital Signs: 


                                        











Temp Pulse Resp BP Pulse Ox


 


 97.5 F   66   16   166/90 H  97 


 


 09/04/20 08:00  09/04/20 07:00  09/04/20 08:00  09/04/20 08:00  09/04/20 03:26








                                 Intake & Output











 09/03/20 09/04/20 09/05/20





 06:59 06:59 06:59


 


Intake Total 2790 2072 


 


Output Total 775 1070 


 


Balance 2015 1002 


 


Weight 82.3 kg 82.6 kg 














Results


Laboratory Results: 


                                        





                                 09/04/20 09:20 





                                 09/04/20 09:20 





                                        











  09/04/20 09/04/20





  09:20 09:20


 


WBC  11.7 H 


 


RBC  3.42 L 


 


Hgb  10.8 L 


 


Hct  31.8 L 


 


MCV  93 


 


MCH  31.4 


 


MCHC  33.8 


 


RDW  14.5 H 


 


Plt Count  152 


 


Seg Neutrophils %  Not Reportable 


 


Sodium   144.4


 


Potassium   2.8 L*


 


Chloride   112 H


 


Carbon Dioxide   24


 


Anion Gap   8


 


BUN   33 H


 


Creatinine   1.20


 


Est GFR (African Amer)   > 60


 


Glucose   92


 


Calcium   6.1 L*


 


Total Bilirubin   0.6


 


AST   21


 


Alkaline Phosphatase   62


 


Total Protein   4.9 L


 


Albumin   2.4 L








                                        











  08/22/20 08/22/20 08/22/20





  11:40 11:40 12:38


 


Creatine Kinase  Cancelled   1513 H


 


CK-MB (CK-2)   


 


Troponin I   Cancelled 


 


NT-Pro-B Natriuret Pep   














  08/22/20 08/22/20 08/22/20





  12:38 12:38 16:55


 


Creatine Kinase   


 


CK-MB (CK-2)   


 


Troponin I  0.122   0.111


 


NT-Pro-B Natriuret Pep   4610 H 














  08/29/20 08/29/20





  02:31 02:31


 


Creatine Kinase  106 


 


CK-MB (CK-2)   2.04


 


Troponin I   0.013


 


NT-Pro-B Natriuret Pep  











Impressions: 


                                        





Cervical Spine CT  08/22/20 12:18


IMPRESSION:  No acute fracture or dislocation of the cervical spine.  Multilevel

spondylosis, degenerative disc disease, and facet arthropathy.


 








Head CT  08/22/20 12:18


IMPRESSION:  No acute intracranial hemorrhage, mass, or evidence of acute 

territorial infarct.


EVIDENCE OF ACUTE STROKE: NO.


 








Chest CT  08/22/20 12:21


IMPRESSION:


1. Although there is motion in the pelvis, there appears to be an acute impacted

subcapital fracture of the right proximal femur.  Further evaluation with 

dedicated two view radiograph is recommended.


2. Age-indeterminate moderate compression fracture at T12, however this is 

stable since 8/6/2020 radiograph.  This may be subacute or chronic.  No 

retropulsed fracture fragments.


3. Acute appearing fractures of the anterolateral left 8-10th ribs.


4. Patchy areas of consolidation with areas of nodular consolidation in both 

lungs, suggestive of infectious/ inflammatory process.  Given nodular 

components, a follow-up CT of the chest in 3 months is recommended to confirm 

complete resolution.  Commonly reported imaging features of COVID-19  pneumonia 

are present. Other processes such as influenza pneumonia and organizing 

pneumonia, as can be seen with drug toxicity and connective tissue disease, can 

cause a similar imaging pattern.


5. No evidence of acute traumatic solid organ or vascular injury in the chest, 

abdomen or pelvis.


 








Abdomen/Pelvis CT  08/22/20 12:23


IMPRESSION:


1. Although there is motion in the pelvis, there appears to be an acute impacted

subcapital fracture of the right proximal femur.  Further evaluation with 

dedicated two view radiograph is recommended.


2. Age-indeterminate moderate compression fracture at T12, however this is 

stable since 8/6/2020 radiograph.  This may be subacute or chronic.  No 

retropulsed fracture fragments.


3. Acute appearing fractures of the anterolateral left 8-10th ribs.


4. Patchy areas of consolidation with areas of nodular consolidation in both 

lungs, suggestive of infectious/ inflammatory process.  Given nodular 

components, a follow-up CT of the chest in 3 months is recommended to confirm 

complete resolution.  Commonly reported imaging features of COVID-19  pneumonia 

are present. Other processes such as influenza pneumonia and organizing 

pneumonia, as can be seen with drug toxicity and connective tissue disease, can 

cause a similar imaging pattern.


5. No evidence of acute traumatic solid organ or vascular injury in the chest, 

abdomen or pelvis.


 








Hip/Pelvis X-Ray  08/22/20 14:33


IMPRESSION:


1. Acute subcapital fracture right femur.


2. Moderate osteopenia.


 








Skeletal Survey  08/25/20 07:00


IMPRESSION:  1.  Acute/subacute impacted fracture of the right femoral neck.


2.  Findings as described.


 








Chest X-Ray  09/03/20 00:00


IMPRESSION:  Improving pneumonia.


 














Assessment & Plan





- Diagnosis


(1) ARF (acute renal failure)


Qualifiers: 


   Acute renal failure type: unspecified   Qualified Code(s): N17.9 - Acute 

kidney failure, unspecified   


Is this a current diagnosis for this admission?: Yes   











(4) Hypocalcemia


Is this a current diagnosis for this admission?: Yes   








(6) Hypokalemia


Is this a current diagnosis for this admission?: Yes   





(7) Subcapital fracture of right femur


Qualifiers: 


   Encounter type: initial encounter   Fracture type: closed   Qualified 

Code(s): S72.011A - Unspecified intracapsular fracture of right femur, initial 

encounter for closed fracture   


Is this a current diagnosis for this admission?: Yes   





- Time


Critical Time spent with patient: Less than 15 minutes


Anticipated Discharge Disposition: Skilled Nursing Facility


Anticipated Discharge Timeframe: Following medical optimization and performance 

of right hip hemiarthroplasty





- Plan Summary


Plan Summary: 





Patient was scheduled for surgery this morning as he had been cleared by his [facility] team to undergo hemiarthroplasty.


The patient was brought down to the operating room and the team was ready to 

perform the procedure.


The surgery was cancelled by the anesthesiologist due to significant electrolyte

abnormalities that prohibited anesthesia.


Surgery has been scheduled for next week.  This will provide the medical team 

adequate time to optimize the patient for a right hip hemiarthroplasty.

## 2020-09-05 NOTE — PDOC PROGRESS REPORT
Subjective


Progress Note for:: 09/05/20


Subjective:: 





Patient still awaiting surgery due to electrolyte imbalance. No reported fever 

or chills. No chest pain or difficulty with breathing. No nausea, vomiting, or 

abdominal pain.


Reason For Visit: 


RECURRENT FALLS,RIGHT FEMORAL FRACTURE,PNEUMONIA,








Physical Exam


Vital Signs: 


                                        











Temp Pulse Resp BP Pulse Ox


 


 98.5 F   84   20   145/90 H  92 


 


 09/05/20 15:34  09/05/20 15:34  09/05/20 15:34  09/05/20 15:34  09/05/20 15:34








                                 Intake & Output











 09/04/20 09/05/20 09/06/20





 06:59 06:59 06:59


 


Intake Total 2072 1510 1590


 


Output Total 1070 1200 900


 


Balance 1002 310 690


 


Weight 82.6 kg 70.9 kg 70.9 kg











Physical Exam: 





General appearance: PRESENT: no acute distress


Head exam: PRESENT: atraumatic, normocephalic


Eye exam: PRESENT: conjunctiva pink.  ABSENT: pallor, sclera icterus


Mouth exam: PRESENT: moist


Respiratory exam: PRESENT: clear to auscultation beatrice


Cardiovascular exam: PRESENT: RRR, +S1, +S2.  ABSENT: diastolic murmur, rubs, 

systolic murmur


GI/Abdominal exam: PRESENT: normal bowel sounds, soft.  ABSENT: tenderness


Musculoskeletal exam: PRESENT: deformity - external rotation of right lower 

extremity, tenderness - right hip joint


Neurological exam: PRESENT: alert, awake, oriented to person, oriented to place,

oriented to time, oriented to situation, CN II-XII grossly intact.  ABSENT: 

motor sensory deficit


Psychiatric exam: PRESENT: appropriate affect, normal mood.  ABSENT: homicidal 

ideation, suicidal ideation


Skin exam: PRESENT: dry, warm





Results


Laboratory Results: 


                                        





                                 09/04/20 09:20 





                                 09/05/20 08:15 





                                        











  09/04/20 09/05/20 09/05/20





  21:14 08:15 08:15


 


Sodium  144.3  145.7 H 


 


Potassium  2.9 L*  2.7 L* 


 


Chloride  112 H  112 H 


 


Carbon Dioxide  25  26 


 


Anion Gap  7  8 


 


BUN  31 H  29 H 


 


Creatinine  1.19  1.24 


 


Est GFR ( Amer)  > 60  > 60 


 


Glucose  153 H  75 


 


Calcium  6.0 L*  6.0 L* 


 


Magnesium    1.1 L*


 


Total Bilirubin  0.6  0.6 


 


AST  20  20 


 


Alkaline Phosphatase  65  63 


 


Total Protein  5.2 L  4.9 L 


 


Albumin  2.6 L  2.5 L 








                                        











  08/22/20 08/22/20 08/22/20





  11:40 11:40 12:38


 


Creatine Kinase  Cancelled   1513 H


 


CK-MB (CK-2)   


 


Troponin I   Cancelled 


 


NT-Pro-B Natriuret Pep   














  08/22/20 08/22/20 08/22/20





  12:38 12:38 16:55


 


Creatine Kinase   


 


CK-MB (CK-2)   


 


Troponin I  0.122   0.111


 


NT-Pro-B Natriuret Pep   4610 H 














  08/29/20 08/29/20





  02:31 02:31


 


Creatine Kinase  106 


 


CK-MB (CK-2)   2.04


 


Troponin I   0.013


 


NT-Pro-B Natriuret Pep  











Impressions: 


                                        





Cervical Spine CT  08/22/20 12:18


IMPRESSION:  No acute fracture or dislocation of the cervical spine.  Multilevel

spondylosis, degenerative disc disease, and facet arthropathy.


 








Head CT  08/22/20 12:18


IMPRESSION:  No acute intracranial hemorrhage, mass, or evidence of acute 

territorial infarct.


EVIDENCE OF ACUTE STROKE: NO.


 








Chest CT  08/22/20 12:21


IMPRESSION:


1. Although there is motion in the pelvis, there appears to be an acute impacted

subcapital fracture of the right proximal femur.  Further evaluation with 

dedicated two view radiograph is recommended.


2. Age-indeterminate moderate compression fracture at T12, however this is s

table since 8/6/2020 radiograph.  This may be subacute or chronic.  No 

retropulsed fracture fragments.


3. Acute appearing fractures of the anterolateral left 8-10th ribs.


4. Patchy areas of consolidation with areas of nodular consolidation in both 

lungs, suggestive of infectious/ inflammatory process.  Given nodular 

components, a follow-up CT of the chest in 3 months is recommended to confirm 

complete resolution.  Commonly reported imaging features of COVID-19  pneumonia 

are present. Other processes such as influenza pneumonia and organizing 

pneumonia, as can be seen with drug toxicity and connective tissue disease, can 

cause a similar imaging pattern.


5. No evidence of acute traumatic solid organ or vascular injury in the chest, 

abdomen or pelvis.


 








Abdomen/Pelvis CT  08/22/20 12:23


IMPRESSION:


1. Although there is motion in the pelvis, there appears to be an acute impacted

subcapital fracture of the right proximal femur.  Further evaluation with 

dedicated two view radiograph is recommended.


2. Age-indeterminate moderate compression fracture at T12, however this is 

stable since 8/6/2020 radiograph.  This may be subacute or chronic.  No 

retropulsed fracture fragments.


3. Acute appearing fractures of the anterolateral left 8-10th ribs.


4. Patchy areas of consolidation with areas of nodular consolidation in both 

lungs, suggestive of infectious/ inflammatory process.  Given nodular 

components, a follow-up CT of the chest in 3 months is recommended to confirm 

complete resolution.  Commonly reported imaging features of COVID-19  pneumonia 

are present. Other processes such as influenza pneumonia and organizing 

pneumonia, as can be seen with drug toxicity and connective tissue disease, can 

cause a similar imaging pattern.


5. No evidence of acute traumatic solid organ or vascular injury in the chest, 

abdomen or pelvis.


 








Hip/Pelvis X-Ray  08/22/20 14:33


IMPRESSION:


1. Acute subcapital fracture right femur.


2. Moderate osteopenia.


 








Skeletal Survey  08/25/20 07:00


IMPRESSION:  1.  Acute/subacute impacted fracture of the right femoral neck.


2.  Findings as described.


 








Chest X-Ray  09/03/20 00:00


IMPRESSION:  Improving pneumonia.


 














Assessment & Plan





- Diagnosis


(1) Recurrent falls


Is this a current diagnosis for this admission?: Yes   





(2) Subcapital fracture of right femur


Qualifiers: 


   Encounter type: initial encounter   Fracture type: closed   Qualified 

Code(s): S72.011A - Unspecified intracapsular fracture of right femur, initial 

encounter for closed fracture   


Is this a current diagnosis for this admission?: Yes   





(3) Pneumonia due to COVID-19 virus


Is this a current diagnosis for this admission?: Yes   


Plan: 


Continue current supportive care.








(4) ARF (acute renal failure)


Qualifiers: 


   Acute renal failure type: unspecified   Qualified Code(s): N17.9 - Acute 

kidney failure, unspecified   


Is this a current diagnosis for this admission?: Yes   


Plan: 


Improved renal indices.








(5) Hypocalcemia


Is this a current diagnosis for this admission?: Yes   


Plan: 


Replacement therapy in progress. Repeat BMP in AM.








(6) Hypokalemia


Is this a current diagnosis for this admission?: Yes   


Plan: 


Replacement therapy in progress. Repeat BMP in AM.








(7) Hypomagnesemia


Is this a current diagnosis for this admission?: Yes   


Plan: 


Replacement therapy in progress. Repeat BMP in AM.








(8) Left rib fracture


Qualifiers: 


   Encounter type: initial encounter   Fracture type: closed 


Is this a current diagnosis for this admission?: Yes   





(9) Thyromegaly


Is this a current diagnosis for this admission?: Yes   





- Time


Time Spent with patient: 25-34 minutes


Level of Care: IMCU


Medications reviewed and adjusted accordingly: Yes


Anticipated discharge: SNF


Anticipated DC Timeframe: within 72 hours





- Inpatient Certification


Based on my medical assessment, after consideration of the patient's 

comorbidities, presenting symptoms, or acuity I expect that the services needed 

warrant INPATIENT care.: Yes


I certify that my determination is in accordance with my understanding of 

Medicare's requirements for reasonable and necessary INPATIENT services [42 CFR 

412.3e].: Yes


Medical Necessity: Significant Comorbidiites Make Outpatient Treatment Too 

Risky, Need Close Monitoring Due to Risk of Patient Decompensation, Need For IV 

Fluids, Need For Continuous Telemetry Monitoring, Need for Pain Control, Need 

for IV Antibiotics, Need for Surgery, Risk of Complication if Not Cared For in 

Hospital, Risk of Diagnosis Which Will Require Inpatient Eval/Care/Monitoring


Post Hospital Care: D/C or Transfer Summary





- Plan Summary


Plan Summary: 





Continue current medication management. Electrolytes replacement in progress. 

Obtain BMP and Mag level in AM.

## 2020-09-06 NOTE — PDOC PROGRESS REPORT
Subjective


Progress Note for:: 09/06/20


Subjective:: 





Electrolytes derangement persist despite replacement efforts. No reported fever 

or chills. No chest pain or difficulty with breathing. No nausea, vomiting, or 

abdominal pain.


Reason For Visit: 


RECURRENT FALLS,RIGHT FEMORAL FRACTURE,PNEUMONIA,








Physical Exam


Vital Signs: 


                                        











Temp Pulse Resp BP Pulse Ox


 


 98.3 F   91   18   149/82 H  94 


 


 09/06/20 10:00  09/06/20 07:33  09/05/20 23:59  09/06/20 07:33  09/06/20 07:33








                                 Intake & Output











 09/05/20 09/06/20 09/07/20





 06:59 06:59 06:59


 


Intake Total 1510 1840 


 


Output Total 1200 1100 


 


Balance 310 740 


 


Weight 70.9 kg 71.5 kg 











Physical Exam: 





General appearance: PRESENT: no acute distress


Head exam: PRESENT: atraumatic, normocephalic


Eye exam: PRESENT: conjunctiva pink.  ABSENT: pallor, sclera icterus


Mouth exam: PRESENT: moist


Respiratory exam: PRESENT: clear to auscultation beatrice


Cardiovascular exam: PRESENT: RRR, +S1, +S2.  ABSENT: diastolic murmur, rubs, 

systolic murmur


GI/Abdominal exam: PRESENT: normal bowel sounds, soft.  ABSENT: tenderness


Musculoskeletal exam: PRESENT: deformity - external rotation of right lower 

extremity, tenderness - right hip joint


Neurological exam: PRESENT: alert, awake, oriented to person, oriented to place,

oriented to time, oriented to situation, CN II-XII grossly intact.  ABSENT: 

motor sensory deficit


Psychiatric exam: PRESENT: appropriate affect, normal mood.  ABSENT: homicidal 

ideation, suicidal ideation


Skin exam: PRESENT: dry, warm





Results


Laboratory Results: 


                                        





                                 09/04/20 09:20 





                                 09/06/20 07:23 





                                        











  09/06/20





  07:23


 


Sodium  145.0


 


Potassium  2.8 L*


 


Chloride  114 H


 


Carbon Dioxide  24


 


Anion Gap  7


 


BUN  25 H


 


Creatinine  1.13


 


Est GFR (African Amer)  > 60


 


Glucose  90


 


Calcium  6.4 L*


 


Total Bilirubin  0.5


 


AST  20


 


Alkaline Phosphatase  63


 


Total Protein  4.6 L


 


Albumin  2.2 L








                                        











  08/22/20 08/22/20 08/22/20





  11:40 11:40 12:38


 


Creatine Kinase  Cancelled   1513 H


 


CK-MB (CK-2)   


 


Troponin I   Cancelled 


 


NT-Pro-B Natriuret Pep   














  08/22/20 08/22/20 08/22/20





  12:38 12:38 16:55


 


Creatine Kinase   


 


CK-MB (CK-2)   


 


Troponin I  0.122   0.111


 


NT-Pro-B Natriuret Pep   4610 H 














  08/29/20 08/29/20





  02:31 02:31


 


Creatine Kinase  106 


 


CK-MB (CK-2)   2.04


 


Troponin I   0.013


 


NT-Pro-B Natriuret Pep  











Impressions: 


                                        





Cervical Spine CT  08/22/20 12:18


IMPRESSION:  No acute fracture or dislocation of the cervical spine.  Multilevel

spondylosis, degenerative disc disease, and facet arthropathy.


 








Head CT  08/22/20 12:18


IMPRESSION:  No acute intracranial hemorrhage, mass, or evidence of acute 

territorial infarct.


EVIDENCE OF ACUTE STROKE: NO.


 








Chest CT  08/22/20 12:21


IMPRESSION:


1. Although there is motion in the pelvis, there appears to be an acute impacted

subcapital fracture of the right proximal femur.  Further evaluation with 

dedicated two view radiograph is recommended.


2. Age-indeterminate moderate compression fracture at T12, however this is 

stable since 8/6/2020 radiograph.  This may be subacute or chronic.  No retr

opulsed fracture fragments.


3. Acute appearing fractures of the anterolateral left 8-10th ribs.


4. Patchy areas of consolidation with areas of nodular consolidation in both 

lungs, suggestive of infectious/ inflammatory process.  Given nodular 

components, a follow-up CT of the chest in 3 months is recommended to confirm 

complete resolution.  Commonly reported imaging features of COVID-19  pneumonia 

are present. Other processes such as influenza pneumonia and organizing 

pneumonia, as can be seen with drug toxicity and connective tissue disease, can 

cause a similar imaging pattern.


5. No evidence of acute traumatic solid organ or vascular injury in the chest, 

abdomen or pelvis.


 








Abdomen/Pelvis CT  08/22/20 12:23


IMPRESSION:


1. Although there is motion in the pelvis, there appears to be an acute impacted

subcapital fracture of the right proximal femur.  Further evaluation with 

dedicated two view radiograph is recommended.


2. Age-indeterminate moderate compression fracture at T12, however this is 

stable since 8/6/2020 radiograph.  This may be subacute or chronic.  No 

retropulsed fracture fragments.


3. Acute appearing fractures of the anterolateral left 8-10th ribs.


4. Patchy areas of consolidation with areas of nodular consolidation in both 

lungs, suggestive of infectious/ inflammatory process.  Given nodular 

components, a follow-up CT of the chest in 3 months is recommended to confirm 

complete resolution.  Commonly reported imaging features of COVID-19  pneumonia 

are present. Other processes such as influenza pneumonia and organizing 

pneumonia, as can be seen with drug toxicity and connective tissue disease, can 

cause a similar imaging pattern.


5. No evidence of acute traumatic solid organ or vascular injury in the chest, 

abdomen or pelvis.


 








Hip/Pelvis X-Ray  08/22/20 14:33


IMPRESSION:


1. Acute subcapital fracture right femur.


2. Moderate osteopenia.


 








Skeletal Survey  08/25/20 07:00


IMPRESSION:  1.  Acute/subacute impacted fracture of the right femoral neck.


2.  Findings as described.


 








Chest X-Ray  09/03/20 00:00


IMPRESSION:  Improving pneumonia.


 














Assessment & Plan





- Diagnosis


(1) Recurrent falls


Is this a current diagnosis for this admission?: Yes   





(2) Subcapital fracture of right femur


Qualifiers: 


   Encounter type: initial encounter   Fracture type: closed   Qualified 

Code(s): S72.011A - Unspecified intracapsular fracture of right femur, initial 

encounter for closed fracture   


Is this a current diagnosis for this admission?: Yes   





(3) Pneumonia due to COVID-19 virus


Is this a current diagnosis for this admission?: Yes   





(4) ARF (acute renal failure)


Qualifiers: 


   Acute renal failure type: unspecified   Qualified Code(s): N17.9 - Acute 

kidney failure, unspecified   


Is this a current diagnosis for this admission?: Yes   





(5) Hypocalcemia


Is this a current diagnosis for this admission?: Yes   





(6) Hypokalemia


Is this a current diagnosis for this admission?: Yes   





(7) Hypomagnesemia


Is this a current diagnosis for this admission?: Yes   





(8) Left rib fracture


Qualifiers: 


   Encounter type: initial encounter   Fracture type: closed 


Is this a current diagnosis for this admission?: Yes   





(9) Thyromegaly


Is this a current diagnosis for this admission?: Yes   





- Time


Time Spent with patient: 25-34 minutes


Level of Care: IMCU


Medications reviewed and adjusted accordingly: Yes


Anticipated discharge: Home with Homehealth


Anticipated DC Timeframe: within 72 hours





- Inpatient Certification


Based on my medical assessment, after consideration of the patient's 

comorbidities, presenting symptoms, or acuity I expect that the services needed 

warrant INPATIENT care.: Yes


I certify that my determination is in accordance with my understanding of 

Medicare's requirements for reasonable and necessary INPATIENT services [42 CFR 

412.3e].: Yes


Medical Necessity: Significant Comorbidiites Make Outpatient Treatment Too 

Risky, Need Close Monitoring Due to Risk of Patient Decompensation, Need For IV 

Fluids, Risk of Complication if Not Cared For in Hospital, Risk of Diagnosis 

Which Will Require Inpatient Eval/Care/Monitoring





- Plan Summary


Plan Summary: 





Continue current medication management and electrolytes replacement efforts. 

Possible surgical intervention asap with electrolyte correction.

## 2020-09-07 NOTE — PDOC PROGRESS REPORT
Subjective


Progress Note for:: 09/07/20


Subjective:: 





His electrolytes derangement remain a problem with some improvement. No chest 

pain or difficulty with breathing.  No reported fever or chills. No nausea, 

vomiting, or abdominal pain.


Reason For Visit: 


RECURRENT FALLS,RIGHT FEMORAL FRACTURE,PNEUMONIA,








Physical Exam


Vital Signs: 


                                        











Temp Pulse Resp BP Pulse Ox


 


 98.2 F   93   20   156/84 H  96 


 


 09/07/20 03:47  09/07/20 07:00  09/07/20 03:47  09/07/20 03:47  09/07/20 03:47








                                 Intake & Output











 09/06/20 09/07/20 09/08/20





 06:59 06:59 06:59


 


Intake Total 2840 530 616.66


 


Output Total 1100 1625 


 


Balance 1740 -1095 616.66


 


Weight 71.5 kg 72.2 kg 











Physical Exam: 





General appearance: PRESENT: no acute distress


Head exam: PRESENT: atraumatic, normocephalic


Eye exam: PRESENT: conjunctiva pink.  ABSENT: pallor, sclera icterus


Mouth exam: PRESENT: moist


Respiratory exam: PRESENT: clear to auscultation beatrice


Cardiovascular exam: PRESENT: RRR, +S1, +S2.  ABSENT: diastolic murmur, rubs, 

systolic murmur


GI/Abdominal exam: PRESENT: normal bowel sounds, soft.  ABSENT: tenderness


Musculoskeletal exam: PRESENT: deformity - external rotation of right lower 

extremity, tenderness - right hip joint


Neurological exam: PRESENT: alert, awake, oriented to person, oriented to place,

oriented to time, oriented to situation, CN II-XII grossly intact.  ABSENT: 

motor sensory deficit


Psychiatric exam: PRESENT: appropriate affect, normal mood.  ABSENT: homicidal 

ideation, suicidal ideation


Skin exam: PRESENT: dry, warm





Results


Laboratory Results: 


                                        





                                 09/04/20 09:20 





                                 09/07/20 08:28 





                                        











  09/07/20





  08:28


 


Sodium  145.8 H


 


Potassium  3.1 L


 


Chloride  115 H


 


Carbon Dioxide  23


 


Anion Gap  8


 


BUN  23 H


 


Creatinine  1.19


 


Est GFR (African Amer)  > 60


 


Glucose  91


 


Calcium  7.2 L


 


Total Bilirubin  0.6


 


AST  18


 


Alkaline Phosphatase  66


 


Total Protein  4.8 L


 


Albumin  2.3 L








                                        











  08/22/20 08/22/20 08/22/20





  11:40 11:40 12:38


 


Creatine Kinase  Cancelled   1513 H


 


CK-MB (CK-2)   


 


Troponin I   Cancelled 


 


NT-Pro-B Natriuret Pep   














  08/22/20 08/22/20 08/22/20





  12:38 12:38 16:55


 


Creatine Kinase   


 


CK-MB (CK-2)   


 


Troponin I  0.122   0.111


 


NT-Pro-B Natriuret Pep   4610 H 














  08/29/20 08/29/20





  02:31 02:31


 


Creatine Kinase  106 


 


CK-MB (CK-2)   2.04


 


Troponin I   0.013


 


NT-Pro-B Natriuret Pep  











Impressions: 


                                        





Cervical Spine CT  08/22/20 12:18


IMPRESSION:  No acute fracture or dislocation of the cervical spine.  Multilevel

spondylosis, degenerative disc disease, and facet arthropathy.


 








Head CT  08/22/20 12:18


IMPRESSION:  No acute intracranial hemorrhage, mass, or evidence of acute 

territorial infarct.


EVIDENCE OF ACUTE STROKE: NO.


 








Chest CT  08/22/20 12:21


IMPRESSION:


1. Although there is motion in the pelvis, there appears to be an acute impacted

subcapital fracture of the right proximal femur.  Further evaluation with 

dedicated two view radiograph is recommended.


2. Age-indeterminate moderate compression fracture at T12, however this is 

stable since 8/6/2020 radiograph.  This may be subacute or chronic.  No 

retropulsed fracture fragments.


3. Acute appearing fractures of the anterolateral left 8-10th ribs.


4. Patchy areas of consolidation with areas of nodular consolidation in both 

lungs, suggestive of infectious/ inflammatory process.  Given nodular 

components, a follow-up CT of the chest in 3 months is recommended to confirm 

complete resolution.  Commonly reported imaging features of COVID-19  pneumonia 

are present. Other processes such as influenza pneumonia and organizing pneum

onia, as can be seen with drug toxicity and connective tissue disease, can cause

a similar imaging pattern.


5. No evidence of acute traumatic solid organ or vascular injury in the chest, 

abdomen or pelvis.


 








Abdomen/Pelvis CT  08/22/20 12:23


IMPRESSION:


1. Although there is motion in the pelvis, there appears to be an acute impacted

subcapital fracture of the right proximal femur.  Further evaluation with 

dedicated two view radiograph is recommended.


2. Age-indeterminate moderate compression fracture at T12, however this is 

stable since 8/6/2020 radiograph.  This may be subacute or chronic.  No 

retropulsed fracture fragments.


3. Acute appearing fractures of the anterolateral left 8-10th ribs.


4. Patchy areas of consolidation with areas of nodular consolidation in both 

lungs, suggestive of infectious/ inflammatory process.  Given nodular 

components, a follow-up CT of the chest in 3 months is recommended to confirm 

complete resolution.  Commonly reported imaging features of COVID-19  pneumonia 

are present. Other processes such as influenza pneumonia and organizing 

pneumonia, as can be seen with drug toxicity and connective tissue disease, can 

cause a similar imaging pattern.


5. No evidence of acute traumatic solid organ or vascular injury in the chest, 

abdomen or pelvis.


 








Hip/Pelvis X-Ray  08/22/20 14:33


IMPRESSION:


1. Acute subcapital fracture right femur.


2. Moderate osteopenia.


 








Skeletal Survey  08/25/20 07:00


IMPRESSION:  1.  Acute/subacute impacted fracture of the right femoral neck.


2.  Findings as described.


 








Chest X-Ray  09/03/20 00:00


IMPRESSION:  Improving pneumonia.


 














Assessment & Plan





- Diagnosis


(1) Recurrent falls


Is this a current diagnosis for this admission?: Yes   





(2) Subcapital fracture of right femur


Qualifiers: 


   Encounter type: initial encounter   Fracture type: closed   Qualified 

Code(s): S72.011A - Unspecified intracapsular fracture of right femur, initial 

encounter for closed fracture   


Is this a current diagnosis for this admission?: Yes   





(3) Pneumonia due to COVID-19 virus


Is this a current diagnosis for this admission?: Yes   





(4) ARF (acute renal failure)


Qualifiers: 


   Acute renal failure type: unspecified   Qualified Code(s): N17.9 - Acute 

kidney failure, unspecified   


Is this a current diagnosis for this admission?: Yes   





(5) Hypocalcemia


Is this a current diagnosis for this admission?: Yes   





(6) Hypokalemia


Is this a current diagnosis for this admission?: Yes   





(7) Hypomagnesemia


Is this a current diagnosis for this admission?: Yes   





(8) Left rib fracture


Qualifiers: 


   Encounter type: initial encounter   Fracture type: closed 


Is this a current diagnosis for this admission?: Yes   





(9) Thyromegaly


Is this a current diagnosis for this admission?: Yes   





- Time


Time Spent with patient: 25-34 minutes


Level of Care: IMCU


Medications reviewed and adjusted accordingly: Yes


Anticipated discharge: SNF


Anticipated DC Timeframe: within 72 hours





- Inpatient Certification


Based on my medical assessment, after consideration of the patient's 

comorbidities, presenting symptoms, or acuity I expect that the services needed 

warrant INPATIENT care.: Yes


I certify that my determination is in accordance with my understanding of 

Medicare's requirements for reasonable and necessary INPATIENT services [42 CFR 

412.3e].: Yes


Medical Necessity: Significant Comorbidiites Make Outpatient Treatment Too 

Risky, Need Close Monitoring Due to Risk of Patient Decompensation, Need For IV 

Fluids, Need For Continuous Telemetry Monitoring, Need for Surgery, Risk of 

Complication if Not Cared For in Hospital, Risk of Diagnosis Which Will Require 

Inpatient Eval/Care/Monitoring


Post Hospital Care: D/C or Transfer Summary





- Plan Summary


Plan Summary: 





See covering attending physician orders for details about care plan.

## 2020-09-08 NOTE — PROGRESS NOTE
Provider Note


Provider Note: 





Patient is scheduled for hemiarthroplasty 09/09/2020 at 0730.


The anesthesiologist has reviewed the patient's labs and will proceed with 

surgery.

## 2020-09-08 NOTE — PDOC PROGRESS REPORT
Subjective


Progress Note for:: 09/08/20


Subjective:: 





Patient seen today by the bedside, is scheduled for surgery tomorrow morning.


Reason For Visit: 


RECURRENT FALLS,RIGHT FEMORAL FRACTURE,PNEUMONIA,








Physical Exam


Vital Signs: 


                                        











Temp Pulse Resp BP Pulse Ox


 


 97.7 F   75   18   170/75 H  97 


 


 09/08/20 16:15  09/08/20 16:15  09/08/20 16:15  09/08/20 16:15  09/08/20 16:15








                                 Intake & Output











 09/07/20 09/08/20 09/09/20





 06:59 06:59 06:59


 


Intake Total 530 2533.32 360


 


Output Total 1625 450 


 


Balance -1095 2083.32 360


 


Weight 72.2 kg 84.1 kg 











General appearance: PRESENT: no acute distress


Eye exam: PRESENT: PERRLA


Respiratory exam: PRESENT: clear to auscultation beatrice


Cardiovascular exam: PRESENT: +S1, +S2


GI/Abdominal exam: PRESENT: soft


Neurological exam: PRESENT: alert





Results


Laboratory Results: 


                                        





                                 09/04/20 09:20 





                                 09/08/20 09:30 





                                        











  09/07/20 09/08/20





  19:30 09:30


 


Sodium   144.2


 


Potassium   3.3 L


 


Chloride   116 H


 


Carbon Dioxide   20 L


 


Anion Gap   8


 


BUN   22 H


 


Creatinine   1.07


 


Est GFR (African Amer)   > 60


 


Glucose   109


 


Calcium   7.6 L


 


Total Bilirubin   0.6


 


AST   20


 


Alkaline Phosphatase   75


 


Total Protein   4.6 L


 


Albumin   2.2 L


 


Stl C.difficile Tox PCR  POSITIVE 








                                        











  08/22/20 08/22/20 08/22/20





  11:40 11:40 12:38


 


Creatine Kinase  Cancelled   1513 H


 


CK-MB (CK-2)   


 


Troponin I   Cancelled 


 


NT-Pro-B Natriuret Pep   














  08/22/20 08/22/20 08/22/20





  12:38 12:38 16:55


 


Creatine Kinase   


 


CK-MB (CK-2)   


 


Troponin I  0.122   0.111


 


NT-Pro-B Natriuret Pep   4610 H 














  08/29/20 08/29/20





  02:31 02:31


 


Creatine Kinase  106 


 


CK-MB (CK-2)   2.04


 


Troponin I   0.013


 


NT-Pro-B Natriuret Pep  











Impressions: 


                                        





Cervical Spine CT  08/22/20 12:18


IMPRESSION:  No acute fracture or dislocation of the cervical spine.  Multilevel

spondylosis, degenerative disc disease, and facet arthropathy.


 








Head CT  08/22/20 12:18


IMPRESSION:  No acute intracranial hemorrhage, mass, or evidence of acute 

territorial infarct.


EVIDENCE OF ACUTE STROKE: NO.


 








Chest CT  08/22/20 12:21


IMPRESSION:


1. Although there is motion in the pelvis, there appears to be an acute impacted

subcapital fracture of the right proximal femur.  Further evaluation with 

dedicated two view radiograph is recommended.


2. Age-indeterminate moderate compression fracture at T12, however this is 

stable since 8/6/2020 radiograph.  This may be subacute or chronic.  No 

retropulsed fracture fragments.


3. Acute appearing fractures of the anterolateral left 8-10th ribs.


4. Patchy areas of consolidation with areas of nodular consolidation in both 

lungs, suggestive of infectious/ inflammatory process.  Given nodular 

components, a follow-up CT of the chest in 3 months is recommended to confirm 

complete resolution.  Commonly reported imaging features of COVID-19  pneumonia 

are present. Other processes such as influenza pneumonia and organizing 

pneumonia, as can be seen with drug toxicity and connective tissue disease, can 

cause a similar imaging pattern.


5. No evidence of acute traumatic solid organ or vascular injury in the chest, 

abdomen or pelvis.


 








Abdomen/Pelvis CT  08/22/20 12:23


IMPRESSION:


1. Although there is motion in the pelvis, there appears to be an acute impacted

subcapital fracture of the right proximal femur.  Further evaluation with dedic

ated two view radiograph is recommended.


2. Age-indeterminate moderate compression fracture at T12, however this is 

stable since 8/6/2020 radiograph.  This may be subacute or chronic.  No 

retropulsed fracture fragments.


3. Acute appearing fractures of the anterolateral left 8-10th ribs.


4. Patchy areas of consolidation with areas of nodular consolidation in both 

lungs, suggestive of infectious/ inflammatory process.  Given nodular 

components, a follow-up CT of the chest in 3 months is recommended to confirm 

complete resolution.  Commonly reported imaging features of COVID-19  pneumonia 

are present. Other processes such as influenza pneumonia and organizing 

pneumonia, as can be seen with drug toxicity and connective tissue disease, can 

cause a similar imaging pattern.


5. No evidence of acute traumatic solid organ or vascular injury in the chest, 

abdomen or pelvis.


 








Hip/Pelvis X-Ray  08/22/20 14:33


IMPRESSION:


1. Acute subcapital fracture right femur.


2. Moderate osteopenia.


 








Skeletal Survey  08/25/20 07:00


IMPRESSION:  1.  Acute/subacute impacted fracture of the right femoral neck.


2.  Findings as described.


 








Chest X-Ray  09/03/20 00:00


IMPRESSION:  Improving pneumonia.


 














Assessment & Plan





- Diagnosis


(1) Sepsis


Qualifiers: 


   Sepsis type: methicillin susceptible Staphylococcus aureus   Sepsis acute 

organ dysfunction status: with acute organ dysfunction   Severe sepsis acute 

organ dysfunction type: acute renal failure   Acute renal failure type: with 

acute tubular necrosis   Severe sepsis shock status: without septic shock   

Qualified Code(s): A41.01 - Sepsis due to Methicillin susceptible Staphylococcus

aureus; R65.20 - Severe sepsis without septic shock; N17.0 - Acute kidney manuel

lure with tubular necrosis   


Is this a current diagnosis for this admission?: Yes   





(2) Pneumonia due to COVID-19 virus


Is this a current diagnosis for this admission?: Yes   





(3) Staphylococcus aureus septicemia


Is this a current diagnosis for this admission?: Yes   





(4) Acute kidney injury


Is this a current diagnosis for this admission?: Yes   





(5) Multiple fractures of ribs of left side


Qualifiers: 


   Encounter type: initial encounter   Fracture type: closed   Qualified 

Code(s): S22.42XA - Multiple fractures of ribs, left side, initial encounter for

closed fracture   


Is this a current diagnosis for this admission?: Yes   





(6) Compression fracture of T12 vertebra


Qualifiers: 


   Encounter type: initial encounter   Qualified Code(s): S22.080A - Wedge 

compression fracture of T11-T12 vertebra, initial encounter for closed fracture 

 


Is this a current diagnosis for this admission?: Yes   


Plan: 


due to osteoporosis 








(7) Pathologic fracture


Qualifiers: 


   Pathology associated with fracture: osteoporosis   Osteoporosis type: age-

related   Site of pathological fracture: vertebra   Encounter type: initial 

encounter   Qualified Code(s): M80.08XA - Age-related osteoporosis with current 

pathological fracture, vertebra(e), initial encounter for fracture   


Is this a current diagnosis for this admission?: Yes   


Plan: 


He has osteoporotic fracture








(8) Hypocalcemia


Is this a current diagnosis for this admission?: Yes   





(9) Metabolic acidosis


Is this a current diagnosis for this admission?: Yes   


Plan: 


Resolved








(10) Subcapital fracture of right femur


Qualifiers: 


   Encounter type: initial encounter   Fracture type: closed   Qualified 

Code(s): S72.011A - Unspecified intracapsular fracture of right femur, initial 

encounter for closed fracture   


Is this a current diagnosis for this admission?: Yes   





(11) Secondary hyperparathyroidism


Is this a current diagnosis for this admission?: Yes   





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


Is this a current diagnosis for this admission?: Yes   





- Time


Time Spent with patient: 25-34 minutes


Level of Care: IMCU


Medications reviewed and adjusted accordingly: Yes


Anticipated discharge: Home


Anticipated DC Timeframe: within 72 hours





- Inpatient Certification


Based on my medical assessment, after consideration of the patient's 

comorbidities, presenting symptoms, or acuity I expect that the services needed 

warrant INPATIENT care.: Yes


I certify that my determination is in accordance with my understanding of 

Medicare's requirements for reasonable and necessary INPATIENT services [42 CFR 

412.3e].: Yes

## 2020-09-09 NOTE — RADIOLOGY REPORT (SQ)
EXAM DESCRIPTION:  HIP RIGHT AP/LATERAL



IMAGES COMPLETED DATE/TIME:  9/9/2020 5:44 pm



REASON FOR STUDY:  postop hemiarthroplasty



COMPARISON:  None.



NUMBER OF VIEWS:  Two views.



TECHNIQUE:  AP pelvis and additional frog legview of the right hip.



LIMITATIONS:  None.



FINDINGS:  MINERALIZATION: Normal.

RIGHT HIP: Right hemiarthroplasty.

LEFT HIP: Prior ORIF left hip.

PUBIS AND ISCHIUM: No fracture.

PELVIS: No fracture.

SACRUM: No fracture or dislocation. No worrisome bone lesions.

LOWER LUMBAR SPINE: No fracture or dislocation. No worrisome bone lesions.  No significant disc disea
se.

SOFT TISSUES: No findings.

OTHER: No other significant finding.



IMPRESSION:  Right hip hemiarthroplasty.  Refer to operative note for further information.



TECHNICAL DOCUMENTATION:  JOB ID:  [id number]

 2011 [employer]- All Rights Reserved



Reading location - IP/workstation name: [email or web address]

## 2020-09-09 NOTE — PDOC PROGRESS REPORT
Subjective


Progress Note for:: 09/09/20


Subjective:: 


Patient seen by the bedside status post ORIF of the right hip joint,, I spoke to

the patient spouse about this condition, the plan is for patient to go to rehab,

family prefer for patient to stay in [facility], [facility] 





Reason For Visit: 


RECURRENT FALLS,RIGHT FEMORAL FRACTURE,PNEUMONIA,








Physical Exam


Vital Signs: 


                                        











Temp Pulse Resp BP Pulse Ox


 


 97.5 F   92   16   116/66   95 


 


 09/09/20 16:08  09/09/20 16:08  09/09/20 16:08  09/09/20 16:08  09/09/20 16:08








                                 Intake & Output











 09/08/20 09/09/20 09/10/20





 06:59 06:59 06:59


 


Intake Total 2533.32 1390 2100


 


Output Total 450 800 25


 


Balance 2083.32 590 2075


 


Weight 84.1 kg 84.1 kg 











General appearance: PRESENT: no acute distress


Respiratory exam: PRESENT: clear to auscultation beatrice


Cardiovascular exam: PRESENT: +S1, +S2


GI/Abdominal exam: PRESENT: soft


Neurological exam: PRESENT: alert, CN II-XII grossly intact





Results


Laboratory Results: 


                                        





                                 09/04/20 09:20 





                                 09/09/20 08:49 





                                        











  09/09/20





  08:49


 


Sodium  145.0


 


Potassium  2.5 L*


 


Chloride  117 H


 


Carbon Dioxide  23


 


Anion Gap  5


 


BUN  18


 


Creatinine  1.04


 


Est GFR (African Amer)  > 60


 


Glucose  82


 


Calcium  6.6 L*


 


Total Bilirubin  0.5


 


AST  16 L


 


Alkaline Phosphatase  66


 


Total Protein  4.2 L


 


Albumin  2.0 L








                                        











  08/22/20 08/22/20 08/22/20





  11:40 11:40 12:38


 


Creatine Kinase  Cancelled   1513 H


 


CK-MB (CK-2)   


 


Troponin I   Cancelled 


 


NT-Pro-B Natriuret Pep   














  08/22/20 08/22/20 08/22/20





  12:38 12:38 16:55


 


Creatine Kinase   


 


CK-MB (CK-2)   


 


Troponin I  0.122   0.111


 


NT-Pro-B Natriuret Pep   4610 H 














  08/29/20 08/29/20





  02:31 02:31


 


Creatine Kinase  106 


 


CK-MB (CK-2)   2.04


 


Troponin I   0.013


 


NT-Pro-B Natriuret Pep  











Impressions: 


                                        





Cervical Spine CT  08/22/20 12:18


IMPRESSION:  No acute fracture or dislocation of the cervical spine.  Multilevel

spondylosis, degenerative disc disease, and facet arthropathy.


 








Head CT  08/22/20 12:18


IMPRESSION:  No acute intracranial hemorrhage, mass, or evidence of acute 

territorial infarct.


EVIDENCE OF ACUTE STROKE: NO.


 








Chest CT  08/22/20 12:21


IMPRESSION:


1. Although there is motion in the pelvis, there appears to be an acute impacted

subcapital fracture of the right proximal femur.  Further evaluation with 

dedicated two view radiograph is recommended.


2. Age-indeterminate moderate compression fracture at T12, however this is 

stable since 8/6/2020 radiograph.  This may be subacute or chronic.  No 

retropulsed fracture fragments.


3. Acute appearing fractures of the anterolateral left 8-10th ribs.


4. Patchy areas of consolidation with areas of nodular consolidation in both 

lungs, suggestive of infectious/ inflammatory process.  Given nodular 

components, a follow-up CT of the chest in 3 months is recommended to confirm 

complete resolution.  Commonly reported imaging features of COVID-19  pneumonia 

are present. Other processes such as influenza pneumonia and organizing 

pneumonia, as can be seen with drug toxicity and connective tissue disease, can 

cause a similar imaging pattern.


5. No evidence of acute traumatic solid organ or vascular injury in the chest, 

abdomen or pelvis.


 








Abdomen/Pelvis CT  08/22/20 12:23


IMPRESSION:


1. Although there is motion in the pelvis, there appears to be an acute impacted

subcapital fracture of the right proximal femur.  Further evaluation with 

dedicated two view radiograph is recommended.


2. Age-indeterminate moderate compression fracture at T12, however this is 

stable since 8/6/2020 radiograph.  This may be subacute or chronic.  No 

retropulsed fracture fragments.


3. Acute appearing fractures of the anterolateral left 8-10th ribs.


4. Patchy areas of consolidation with areas of nodular consolidation in both 

lungs, suggestive of infectious/ inflammatory process.  Given nodular 

components, a follow-up CT of the chest in 3 months is recommended to confirm 

complete resolution.  Commonly reported imaging features of COVID-19  pneumonia 

are present. Other processes such as influenza pneumonia and organizing 

pneumonia, as can be seen with drug toxicity and connective tissue disease, can 

cause a similar imaging pattern.


5. No evidence of acute traumatic solid organ or vascular injury in the chest, 

abdomen or pelvis.


 








Hip/Pelvis X-Ray  08/22/20 14:33


IMPRESSION:


1. Acute subcapital fracture right femur.


2. Moderate osteopenia.


 








Skeletal Survey  08/25/20 07:00


IMPRESSION:  1.  Acute/subacute impacted fracture of the right femoral neck.


2.  Findings as described.


 








Chest X-Ray  09/03/20 00:00


IMPRESSION:  Improving pneumonia.


 














Assessment & Plan





- Diagnosis


(1) Sepsis


Qualifiers: 


   Sepsis type: methicillin susceptible Staphylococcus aureus   Sepsis acute 

organ dysfunction status: with acute organ dysfunction   Severe sepsis acute 

organ dysfunction type: acute renal failure   Acute renal failure type: with 

acute tubular necrosis   Severe sepsis shock status: without septic shock   

Qualified Code(s): A41.01 - Sepsis due to Methicillin susceptible Staphylococcus

aureus; R65.20 - Severe sepsis without septic shock; N17.0 - Acute kidney 

failure with tubular necrosis   


Is this a current diagnosis for this admission?: Yes   





(2) Pneumonia due to COVID-19 virus


Is this a current diagnosis for this admission?: Yes   





(3) Staphylococcus aureus septicemia


Is this a current diagnosis for this admission?: Yes   





(4) Acute kidney injury


Is this a current diagnosis for this admission?: Yes   





(5) Multiple fractures of ribs of left side


Qualifiers: 


   Encounter type: initial encounter   Fracture type: closed   Qualified 

Code(s): S22.42XA - Multiple fractures of ribs, left side, initial encounter for

closed fracture   


Is this a current diagnosis for this admission?: Yes   





(6) Compression fracture of T12 vertebra


Qualifiers: 


   Encounter type: initial encounter   Qualified Code(s): S22.080A - Wedge 

compression fracture of T11-T12 vertebra, initial encounter for closed fracture 

 


Is this a current diagnosis for this admission?: Yes   





(7) Pathologic fracture


Qualifiers: 


   Pathology associated with fracture: osteoporosis   Osteoporosis type: age-

related   Site of pathological fracture: vertebra   Encounter type: initial 

encounter   Qualified Code(s): M80.08XA - Age-related osteoporosis with current 

pathological fracture, vertebra(e), initial encounter for fracture   


Is this a current diagnosis for this admission?: Yes   





(8) Hypocalcemia


Is this a current diagnosis for this admission?: Yes   


Plan: 


Replace calcium








(9) Metabolic acidosis


Is this a current diagnosis for this admission?: Yes   





(10) Subcapital fracture of right femur


Qualifiers: 


   Encounter type: initial encounter   Fracture type: closed   Qualified 

Code(s): S72.011A - Unspecified intracapsular fracture of right femur, initial 

encounter for closed fracture   


Is this a current diagnosis for this admission?: Yes   





(11) Secondary hyperparathyroidism


Is this a current diagnosis for this admission?: Yes   





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


Is this a current diagnosis for this admission?: Yes   


Plan: 


Consult Dr. Marc








(13) Clostridioides difficile diarrhea


Is this a current diagnosis for this admission?: Yes   


Plan: 


Continue vancomycin p.o.








(14) Hypokalemia


Is this a current diagnosis for this admission?: Yes   


Plan: 


Replace potassium








- Time


Time Spent with patient: 25-34 minutes


Level of Care: IMCU


Medications reviewed and adjusted accordingly: Yes


Anticipated discharge: SNF


Anticipated DC Timeframe: when bed available

## 2020-09-09 NOTE — OPERATIVE REPORT
Operative Report


DATE OF SURGERY: 09/09/20


PREOPERATIVE DIAGNOSIS: right hip displaced fracture of femoral neck


POSTOPERATIVE DIAGNOSIS: right hip displaced fracture of femoral neck


OPERATION: right hip hemiarthroplasty.  Patient is COVID positive


SURGEON: CHAD DOA


ANESTHESIA: GA


COMPLICATIONS: 





none


ESTIMATED BLOOD LOSS: 100cc


INTRAOPERATIVE FINDINGS: same


PROCEDURE: 





Indications for procedure: The patient is an 89-year-old man admitted to [facility] August 22, 2020 with sepsis, acute renal failure, and COVID 

positive.  Prior to admission he had fallen and sustained a displaced fracture 

of the right femoral neck.  His medical condition has just stabilized and he is 

clear for a right hip hemiarthroplasty





Description of procedure: Following the induction of a spinal anesthetic and 

administration of 2 g of Ancef the patient was positioned lateral on a pegboard.

 An axillary roll was placed.  All bony prominences were padded.  The right leg 

was first scrubbed with Hibiclens impregnated surgical scrub brushes.  It was 

then prepped with ChloraPrep and draped in standard fashion.  A posterior 

approach to the hip was performed.  Sharp incision was performed through skin 

with Bovie electrocautery through the subcutaneous tissue.  The fascia was split

and Charnley placed.  The external rotators were taken off the bone with Bovie 

electrocautery.  Femoral neck cut was made.  Femoral head was removed and sized.

 Box osteotome was used to open the canal.  Sequential broaching was then 

performed to a size 7.  Trials were placed with a 51 mm head 7 mm stem and -4 mm

neck.  The leg was taken through a full range of motion and was found to be 

quite stable.  Trials were removed.  Copious irrigation was performed with 

pulsatile lavage.  Urosept solution was then irrigated throughout the deep 

tissues.  The implants were then placed.  The leg was taken through a full range

of motion and the hip was quite stable.  Additional irrigation was performed 

with pulsatile lavage then followed by urosept.  The external rotators were 

repaired back through bone using #1 Ethibond.  1 g of vancomycin powder was 

placed in the deep tissue prior to closure of the fascia.  The fascia was then 

closed with #1 barbed PDS suture.  Additional urosept irrigation was performed. 

The subcutaneous tissue was then closed with 2-0 barbed PDS suture.  The skin 

was closed with 3-0 Monocryl suture and overlaid with a sterile adhesive 

dressing.  A sterile dressing was then applied.  The patient tolerated procedure

well without complications was brought recovery room in stable condition.





Implants:


Accolade 2 femoral stem size 7


Unitrax 51 mm head


Unitrax -4 mm neck

## 2020-09-10 NOTE — PDOC PROGRESS REPORT
Subjective


Progress Note for:: 09/10/20


Subjective:: 





The patient is complaining of moderate discomfort associated with h

emiarthroplasty surgery yesterday.


Reason For Visit: 


RECURRENT FALLS,RIGHT FEMORAL FRACTURE,PNEUMONIA,


Postoperative day #1 status post right hip hemiarthroplasty for fracture





Physical Exam


Vital Signs: 


                                        











Temp Pulse Resp BP Pulse Ox


 


 97.5 F   90   16   116/66   95 


 


 09/09/20 16:08  09/10/20 07:00  09/09/20 16:08  09/09/20 16:08  09/09/20 16:08








                                 Intake & Output











 09/09/20 09/10/20 09/11/20





 06:59 06:59 06:59


 


Intake Total 1390 3728 0


 


Output Total 800 1000 


 


Balance 590 2728 0


 


Weight 84.1 kg 85.8 kg 











General appearance: PRESENT: no acute distress


Head exam: PRESENT: atraumatic, normocephalic


Musculoskeletal exam: PRESENT: other - The surgical dressing is intact.  There 

is minimal discomfort with passive rotation of the right hip.  The patient is 

able to dorsiflex and plantarflex the ankle.  Sensation is intact to touch.





Results


Laboratory Results: 


                                        





                                 09/10/20 04:51 





                                 09/10/20 04:51 





                                        











  09/09/20 09/10/20 09/10/20





  08:49 04:51 04:51


 


WBC    11.0 H


 


RBC    2.65 L


 


Hgb    8.1 L


 


Hct    24.6 L


 


MCV    93


 


MCH    30.7


 


MCHC    33.1


 


RDW    14.5 H


 


Plt Count    115 L


 


Sodium  145.0  144.9 


 


Potassium  2.5 L*  3.2 L 


 


Chloride  117 H  117 H 


 


Carbon Dioxide  23  22 


 


Anion Gap  5  6 


 


BUN  18  21 H 


 


Creatinine  1.04  1.10 


 


Est GFR ( Amer)  > 60  > 60 


 


Glucose  82  112 H 


 


Calcium  6.6 L*  6.3 L* 


 


Total Bilirubin  0.5  0.5 


 


AST  16 L  17 


 


Alkaline Phosphatase  66  52 


 


Total Protein  4.2 L  4.0 L 


 


Albumin  2.0 L  1.9 L 








                                        











  08/22/20 08/22/20 08/22/20





  11:40 11:40 12:38


 


Creatine Kinase  Cancelled   1513 H


 


CK-MB (CK-2)   


 


Troponin I   Cancelled 


 


NT-Pro-B Natriuret Pep   














  08/22/20 08/22/20 08/22/20





  12:38 12:38 16:55


 


Creatine Kinase   


 


CK-MB (CK-2)   


 


Troponin I  0.122   0.111


 


NT-Pro-B Natriuret Pep   4610 H 














  08/29/20 08/29/20





  02:31 02:31


 


Creatine Kinase  106 


 


CK-MB (CK-2)   2.04


 


Troponin I   0.013


 


NT-Pro-B Natriuret Pep  











Impressions: 


                                        





Cervical Spine CT  08/22/20 12:18


IMPRESSION:  No acute fracture or dislocation of the cervical spine.  Multilevel

spondylosis, degenerative disc disease, and facet arthropathy.


 








Head CT  08/22/20 12:18


IMPRESSION:  No acute intracranial hemorrhage, mass, or evidence of acute 

territorial infarct.


EVIDENCE OF ACUTE STROKE: NO.


 








Chest CT  08/22/20 12:21


IMPRESSION:


1. Although there is motion in the pelvis, there appears to be an acute impacted

subcapital fracture of the right proximal femur.  Further evaluation with 

dedicated two view radiograph is recommended.


2. Age-indeterminate moderate compression fracture at T12, however this is 

stable since 8/6/2020 radiograph.  This may be subacute or chronic.  No 

retropulsed fracture fragments.


3. Acute appearing fractures of the anterolateral left 8-10th ribs.


4. Patchy areas of consolidation with areas of nodular consolidation in both 

lungs, suggestive of infectious/ inflammatory process.  Given nodular 

components, a follow-up CT of the chest in 3 months is recommended to confirm 

complete resolution.  Commonly reported imaging features of COVID-19  pneumonia 

are present. Other processes such as influenza pneumonia and organizing 

pneumonia, as can be seen with drug toxicity and connective tissue disease, can 

cause a similar imaging pattern.


5. No evidence of acute traumatic solid organ or vascular injury in the chest, 

abdomen or pelvis.


 








Abdomen/Pelvis CT  08/22/20 12:23


IMPRESSION:


1. Although there is motion in the pelvis, there appears to be an acute impacted

subcapital fracture of the right proximal femur.  Further evaluation with 

dedicated two view radiograph is recommended.


2. Age-indeterminate moderate compression fracture at T12, however this is 

stable since 8/6/2020 radiograph.  This may be subacute or chronic.  No ret

ropulsed fracture fragments.


3. Acute appearing fractures of the anterolateral left 8-10th ribs.


4. Patchy areas of consolidation with areas of nodular consolidation in both 

lungs, suggestive of infectious/ inflammatory process.  Given nodular 

components, a follow-up CT of the chest in 3 months is recommended to confirm 

complete resolution.  Commonly reported imaging features of COVID-19  pneumonia 

are present. Other processes such as influenza pneumonia and organizing 

pneumonia, as can be seen with drug toxicity and connective tissue disease, can 

cause a similar imaging pattern.


5. No evidence of acute traumatic solid organ or vascular injury in the chest, 

abdomen or pelvis.


 








Skeletal Survey  08/25/20 07:00


IMPRESSION:  1.  Acute/subacute impacted fracture of the right femoral neck.


2.  Findings as described.


 








Chest X-Ray  09/03/20 00:00


IMPRESSION:  Improving pneumonia.


 








Hip/Pelvis X-Ray  09/09/20 00:00


IMPRESSION:  Right hip hemiarthroplasty.  Refer to operative note for further 

information.


 














Assessment & Plan





- Diagnosis


(1) ARF (acute renal failure)


Qualifiers: 


   Qualified Code(s): N17.9 - Acute kidney failure, unspecified   


Is this a current diagnosis for this admission?: Yes   











(4) Hypocalcemia


Is this a current diagnosis for this admission?: Yes   








(6) Hypokalemia


Is this a current diagnosis for this admission?: Yes   





(7) Subcapital fracture of right femur


Qualifiers: 


   Qualified Code(s): S72.011A - Unspecified intracapsular fracture of right 

femur, initial encounter for closed fracture   


Is this a current diagnosis for this admission?: Yes   





- Time


Critical Time spent with patient: Less than 15 minutes


Anticipated Discharge Disposition: Skilled Nursing Facility


Anticipated Discharge Timeframe: when bed available





- Plan Summary


Plan Summary: 





The patient is postoperative day #1 status post right hip hemiarthroplasty.  

Postoperative radiographs demonstrate the prosthetic hip to be in good position.

 The patient will complete 24 hours of perioperative antibiotics.  Physical 

therapy has been ordered: The patient is weightbearing as tolerated with 

posterior hip precautions.


The surgical wound has been closed with sterile surgical glue.  The patient may 

shower when clinically appropriate per his other medical problems.


The patient is stable from an orthopedic standpoint for discharge.  The patient 

should follow-up in the office in 2 weeks following discharge for wound 

evaluation.

## 2020-09-10 NOTE — PDOC PROGRESS REPORT
Subjective


Progress Note for:: 09/10/20


Subjective:: 





Patient seen by the bedside, patient Nurse said he is not eating that much today


Reason For Visit: 


RECURRENT FALLS,RIGHT FEMORAL FRACTURE,PNEUMONIA,








Physical Exam


Vital Signs: 


                                        











Temp Pulse Resp BP Pulse Ox


 


 98.2 F   78   17   100/51 L  94 


 


 09/10/20 15:47  09/10/20 15:47  09/10/20 15:47  09/10/20 15:47  09/10/20 15:47








                                 Intake & Output











 09/09/20 09/10/20 09/11/20





 06:59 06:59 06:59


 


Intake Total 1390 3728 670


 


Output Total 800 1000 300


 


Balance 590 2728 370


 


Weight 84.1 kg 85.8 kg 85.8 kg














Results


Laboratory Results: 


                                        





                                 09/10/20 04:51 





                                 09/10/20 04:51 





                                        











  09/10/20 09/10/20





  04:51 04:51


 


WBC   11.0 H


 


RBC   2.65 L


 


Hgb   8.1 L


 


Hct   24.6 L


 


MCV   93


 


MCH   30.7


 


MCHC   33.1


 


RDW   14.5 H


 


Plt Count   115 L


 


Sodium  144.9 


 


Potassium  3.2 L 


 


Chloride  117 H 


 


Carbon Dioxide  22 


 


Anion Gap  6 


 


BUN  21 H 


 


Creatinine  1.10 


 


Est GFR (African Amer)  > 60 


 


Glucose  112 H 


 


Calcium  6.3 L* 


 


Total Bilirubin  0.5 


 


AST  17 


 


Alkaline Phosphatase  52 


 


Total Protein  4.0 L 


 


Albumin  1.9 L 








                                        











  08/22/20 08/22/20 08/22/20





  11:40 11:40 12:38


 


Creatine Kinase  Cancelled   1513 H


 


CK-MB (CK-2)   


 


Troponin I   Cancelled 


 


NT-Pro-B Natriuret Pep   














  08/22/20 08/22/20 08/22/20





  12:38 12:38 16:55


 


Creatine Kinase   


 


CK-MB (CK-2)   


 


Troponin I  0.122   0.111


 


NT-Pro-B Natriuret Pep   4610 H 














  08/29/20 08/29/20





  02:31 02:31


 


Creatine Kinase  106 


 


CK-MB (CK-2)   2.04


 


Troponin I   0.013


 


NT-Pro-B Natriuret Pep  











Impressions: 


                                        





Cervical Spine CT  08/22/20 12:18


IMPRESSION:  No acute fracture or dislocation of the cervical spine.  Multilevel

spondylosis, degenerative disc disease, and facet arthropathy.


 








Head CT  08/22/20 12:18


IMPRESSION:  No acute intracranial hemorrhage, mass, or evidence of acute 

territorial infarct.


EVIDENCE OF ACUTE STROKE: NO.


 








Chest CT  08/22/20 12:21


IMPRESSION:


1. Although there is motion in the pelvis, there appears to be an acute impacted

subcapital fracture of the right proximal femur.  Further evaluation with 

dedicated two view radiograph is recommended.


2. Age-indeterminate moderate compression fracture at T12, however this is 

stable since 8/6/2020 radiograph.  This may be subacute or chronic.  No ret

ropulsed fracture fragments.


3. Acute appearing fractures of the anterolateral left 8-10th ribs.


4. Patchy areas of consolidation with areas of nodular consolidation in both 

lungs, suggestive of infectious/ inflammatory process.  Given nodular 

components, a follow-up CT of the chest in 3 months is recommended to confirm 

complete resolution.  Commonly reported imaging features of COVID-19  pneumonia 

are present. Other processes such as influenza pneumonia and organizing 

pneumonia, as can be seen with drug toxicity and connective tissue disease, can 

cause a similar imaging pattern.


5. No evidence of acute traumatic solid organ or vascular injury in the chest, 

abdomen or pelvis.


 








Abdomen/Pelvis CT  08/22/20 12:23


IMPRESSION:


1. Although there is motion in the pelvis, there appears to be an acute impacted

subcapital fracture of the right proximal femur.  Further evaluation with 

dedicated two view radiograph is recommended.


2. Age-indeterminate moderate compression fracture at T12, however this is 

stable since 8/6/2020 radiograph.  This may be subacute or chronic.  No 

retropulsed fracture fragments.


3. Acute appearing fractures of the anterolateral left 8-10th ribs.


4. Patchy areas of consolidation with areas of nodular consolidation in both 

lungs, suggestive of infectious/ inflammatory process.  Given nodular 

components, a follow-up CT of the chest in 3 months is recommended to confirm 

complete resolution.  Commonly reported imaging features of COVID-19  pneumonia 

are present. Other processes such as influenza pneumonia and organizing 

pneumonia, as can be seen with drug toxicity and connective tissue disease, can 

cause a similar imaging pattern.


5. No evidence of acute traumatic solid organ or vascular injury in the chest, 

abdomen or pelvis.


 








Skeletal Survey  08/25/20 07:00


IMPRESSION:  1.  Acute/subacute impacted fracture of the right femoral neck.


2.  Findings as described.


 








Chest X-Ray  09/03/20 00:00


IMPRESSION:  Improving pneumonia.


 








Hip/Pelvis X-Ray  09/09/20 00:00


IMPRESSION:  Right hip hemiarthroplasty.  Refer to operative note for further 

information.


 














Assessment & Plan





- Diagnosis


(1) Sepsis


Qualifiers: 


   Sepsis type: methicillin susceptible Staphylococcus aureus   Sepsis acute 

organ dysfunction status: with acute organ dysfunction   Severe sepsis acute 

organ dysfunction type: acute renal failure   Acute renal failure type: with 

acute tubular necrosis   Severe sepsis shock status: without septic shock   

Qualified Code(s): A41.01 - Sepsis due to Methicillin susceptible Staphylococcus

aureus; R65.20 - Severe sepsis without septic shock; N17.0 - Acute kidney 

failure with tubular necrosis   


Is this a current diagnosis for this admission?: Yes   





(2) Pneumonia due to COVID-19 virus


Is this a current diagnosis for this admission?: Yes   





(3) Staphylococcus aureus septicemia


Is this a current diagnosis for this admission?: Yes   





(4) Acute kidney injury


Is this a current diagnosis for this admission?: Yes   





(5) Multiple fractures of ribs of left side


Qualifiers: 


   Encounter type: initial encounter   Fracture type: closed   Qualified 

Code(s): S22.42XA - Multiple fractures of ribs, left side, initial encounter for

closed fracture   


Is this a current diagnosis for this admission?: Yes   





(6) Compression fracture of T12 vertebra


Qualifiers: 


   Encounter type: initial encounter   Qualified Code(s): S22.080A - Wedge 

compression fracture of T11-T12 vertebra, initial encounter for closed fracture 

 


Is this a current diagnosis for this admission?: Yes   





(7) Pathologic fracture


Qualifiers: 


   Pathology associated with fracture: osteoporosis   Osteoporosis type: age-

related   Site of pathological fracture: vertebra   Encounter type: initial 

encounter   Qualified Code(s): M80.08XA - Age-related osteoporosis with current 

pathological fracture, vertebra(e), initial encounter for fracture   


Is this a current diagnosis for this admission?: Yes   





(8) Hypocalcemia


Is this a current diagnosis for this admission?: Yes   





(9) Metabolic acidosis


Is this a current diagnosis for this admission?: Yes   





(10) Subcapital fracture of right femur


Qualifiers: 


   Encounter type: initial encounter   Fracture type: closed   Qualified 

Code(s): S72.011A - Unspecified intracapsular fracture of right femur, initial 

encounter for closed fracture   


Is this a current diagnosis for this admission?: Yes   





(11) Secondary hyperparathyroidism


Is this a current diagnosis for this admission?: Yes   





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


Is this a current diagnosis for this admission?: Yes   





(13) Clostridioides difficile diarrhea


Is this a current diagnosis for this admission?: Yes   


Plan: 


Continue vancomycin p.o.








(14) Hypokalemia


Is this a current diagnosis for this admission?: Yes   


Plan: 


Patient with persistent hypokalemia due to diarrhea, replace potassium








- Time


Time Spent with patient: 25-34 minutes


Level of Care: IMCU


Medications reviewed and adjusted accordingly: Yes


Anticipated discharge: Home


Anticipated DC Timeframe: when bed available

## 2020-09-10 NOTE — PDOC CONSULTATION
Consultation


Consult Date: 09/10/20


Attending physician:: DENICE PANDEY


Provider Consulted: RAFITA LEVI


Consult reason:: Asked by primary team to see patient for concern of 

paraproteinemia





History of Present Illness


Admission Date/PCP: 


  08/22/20 18:50





  DENICE PANDEY MD





Patient complains of: Anemia


History of Present Illness: 


This is a telemedicine visit because of COVID restrictions with multiple medical

issues,





MARÍA SWAN is a 89 year old male presents with sepsis, renal failure, also 

found to have right hip fracture, status post recent hip surgery.  Hemoglobin 

was 9-10 on admission, and patient did have a work-up for myeloma.  I reviewed 

the SPEP, immunoglobulins, immunofixation.  This indicates a polyclonal 

gammopathy.  There is no M spike.  However urine protein electrophoresis does 

indicate presence of a urine monoclonal spike.  However immunofixation on that 

is not yet available.  His hemoglobin did drop from the 10 to the 8 range post 

surgery.








Past Medical History


Cardiac Medical History: Reports: Atrial Fibrillation, Hypertension


   Denies: Coronary Artery Disease, Myocardial Infarction


Pulmonary Medical History: 


   Denies: Asthma, Bronchitis, Chronic Obstructive Pulmonary Disease (COPD), 

Pneumonia


Neurological Medical History: 


   Denies: Seizures


Endocrine Medical History: Reports: Diabetes Mellitus Type 2


Musculoskeltal Medical History: 


   Denies: Arthritis


Psychiatric Medical History: Reports: Depression


Hematology: 


   Denies: Anemia





Past Surgical History


Past Surgical History: Reports: Orthopedic Surgery - back, R ankle, R hip





Social History


Information Source: Patient


Lives with: Family


Smoking Status: Never Smoker


Electronic Cigarette use?: No


Frequency of Alcohol Use: None


Hx Recreational Drug Use: No


Drugs: None


Hx Prescription Drug Abuse: No





- Advance Directive


Resuscitation Status: Do Not Resuscitate





Family History


Family History: Reviewed & Not Pertinent


Parental Family History Reviewed: Yes


Children Family History Reviewed: Yes


Sibling(s) Family History Reviewed.: Yes





Medication/Allergy


Home Medications: 








Bupropion HCl [Bupropion HCl Sr] 150 mg PO BID 01/11/19 


Tamsulosin HCl [Flomax 0.4 mg Cap.sr] 0.4 mg PO PCSUPPER 01/11/19 


Amlodipine/Valsartan/Hcthiazid [Exforge Hct -25 mg Tab] 1 tab PO QAM 

08/06/20 


Cholecalciferol (Vitamin D3) [Vitamin D3 400 Unit Tablet] 400 unit PO QAM 

08/06/20 


Metoprolol Succinate [Toprol Xl] 100 mg PO QA 08/06/20 


Potassium Chloride 20 meq PO DAILY 08/06/20 


Simvastatin [Zocor 10 mg Tablet] 10 mg PO QHS 08/06/20 


Aspirin [Adult Low Dose Aspirin EC] 81 mg PO QAM 08/23/20 


Multivitamin [Multiple Vitamins] 1 tab PO QA 08/23/20 


Venlafaxine HCl ER [Effexor Xr 75 mg Cap.sr] 75 mg PO QA 08/23/20 








Allergies/Adverse Reactions: 


                                        





No Known Allergies Allergy (Verified 08/06/20 12:45)


   











Review of Systems


ROS unobtainable: Due to mental status





Physical Exam


Vital Signs: 


                                        











Temp Pulse Resp BP Pulse Ox


 


 97.5 F   90   16   116/66   95 


 


 09/09/20 16:08  09/10/20 07:00  09/09/20 16:08  09/09/20 16:08  09/09/20 16:08








                                 Intake & Output











 09/09/20 09/10/20 09/11/20





 06:59 06:59 06:59


 


Intake Total 1390 3728 0


 


Output Total 800 1000 


 


Balance 590 2728 0


 


Weight 84.1 kg 85.8 kg 














Results


Laboratory Results: 


                                        





                                 09/10/20 04:51 





                                 09/10/20 04:51 





                                        











  09/09/20 09/10/20 09/10/20





  08:49 04:51 04:51


 


WBC    11.0 H


 


RBC    2.65 L


 


Hgb    8.1 L


 


Hct    24.6 L


 


MCV    93


 


MCH    30.7


 


MCHC    33.1


 


RDW    14.5 H


 


Plt Count    115 L


 


Sodium  145.0  144.9 


 


Potassium  2.5 L*  3.2 L 


 


Chloride  117 H  117 H 


 


Carbon Dioxide  23  22 


 


Anion Gap  5  6 


 


BUN  18  21 H 


 


Creatinine  1.04  1.10 


 


Est GFR ( Amer)  > 60  > 60 


 


Glucose  82  112 H 


 


Calcium  6.6 L*  6.3 L* 


 


Total Bilirubin  0.5  0.5 


 


AST  16 L  17 


 


Alkaline Phosphatase  66  52 


 


Total Protein  4.2 L  4.0 L 


 


Albumin  2.0 L  1.9 L 








                                        











  08/22/20 08/22/20 08/22/20





  11:40 11:40 12:38


 


Creatine Kinase  Cancelled   1513 H


 


CK-MB (CK-2)   


 


Troponin I   Cancelled 


 


NT-Pro-B Natriuret Pep   














  08/22/20 08/22/20 08/22/20





  12:38 12:38 16:55


 


Creatine Kinase   


 


CK-MB (CK-2)   


 


Troponin I  0.122   0.111


 


NT-Pro-B Natriuret Pep   4610 H 














  08/29/20 08/29/20





  02:31 02:31


 


Creatine Kinase  106 


 


CK-MB (CK-2)   2.04


 


Troponin I   0.013


 


NT-Pro-B Natriuret Pep  











Impressions: 


                                        





Cervical Spine CT  08/22/20 12:18


IMPRESSION:  No acute fracture or dislocation of the cervical spine.  Multilevel

spondylosis, degenerative disc disease, and facet arthropathy.


 








Head CT  08/22/20 12:18


IMPRESSION:  No acute intracranial hemorrhage, mass, or evidence of acute 

territorial infarct.


EVIDENCE OF ACUTE STROKE: NO.


 








Chest CT  08/22/20 12:21


IMPRESSION:


1. Although there is motion in the pelvis, there appears to be an acute impacted

subcapital fracture of the right proximal femur.  Further evaluation with 

dedicated two view radiograph is recommended.


2. Age-indeterminate moderate compression fracture at T12, however this is 

stable since 8/6/2020 radiograph.  This may be subacute or chronic.  No ret

ropulsed fracture fragments.


3. Acute appearing fractures of the anterolateral left 8-10th ribs.


4. Patchy areas of consolidation with areas of nodular consolidation in both 

lungs, suggestive of infectious/ inflammatory process.  Given nodular 

components, a follow-up CT of the chest in 3 months is recommended to confirm 

complete resolution.  Commonly reported imaging features of COVID-19  pneumonia 

are present. Other processes such as influenza pneumonia and organizing 

pneumonia, as can be seen with drug toxicity and connective tissue disease, can 

cause a similar imaging pattern.


5. No evidence of acute traumatic solid organ or vascular injury in the chest, 

abdomen or pelvis.


 








Abdomen/Pelvis CT  08/22/20 12:23


IMPRESSION:


1. Although there is motion in the pelvis, there appears to be an acute impacted

subcapital fracture of the right proximal femur.  Further evaluation with 

dedicated two view radiograph is recommended.


2. Age-indeterminate moderate compression fracture at T12, however this is 

stable since 8/6/2020 radiograph.  This may be subacute or chronic.  No 

retropulsed fracture fragments.


3. Acute appearing fractures of the anterolateral left 8-10th ribs.


4. Patchy areas of consolidation with areas of nodular consolidation in both 

lungs, suggestive of infectious/ inflammatory process.  Given nodular 

components, a follow-up CT of the chest in 3 months is recommended to confirm 

complete resolution.  Commonly reported imaging features of COVID-19  pneumonia 

are present. Other processes such as influenza pneumonia and organizing 

pneumonia, as can be seen with drug toxicity and connective tissue disease, can 

cause a similar imaging pattern.


5. No evidence of acute traumatic solid organ or vascular injury in the chest, 

abdomen or pelvis.


 








Skeletal Survey  08/25/20 07:00


IMPRESSION:  1.  Acute/subacute impacted fracture of the right femoral neck.


2.  Findings as described.


 








Chest X-Ray  09/03/20 00:00


IMPRESSION:  Improving pneumonia.


 








Hip/Pelvis X-Ray  09/09/20 00:00


IMPRESSION:  Right hip hemiarthroplasty.  Refer to operative note for further 

information.


 














Assessment & Plan





- Diagnosis


(1) Monoclonal paraproteinemia


Is this a current diagnosis for this admission?: Yes   


Plan: 


He may have a myeloma ongoing, needs further work-up including skeletal survey 

and serum free light chain but at present he needs to recover from his multiple 

other issues, therefore I would do this as an outpatient.  He would not be a 

candidate for therapy right now given the other issues.  Recommend that patient 

get better through this hospitalization, and once he can be over COVID and other

medical issues, we could see him in the office and consider further work-up.  

But given his age and other multiple medical issues, he may not get to that 

point.  We will follow peripherally while in-house and make arrangements for 

follow-up thereafter.








- Time


Time Spent: Greater than 70 Minutes





- Inpatient Certification


Based on my medical assessment, after consideration of the patient's comorbi

dities, presenting symptoms, or acuity I expect that the services needed warrant

INPATIENT care.: Yes


I certify that my determination is in accordance with my understanding of 

Medicare's requirements for reasonable and necessary INPATIENT services [42 CFR 

412.3e].: Yes


Medical Necessity: Risk of Complication if Not Cared For in Hospital

## 2020-09-11 NOTE — PDOC PROGRESS REPORT
Subjective


Progress Note for:: 09/11/20


Subjective:: 





Patient seen by the bedside the hemoglobin is low for blood work, no evidence of

active bleed, will continue to monitor for few  days


Reason For Visit: 


RECURRENT FALLS,RIGHT FEMORAL FRACTURE,PNEUMONIA,








Physical Exam


Vital Signs: 


                                        











Temp Pulse Resp BP Pulse Ox


 


 97.4 F   84   18   99/58 L  93 


 


 09/11/20 15:31  09/11/20 15:31  09/11/20 15:31  09/11/20 15:31  09/11/20 15:31








                                 Intake & Output











 09/10/20 09/11/20 09/12/20





 06:59 06:59 06:59


 


Intake Total 3728 2774 500


 


Output Total 1000 550 850


 


Balance 2728 2224 -350


 


Weight 85.8 kg 86.7 kg 











General appearance: PRESENT: no acute distress


Eye exam: PRESENT: PERRLA


Respiratory exam: PRESENT: clear to auscultation beatrice


Cardiovascular exam: PRESENT: +S1, +S2


Neurological exam: PRESENT: alert





Results


Laboratory Results: 


                                        





                                 09/11/20 05:16 





                                 09/11/20 09:47 





                                        











  09/11/20 09/11/20





  05:16 09:47


 


WBC  11.6 H 


 


RBC  2.57 L 


 


Hgb  7.9 L 


 


Hct  24.0 L 


 


MCV  93 


 


MCH  30.6 


 


MCHC  32.8 


 


RDW  14.9 H 


 


Plt Count  111 L 


 


Sodium   145.7 H


 


Potassium   3.1 L


 


Chloride   117 H


 


Carbon Dioxide   23


 


Anion Gap   6


 


BUN   21 H


 


Creatinine   1.21


 


Est GFR (African Amer)   > 60


 


Glucose   113 H


 


Calcium   6.0 L*


 


Total Bilirubin   0.4


 


AST   18


 


Alkaline Phosphatase   55


 


Total Protein   3.7 L


 


Albumin   1.7 L








                                        











  08/22/20 08/22/20 08/22/20





  11:40 11:40 12:38


 


Creatine Kinase  Cancelled   1513 H


 


CK-MB (CK-2)   


 


Troponin I   Cancelled 


 


NT-Pro-B Natriuret Pep   














  08/22/20 08/22/20 08/22/20





  12:38 12:38 16:55


 


Creatine Kinase   


 


CK-MB (CK-2)   


 


Troponin I  0.122   0.111


 


NT-Pro-B Natriuret Pep   4610 H 














  08/29/20 08/29/20





  02:31 02:31


 


Creatine Kinase  106 


 


CK-MB (CK-2)   2.04


 


Troponin I   0.013


 


NT-Pro-B Natriuret Pep  











Impressions: 


                                        





Cervical Spine CT  08/22/20 12:18


IMPRESSION:  No acute fracture or dislocation of the cervical spine.  Multilevel

spondylosis, degenerative disc disease, and facet arthropathy.


 








Head CT  08/22/20 12:18


IMPRESSION:  No acute intracranial hemorrhage, mass, or evidence of acute 

territorial infarct.


EVIDENCE OF ACUTE STROKE: NO.


 








Chest CT  08/22/20 12:21


IMPRESSION:


1. Although there is motion in the pelvis, there appears to be an acute impacted

subcapital fracture of the right proximal femur.  Further evaluation with 

dedicated two view radiograph is recommended.


2. Age-indeterminate moderate compression fracture at T12, however this is st

able since 8/6/2020 radiograph.  This may be subacute or chronic.  No 

retropulsed fracture fragments.


3. Acute appearing fractures of the anterolateral left 8-10th ribs.


4. Patchy areas of consolidation with areas of nodular consolidation in both 

lungs, suggestive of infectious/ inflammatory process.  Given nodular 

components, a follow-up CT of the chest in 3 months is recommended to confirm 

complete resolution.  Commonly reported imaging features of COVID-19  pneumonia 

are present. Other processes such as influenza pneumonia and organizing 

pneumonia, as can be seen with drug toxicity and connective tissue disease, can 

cause a similar imaging pattern.


5. No evidence of acute traumatic solid organ or vascular injury in the chest, 

abdomen or pelvis.


 








Abdomen/Pelvis CT  08/22/20 12:23


IMPRESSION:


1. Although there is motion in the pelvis, there appears to be an acute impacted

subcapital fracture of the right proximal femur.  Further evaluation with 

dedicated two view radiograph is recommended.


2. Age-indeterminate moderate compression fracture at T12, however this is 

stable since 8/6/2020 radiograph.  This may be subacute or chronic.  No 

retropulsed fracture fragments.


3. Acute appearing fractures of the anterolateral left 8-10th ribs.


4. Patchy areas of consolidation with areas of nodular consolidation in both 

lungs, suggestive of infectious/ inflammatory process.  Given nodular 

components, a follow-up CT of the chest in 3 months is recommended to confirm 

complete resolution.  Commonly reported imaging features of COVID-19  pneumonia 

are present. Other processes such as influenza pneumonia and organizing 

pneumonia, as can be seen with drug toxicity and connective tissue disease, can 

cause a similar imaging pattern.


5. No evidence of acute traumatic solid organ or vascular injury in the chest, 

abdomen or pelvis.


 








Skeletal Survey  08/25/20 07:00


IMPRESSION:  1.  Acute/subacute impacted fracture of the right femoral neck.


2.  Findings as described.


 








Chest X-Ray  09/03/20 00:00


IMPRESSION:  Improving pneumonia.


 








Hip/Pelvis X-Ray  09/09/20 00:00


IMPRESSION:  Right hip hemiarthroplasty.  Refer to operative note for further 

information.


 














Assessment & Plan





- Diagnosis


(1) Sepsis


Qualifiers: 


   Sepsis type: methicillin susceptible Staphylococcus aureus   Sepsis acute 

organ dysfunction status: with acute organ dysfunction   Severe sepsis acute 

organ dysfunction type: acute renal failure   Acute renal failure type: with 

acute tubular necrosis   Severe sepsis shock status: without septic shock   

Qualified Code(s): A41.01 - Sepsis due to Methicillin susceptible Staphylococcus

aureus; R65.20 - Severe sepsis without septic shock; N17.0 - Acute kidney 

failure with tubular necrosis   


Is this a current diagnosis for this admission?: Yes   





(2) Pneumonia due to COVID-19 virus


Is this a current diagnosis for this admission?: Yes   





(3) Staphylococcus aureus septicemia


Is this a current diagnosis for this admission?: Yes   





(4) Acute kidney injury


Is this a current diagnosis for this admission?: Yes   





(5) Multiple fractures of ribs of left side


Qualifiers: 


   Encounter type: initial encounter   Fracture type: closed   Qualified 

Code(s): S22.42XA - Multiple fractures of ribs, left side, initial encounter for

closed fracture   


Is this a current diagnosis for this admission?: Yes   





(6) Compression fracture of T12 vertebra


Qualifiers: 


   Encounter type: initial encounter   Qualified Code(s): S22.080A - Wedge 

compression fracture of T11-T12 vertebra, initial encounter for closed fracture 

 


Is this a current diagnosis for this admission?: Yes   





(7) Pathologic fracture


Qualifiers: 


   Pathology associated with fracture: osteoporosis   Osteoporosis type: age-

related   Site of pathological fracture: vertebra   Encounter type: initial 

encounter   Qualified Code(s): M80.08XA - Age-related osteoporosis with current 

pathological fracture, vertebra(e), initial encounter for fracture   


Is this a current diagnosis for this admission?: Yes   





(8) Hypocalcemia


Is this a current diagnosis for this admission?: Yes   





(9) Metabolic acidosis


Is this a current diagnosis for this admission?: Yes   





(10) Subcapital fracture of right femur


Qualifiers: 


   Encounter type: initial encounter   Fracture type: closed   Qualified 

Code(s): S72.011A - Unspecified intracapsular fracture of right femur, initial 

encounter for closed fracture   


Is this a current diagnosis for this admission?: Yes   





(11) Secondary hyperparathyroidism


Is this a current diagnosis for this admission?: Yes   





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


Is this a current diagnosis for this admission?: Yes   





(13) Clostridioides difficile diarrhea


Is this a current diagnosis for this admission?: Yes   





(14) Hypokalemia


Is this a current diagnosis for this admission?: Yes   





- Time


Time Spent with patient: 25-34 minutes


Level of Care: IMCU


Medications reviewed and adjusted accordingly: Yes


Anticipated discharge: SNF


Anticipated DC Timeframe: when bed available





- Inpatient Certification


Based on my medical assessment, after consideration of the patient's 

comorbidities, presenting symptoms, or acuity I expect that the services needed 

warrant INPATIENT care.: Yes


I certify that my determination is in accordance with my understanding of 

Medicare's requirements for reasonable and necessary INPATIENT services [42 CFR 

412.3e].: Yes





- Plan Summary


Plan Summary: 





Patient seen by the bedside, plan is for rehab

## 2020-09-11 NOTE — PDOC PROGRESS REPORT
Subjective


Progress Note for:: 09/11/20


Subjective:: 





Patient has no complaints of pain in the right hip following arthroplasty.


Reason For Visit: 


RECURRENT FALLS,RIGHT FEMORAL FRACTURE,PNEUMONIA,


Operative day #2 status post right hip hemiarthroplasty





Physical Exam


Vital Signs: 


                                        











Temp Pulse Resp BP Pulse Ox


 


 98.1 F   86   18   150/75 H  92 


 


 09/11/20 12:00  09/11/20 14:00  09/11/20 12:00  09/11/20 12:00  09/11/20 12:00








                                 Intake & Output











 09/10/20 09/11/20 09/12/20





 06:59 06:59 06:59


 


Intake Total 3728 2774 280


 


Output Total 1000 550 850


 


Balance 2728 2224 -570


 


Weight 85.8 kg 86.7 kg 











Musculoskeletal exam: PRESENT: other - The operative dressing is intact.  The 

patient has no discomfort with rotation of the hip.  The patient is able to 

dorsiflex and plantarflex the ankle.  Sensation is intact to touch.





Results


Laboratory Results: 


                                        





                                 09/11/20 05:16 





                                 09/11/20 09:47 





                                        











  09/11/20 09/11/20





  05:16 09:47


 


WBC  11.6 H 


 


RBC  2.57 L 


 


Hgb  7.9 L 


 


Hct  24.0 L 


 


MCV  93 


 


MCH  30.6 


 


MCHC  32.8 


 


RDW  14.9 H 


 


Plt Count  111 L 


 


Sodium   145.7 H


 


Potassium   3.1 L


 


Chloride   117 H


 


Carbon Dioxide   23


 


Anion Gap   6


 


BUN   21 H


 


Creatinine   1.21


 


Est GFR (African Amer)   > 60


 


Glucose   113 H


 


Calcium   6.0 L*


 


Total Bilirubin   0.4


 


AST   18


 


Alkaline Phosphatase   55


 


Total Protein   3.7 L


 


Albumin   1.7 L








                                        











  08/22/20 08/22/20 08/22/20





  11:40 11:40 12:38


 


Creatine Kinase  Cancelled   1513 H


 


CK-MB (CK-2)   


 


Troponin I   Cancelled 


 


NT-Pro-B Natriuret Pep   














  08/22/20 08/22/20 08/22/20





  12:38 12:38 16:55


 


Creatine Kinase   


 


CK-MB (CK-2)   


 


Troponin I  0.122   0.111


 


NT-Pro-B Natriuret Pep   4610 H 














  08/29/20 08/29/20





  02:31 02:31


 


Creatine Kinase  106 


 


CK-MB (CK-2)   2.04


 


Troponin I   0.013


 


NT-Pro-B Natriuret Pep  











Impressions: 


                                        





Cervical Spine CT  08/22/20 12:18


IMPRESSION:  No acute fracture or dislocation of the cervical spine.  Multilevel

spondylosis, degenerative disc disease, and facet arthropathy.


 








Head CT  08/22/20 12:18


IMPRESSION:  No acute intracranial hemorrhage, mass, or evidence of acute 

territorial infarct.


EVIDENCE OF ACUTE STROKE: NO.


 








Chest CT  08/22/20 12:21


IMPRESSION:


1. Although there is motion in the pelvis, there appears to be an acute impacted

subcapital fracture of the right proximal femur.  Further evaluation with ded

icated two view radiograph is recommended.


2. Age-indeterminate moderate compression fracture at T12, however this is 

stable since 8/6/2020 radiograph.  This may be subacute or chronic.  No 

retropulsed fracture fragments.


3. Acute appearing fractures of the anterolateral left 8-10th ribs.


4. Patchy areas of consolidation with areas of nodular consolidation in both 

lungs, suggestive of infectious/ inflammatory process.  Given nodular 

components, a follow-up CT of the chest in 3 months is recommended to confirm 

complete resolution.  Commonly reported imaging features of COVID-19  pneumonia 

are present. Other processes such as influenza pneumonia and organizing 

pneumonia, as can be seen with drug toxicity and connective tissue disease, can 

cause a similar imaging pattern.


5. No evidence of acute traumatic solid organ or vascular injury in the chest, 

abdomen or pelvis.


 








Abdomen/Pelvis CT  08/22/20 12:23


IMPRESSION:


1. Although there is motion in the pelvis, there appears to be an acute impacted

subcapital fracture of the right proximal femur.  Further evaluation with 

dedicated two view radiograph is recommended.


2. Age-indeterminate moderate compression fracture at T12, however this is 

stable since 8/6/2020 radiograph.  This may be subacute or chronic.  No 

retropulsed fracture fragments.


3. Acute appearing fractures of the anterolateral left 8-10th ribs.


4. Patchy areas of consolidation with areas of nodular consolidation in both 

lungs, suggestive of infectious/ inflammatory process.  Given nodular 

components, a follow-up CT of the chest in 3 months is recommended to confirm 

complete resolution.  Commonly reported imaging features of COVID-19  pneumonia 

are present. Other processes such as influenza pneumonia and organizing 

pneumonia, as can be seen with drug toxicity and connective tissue disease, can 

cause a similar imaging pattern.


5. No evidence of acute traumatic solid organ or vascular injury in the chest, 

abdomen or pelvis.


 








Skeletal Survey  08/25/20 07:00


IMPRESSION:  1.  Acute/subacute impacted fracture of the right femoral neck.


2.  Findings as described.


 








Chest X-Ray  09/03/20 00:00


IMPRESSION:  Improving pneumonia.


 








Hip/Pelvis X-Ray  09/09/20 00:00


IMPRESSION:  Right hip hemiarthroplasty.  Refer to operative note for further 

information.


 














Assessment & Plan





- Diagnosis


(1) Subcapital fracture of right femur


Qualifiers: 


   Encounter type: initial encounter   Fracture type: closed   Qualified 

Code(s): S72.011A - Unspecified intracapsular fracture of right femur, initial 

encounter for closed fracture   


Is this a current diagnosis for this admission?: Yes   





(2) ARF (acute renal failure)


Qualifiers: 


   Acute renal failure type: unspecified   Qualified Code(s): N17.9 - Acute 

kidney failure, unspecified   


Is this a current diagnosis for this admission?: Yes   











(5) Hypocalcemia


Is this a current diagnosis for this admission?: Yes   








(7) Hypokalemia


Is this a current diagnosis for this admission?: Yes   





- Time


Critical Time spent with patient: Less than 15 minutes


Anticipated Discharge Disposition: Skilled Nursing Facility


Anticipated Discharge Timeframe: when bed available





- Plan Summary


Plan Summary: 





The patient is postoperative day #2 status post right hip hemiarthroplasty.  

Postoperative radiographs demonstrate the prosthetic hip to be in good position.

 The patient has completed 24 hours of perioperative antibiotics.  Physical 

therapy has been ordered: The patient is weightbearing as tolerated with 

posterior hip precautions.


The surgical wound has been closed with sterile surgical glue.  The patient may 

shower when clinically appropriate per his other medical problems.


The patient is stable from an orthopedic standpoint for discharge.  The patient 

should follow-up in the office in 2 weeks following discharge for wound 

evaluation.


Please reconsult as needed.

## 2020-09-11 NOTE — PDOC PROGRESS REPORT
Subjective


Progress Note for:: 09/11/20


Subjective:: 


No acute events that I can tell overnight, from notes, I have placed a call to 

pathology and assess them of the situation with positive urine electrophoresis, 

they will need to pay attention to plasma cell count and monoclonality of the 

femoral neck specimen.





Reason For Visit: 


RECURRENT FALLS,RIGHT FEMORAL FRACTURE,PNEUMONIA,








Physical Exam


Vital Signs: 


                                        











Temp Pulse Resp BP Pulse Ox


 


 98.2 F   77   17   100/51 L  94 


 


 09/10/20 15:47  09/11/20 02:00  09/10/20 15:47  09/10/20 15:47  09/10/20 15:47








                                 Intake & Output











 09/10/20 09/11/20 09/12/20





 06:59 06:59 06:59


 


Intake Total 3728 2774 


 


Output Total 1000 550 


 


Balance 2728 2224 


 


Weight 85.8 kg 86.7 kg 











General appearance: PRESENT: no acute distress, well-developed, well-nourished


Head exam: PRESENT: atraumatic, normocephalic


Eye exam: PRESENT: conjunctiva pink, EOMI, PERRLA.  ABSENT: scleral icterus


Ear exam: PRESENT: normal external ear exam


Mouth exam: PRESENT: moist, tongue midline


Neck exam: ABSENT: carotid bruit, JVD, lymphadenopathy, thyromegaly


Respiratory exam: PRESENT: clear to auscultation beatrice.  ABSENT: rales, rhonchi, 

wheezes


Cardiovascular exam: PRESENT: RRR.  ABSENT: diastolic murmur, rubs, systolic 

murmur


Pulses: PRESENT: normal dorsalis pedis pul


Vascular exam: PRESENT: normal capillary refill


GI/Abdominal exam: PRESENT: normal bowel sounds, soft.  ABSENT: distended, 

guarding, mass, organolmegaly, rebound, tenderness


Rectal exam: PRESENT: deferred


Extremities exam: PRESENT: full ROM.  ABSENT: calf tenderness, clubbing, pedal 

edema


Neurological exam: PRESENT: alert, awake, oriented to person, oriented to place,

oriented to time, oriented to situation, CN II-XII grossly intact.  ABSENT: 

motor sensory deficit


Psychiatric exam: PRESENT: appropriate affect, normal mood.  ABSENT: homicidal 

ideation, suicidal ideation


Skin exam: PRESENT: dry, intact, warm.  ABSENT: cyanosis, rash





Results


Laboratory Results: 


                                        





                                 09/11/20 05:16 





                                 09/10/20 04:51 





                                        











  09/11/20





  05:16


 


WBC  11.6 H


 


RBC  2.57 L


 


Hgb  7.9 L


 


Hct  24.0 L


 


MCV  93


 


MCH  30.6


 


MCHC  32.8


 


RDW  14.9 H


 


Plt Count  111 L








                                        











  08/22/20 08/22/20 08/22/20





  11:40 11:40 12:38


 


Creatine Kinase  Cancelled   1513 H


 


CK-MB (CK-2)   


 


Troponin I   Cancelled 


 


NT-Pro-B Natriuret Pep   














  08/22/20 08/22/20 08/22/20





  12:38 12:38 16:55


 


Creatine Kinase   


 


CK-MB (CK-2)   


 


Troponin I  0.122   0.111


 


NT-Pro-B Natriuret Pep   4610 H 














  08/29/20 08/29/20





  02:31 02:31


 


Creatine Kinase  106 


 


CK-MB (CK-2)   2.04


 


Troponin I   0.013


 


NT-Pro-B Natriuret Pep  











Impressions: 


                                        





Cervical Spine CT  08/22/20 12:18


IMPRESSION:  No acute fracture or dislocation of the cervical spine.  Multilevel

spondylosis, degenerative disc disease, and facet arthropathy.


 








Head CT  08/22/20 12:18


IMPRESSION:  No acute intracranial hemorrhage, mass, or evidence of acute 

territorial infarct.


EVIDENCE OF ACUTE STROKE: NO.


 








Chest CT  08/22/20 12:21


IMPRESSION:


1. Although there is motion in the pelvis, there appears to be an acute impacted

subcapital fracture of the right proximal femur.  Further evaluation with 

dedicated two view radiograph is recommended.


2. Age-indeterminate moderate compression fracture at T12, however this is 

stable since 8/6/2020 radiograph.  This may be subacute or chronic.  No 

retropulsed fracture fragments.


3. Acute appearing fractures of the anterolateral left 8-10th ribs.


4. Patchy areas of consolidation with areas of nodular consolidation in both 

lungs, suggestive of infectious/ inflammatory process.  Given nodular componen

ts, a follow-up CT of the chest in 3 months is recommended to confirm complete 

resolution.  Commonly reported imaging features of COVID-19  pneumonia are 

present. Other processes such as influenza pneumonia and organizing pneumonia, 

as can be seen with drug toxicity and connective tissue disease, can cause a 

similar imaging pattern.


5. No evidence of acute traumatic solid organ or vascular injury in the chest, 

abdomen or pelvis.


 








Abdomen/Pelvis CT  08/22/20 12:23


IMPRESSION:


1. Although there is motion in the pelvis, there appears to be an acute impacted

subcapital fracture of the right proximal femur.  Further evaluation with 

dedicated two view radiograph is recommended.


2. Age-indeterminate moderate compression fracture at T12, however this is 

stable since 8/6/2020 radiograph.  This may be subacute or chronic.  No 

retropulsed fracture fragments.


3. Acute appearing fractures of the anterolateral left 8-10th ribs.


4. Patchy areas of consolidation with areas of nodular consolidation in both 

lungs, suggestive of infectious/ inflammatory process.  Given nodular 

components, a follow-up CT of the chest in 3 months is recommended to confirm 

complete resolution.  Commonly reported imaging features of COVID-19  pneumonia 

are present. Other processes such as influenza pneumonia and organizing 

pneumonia, as can be seen with drug toxicity and connective tissue disease, can 

cause a similar imaging pattern.


5. No evidence of acute traumatic solid organ or vascular injury in the chest, 

abdomen or pelvis.


 








Skeletal Survey  08/25/20 07:00


IMPRESSION:  1.  Acute/subacute impacted fracture of the right femoral neck.


2.  Findings as described.


 








Chest X-Ray  09/03/20 00:00


IMPRESSION:  Improving pneumonia.


 








Hip/Pelvis X-Ray  09/09/20 00:00


IMPRESSION:  Right hip hemiarthroplasty.  Refer to operative note for further 

information.


 














Assessment & Plan





- Diagnosis


(1) Monoclonal paraproteinemia


Is this a current diagnosis for this admission?: Yes   


Plan: 


Further work-up will be probably completed as an outpatient, await pathology 

assessment of the femoral neck specimen.  We will follow peripherally.








- Time


Time Spent with patient: 35 or more minutes

## 2020-09-12 NOTE — DEATH SUMMARY
Death Summary


Date : 20


Time of Death:: 15:50


Autopsy: No


Resuscitation Status: Do Not Resuscitate





- Final Diagnosis


(1) Pneumonia due to COVID-19 virus


Is this a current diagnosis for this admission?: Yes   





(2) Sepsis


Is this a current diagnosis for this admission?: Yes   





(3) Staphylococcus aureus septicemia


Is this a current diagnosis for this admission?: Yes   





(4) Acute kidney injury


Is this a current diagnosis for this admission?: Yes   





(5) Multiple fractures of ribs of left side


Is this a current diagnosis for this admission?: Yes   





(6) Compression fracture of T12 vertebra


Is this a current diagnosis for this admission?: Yes   





(7) Pathologic fracture


Is this a current diagnosis for this admission?: Yes   





(8) Hypocalcemia


Is this a current diagnosis for this admission?: Yes   





(9) Metabolic acidosis


Is this a current diagnosis for this admission?: Yes   





(10) Subcapital fracture of right femur


Is this a current diagnosis for this admission?: Yes   





(11) Secondary hyperparathyroidism


Is this a current diagnosis for this admission?: Yes   





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


Is this a current diagnosis for this admission?: Yes   





(13) Clostridioides difficile diarrhea


Is this a current diagnosis for this admission?: Yes   





(14) Hypokalemia


Is this a current diagnosis for this admission?: Yes   





(15) Delirium


Is this a current diagnosis for this admission?: Yes   





(16) Hypotension


Is this a current diagnosis for this admission?: Yes   


Hospital Course:: 





Patient 89-year-old male he came to the emergency room on 2020 for 

evaluation of recurrent falls, in the emergency room he was evaluated, he was 

found to have bilateral pneumonia, right hip joint subcapital fracture with 

multiple rib fracture, electrolyte derangements, he was admitted to the hospital

for management.  He was screened for SARS-CoV-2, he was positive for infection 

he was treated with Remdesivir, dexamethasone 6 mg IV divided doses for 10 days,

he received the antiviral remdesivir for 5 days.  He also have staph aureus 

septicemia, methicillin sensitive, treated with IV antibiotic.  There was 

associated kidney injury with hypocalcemia associated with secondary 

hyperparathyroidism, hypokalemia due to transcellular shift.  He has urine 

monoclonal paraproteinemia suspicious for multiple myeloma, consult obtained 

from oncology plan was to do further evaluation before he .  Hospital course

was very eventful, he was seen by orthopedic because he sustained a right hip 

fracture he underwent open reduction internal fixation of the right hip joint 

last week.  Patient was making progress overall after he received the antiviral 

and the dexamethasone for his COVID he was supposed to be transferred to rehab 

next week, discharge planning was making arrangement for transfer.  He also has 

C. difficile colitis treated with p.o. vancomycin earlier this morning was 

noticed to have low blood pressure and was confused he was given boluses of 

normal saline to restore blood pressure, patient was a DNR status, he  

this morning, the cause of death is COVID related complications.

## 2020-09-12 NOTE — PDOC PROGRESS REPORT
Subjective


Progress Note for:: 09/12/20


Subjective:: 





Patient was seen in the COVID unit, is very confused, blood pressure was on the 

low side


Reason For Visit: 


RECURRENT FALLS,RIGHT FEMORAL FRACTURE,PNEUMONIA,








Physical Exam


Vital Signs: 


                                        











Temp Pulse Resp BP Pulse Ox


 


 97.9 F   86   21 H  87/58 L  91 L


 


 09/12/20 00:14  09/12/20 07:00  09/12/20 00:14  09/12/20 10:11  09/12/20 00:14








                                 Intake & Output











 09/11/20 09/12/20 09/13/20





 06:59 06:59 06:59


 


Intake Total 2774 1780 2


 


Output Total 550 990 


 


Balance 2224 790 2


 


Weight 86.7 kg 87.5 kg 














Results


Laboratory Results: 


                                        





                                 09/11/20 05:16 





                                 09/12/20 05:50 





                                        











  09/12/20





  05:50


 


Sodium  142.5


 


Potassium  3.5 L


 


Chloride  118 H


 


Carbon Dioxide  20 L


 


Anion Gap  5


 


BUN  26 H


 


Creatinine  1.40 H


 


Est GFR (African Amer)  58 L


 


Glucose  106


 


Calcium  6.1 L*


 


Total Bilirubin  0.4


 


AST  16 L


 


Alkaline Phosphatase  59


 


Total Protein  3.5 L


 


Albumin  1.6 L








                                        











  08/22/20 08/22/20 08/22/20





  11:40 11:40 12:38


 


Creatine Kinase  Cancelled   1513 H


 


CK-MB (CK-2)   


 


Troponin I   Cancelled 


 


NT-Pro-B Natriuret Pep   














  08/22/20 08/22/20 08/22/20





  12:38 12:38 16:55


 


Creatine Kinase   


 


CK-MB (CK-2)   


 


Troponin I  0.122   0.111


 


NT-Pro-B Natriuret Pep   4610 H 














  08/29/20 08/29/20





  02:31 02:31


 


Creatine Kinase  106 


 


CK-MB (CK-2)   2.04


 


Troponin I   0.013


 


NT-Pro-B Natriuret Pep  











Impressions: 


                                        





Cervical Spine CT  08/22/20 12:18


IMPRESSION:  No acute fracture or dislocation of the cervical spine.  Multilevel

spondylosis, degenerative disc disease, and facet arthropathy.


 








Head CT  08/22/20 12:18


IMPRESSION:  No acute intracranial hemorrhage, mass, or evidence of acute 

territorial infarct.


EVIDENCE OF ACUTE STROKE: NO.


 








Chest CT  08/22/20 12:21


IMPRESSION:


1. Although there is motion in the pelvis, there appears to be an acute impacted

subcapital fracture of the right proximal femur.  Further evaluation with 

dedicated two view radiograph is recommended.


2. Age-indeterminate moderate compression fracture at T12, however this is 

stable since 8/6/2020 radiograph.  This may be subacute or chronic.  No 

retropulsed fracture fragments.


3. Acute appearing fractures of the anterolateral left 8-10th ribs.


4. Patchy areas of consolidation with areas of nodular consolidation in both 

lungs, suggestive of infectious/ inflammatory process.  Given nodular 

components, a follow-up CT of the chest in 3 months is recommended to confirm 

complete resolution.  Commonly reported imaging features of COVID-19  pneumonia 

are present. Other processes such as influenza pneumonia and organizing 

pneumonia, as can be seen with drug toxicity and connective tissue disease, can 

cause a similar imaging pattern.


5. No evidence of acute traumatic solid organ or vascular injury in the chest, 

abdomen or pelvis.


 








Abdomen/Pelvis CT  08/22/20 12:23


IMPRESSION:


1. Although there is motion in the pelvis, there appears to be an acute impacted

subcapital fracture of the right proximal femur.  Further evaluation with 

dedicated two view radiograph is recommended.


2. Age-indeterminate moderate compression fracture at T12, however this is 

stable since 8/6/2020 radiograph.  This may be subacute or chronic.  No 

retropulsed fracture fragments.


3. Acute appearing fractures of the anterolateral left 8-10th ribs.


4. Patchy areas of consolidation with areas of nodular consolidation in both 

lungs, suggestive of infectious/ inflammatory process.  Given nodular component

s, a follow-up CT of the chest in 3 months is recommended to confirm complete 

resolution.  Commonly reported imaging features of COVID-19  pneumonia are 

present. Other processes such as influenza pneumonia and organizing pneumonia, 

as can be seen with drug toxicity and connective tissue disease, can cause a 

similar imaging pattern.


5. No evidence of acute traumatic solid organ or vascular injury in the chest, 

abdomen or pelvis.


 








Skeletal Survey  08/25/20 07:00


IMPRESSION:  1.  Acute/subacute impacted fracture of the right femoral neck.


2.  Findings as described.


 








Chest X-Ray  09/03/20 00:00


IMPRESSION:  Improving pneumonia.


 








Hip/Pelvis X-Ray  09/09/20 00:00


IMPRESSION:  Right hip hemiarthroplasty.  Refer to operative note for further 

information.


 














Assessment & Plan





- Diagnosis


(1) Sepsis


Qualifiers: 


   Sepsis type: methicillin susceptible Staphylococcus aureus   Sepsis acute 

organ dysfunction status: with acute organ dysfunction   Severe sepsis acute 

organ dysfunction type: acute renal failure   Acute renal failure type: with 

acute tubular necrosis   Severe sepsis shock status: without septic shock   Q

ualified Code(s): A41.01 - Sepsis due to Methicillin susceptible Staphylococcus 

aureus; R65.20 - Severe sepsis without septic shock; N17.0 - Acute kidney 

failure with tubular necrosis   


Is this a current diagnosis for this admission?: Yes   





(2) Pneumonia due to COVID-19 virus


Is this a current diagnosis for this admission?: Yes   





(3) Staphylococcus aureus septicemia


Is this a current diagnosis for this admission?: Yes   





(4) Acute kidney injury


Is this a current diagnosis for this admission?: Yes   





(5) Multiple fractures of ribs of left side


Qualifiers: 


   Encounter type: initial encounter   Fracture type: closed   Qualified 

Code(s): S22.42XA - Multiple fractures of ribs, left side, initial encounter for

closed fracture   


Is this a current diagnosis for this admission?: Yes   





(6) Compression fracture of T12 vertebra


Qualifiers: 


   Encounter type: initial encounter   Qualified Code(s): S22.080A - Wedge 

compression fracture of T11-T12 vertebra, initial encounter for closed fracture 

 


Is this a current diagnosis for this admission?: Yes   





(7) Pathologic fracture


Qualifiers: 


   Pathology associated with fracture: osteoporosis   Osteoporosis type: age-rel

ated   Site of pathological fracture: vertebra   Encounter type: initial 

encounter   Qualified Code(s): M80.08XA - Age-related osteoporosis with current 

pathological fracture, vertebra(e), initial encounter for fracture   


Is this a current diagnosis for this admission?: Yes   





(8) Hypocalcemia


Is this a current diagnosis for this admission?: Yes   





(9) Metabolic acidosis


Is this a current diagnosis for this admission?: Yes   





(10) Subcapital fracture of right femur


Qualifiers: 


   Encounter type: initial encounter   Fracture type: closed   Qualified 

Code(s): S72.011A - Unspecified intracapsular fracture of right femur, initial 

encounter for closed fracture   


Is this a current diagnosis for this admission?: Yes   





(11) Secondary hyperparathyroidism


Is this a current diagnosis for this admission?: Yes   





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


Is this a current diagnosis for this admission?: Yes   





(13) Clostridioides difficile diarrhea


Is this a current diagnosis for this admission?: Yes   





(14) Hypokalemia


Is this a current diagnosis for this admission?: Yes   





(15) Delirium


Is this a current diagnosis for this admission?: Yes   


Plan: 


Patient is confused today, etiology is multifactorial








(16) Hypotension


Qualifiers: 


   Hypotension type: other hypotension type   Qualified Code(s): I95.89 - Other 

hypotension   


Is this a current diagnosis for this admission?: Yes   


Plan: 


The blood pressure is low, give bolus,250  cc start maintenance IV fluids normal

saline at 100 cc/h








- Time


Time Spent with patient: 25-34 minutes


Level of Care: IMCU


Medications reviewed and adjusted accordingly: Yes


Anticipated discharge: SNF


Anticipated DC Timeframe: when bed available